# Patient Record
Sex: FEMALE | Race: WHITE | NOT HISPANIC OR LATINO | Employment: OTHER | URBAN - METROPOLITAN AREA
[De-identification: names, ages, dates, MRNs, and addresses within clinical notes are randomized per-mention and may not be internally consistent; named-entity substitution may affect disease eponyms.]

---

## 2017-01-04 ENCOUNTER — DOCUMENTATION ONLY (OUTPATIENT)
Dept: OTHER | Facility: CLINIC | Age: 82
End: 2017-01-04

## 2017-05-17 DIAGNOSIS — Z12.31 VISIT FOR SCREENING MAMMOGRAM: Primary | ICD-10-CM

## 2017-06-30 DIAGNOSIS — M1A.0710 IDIOPATHIC CHRONIC GOUT OF RIGHT ANKLE WITHOUT TOPHUS: ICD-10-CM

## 2017-06-30 RX ORDER — INDOMETHACIN 25 MG/1
25 CAPSULE ORAL
Qty: 60 CAPSULE | Refills: 1 | Status: SHIPPED | OUTPATIENT
Start: 2017-06-30 | End: 2017-11-15

## 2017-06-30 NOTE — TELEPHONE ENCOUNTER
Reason for call:  Medication    1. Medication Name?  Indomethazin  2. Is this request for a refill? Yes  3. What Pharmacy do you use?  [facility]  4. Have you contacted your pharmacy?  Yes  5. If yes, when?  Today  (Please note that the turn-around-time for prescriptions is 72 business hours; I am sending your request at this time. SEND TO  [facility]  )  Description:    The pharmacy told her her prescription was out of date. Please call Norma to advise  Was an appointment offered for this a call?   No  Preferred method for responding to this message  [phone]  If we cannot reach you directly, may we leave a detailed response at the number you provided?  Yes

## 2017-06-30 NOTE — TELEPHONE ENCOUNTER
Indocin  Last visit: 10.31.16  Last refill: 11.16.15 #60 /1 - has not filled since 2015. Please advise. Thank you  PCP Loree Foreman

## 2017-07-18 DIAGNOSIS — Z12.31 VISIT FOR SCREENING MAMMOGRAM: Primary | ICD-10-CM

## 2017-07-18 PROCEDURE — 77063 BREAST TOMOSYNTHESIS BI: CPT | Mod: TC

## 2017-07-18 PROCEDURE — G0202 SCR MAMMO BI INCL CAD: HCPCS | Mod: TC

## 2017-11-15 ENCOUNTER — APPOINTMENT (OUTPATIENT)
Dept: CT IMAGING | Facility: HOSPITAL | Age: 82
End: 2017-11-15
Attending: PHYSICIAN ASSISTANT
Payer: MEDICARE

## 2017-11-15 ENCOUNTER — APPOINTMENT (OUTPATIENT)
Dept: GENERAL RADIOLOGY | Facility: HOSPITAL | Age: 82
End: 2017-11-15
Attending: PHYSICIAN ASSISTANT
Payer: MEDICARE

## 2017-11-15 ENCOUNTER — HOSPITAL ENCOUNTER (EMERGENCY)
Facility: HOSPITAL | Age: 82
Discharge: HOME | End: 2017-11-15
Attending: PHYSICIAN ASSISTANT | Admitting: PHYSICIAN ASSISTANT
Payer: MEDICARE

## 2017-11-15 VITALS
DIASTOLIC BLOOD PRESSURE: 81 MMHG | HEIGHT: 63 IN | OXYGEN SATURATION: 98 % | BODY MASS INDEX: 32.78 KG/M2 | SYSTOLIC BLOOD PRESSURE: 186 MMHG | RESPIRATION RATE: 16 BRPM | TEMPERATURE: 98 F | WEIGHT: 185 LBS

## 2017-11-15 DIAGNOSIS — R07.9 CHEST PAIN OF UNKNOWN ETIOLOGY: ICD-10-CM

## 2017-11-15 LAB
ALBUMIN SERPL-MCNC: 3.7 G/DL (ref 3.4–5)
ALP SERPL-CCNC: 101 U/L (ref 40–150)
ALT SERPL W P-5'-P-CCNC: 17 U/L (ref 0–50)
ANION GAP SERPL CALCULATED.3IONS-SCNC: 8 MMOL/L (ref 3–14)
AST SERPL W P-5'-P-CCNC: 18 U/L (ref 0–45)
BASOPHILS # BLD AUTO: 0 10E9/L (ref 0–0.2)
BASOPHILS NFR BLD AUTO: 0.4 %
BILIRUB SERPL-MCNC: 0.4 MG/DL (ref 0.2–1.3)
BUN SERPL-MCNC: 20 MG/DL (ref 7–30)
CALCIUM SERPL-MCNC: 9.3 MG/DL (ref 8.5–10.1)
CHLORIDE SERPL-SCNC: 104 MMOL/L (ref 94–109)
CO2 SERPL-SCNC: 27 MMOL/L (ref 20–32)
CREAT SERPL-MCNC: 0.86 MG/DL (ref 0.52–1.04)
DIFFERENTIAL METHOD BLD: NORMAL
EOSINOPHIL # BLD AUTO: 0.2 10E9/L (ref 0–0.7)
EOSINOPHIL NFR BLD AUTO: 3 %
ERYTHROCYTE [DISTWIDTH] IN BLOOD BY AUTOMATED COUNT: 12.5 % (ref 10–15)
GFR SERPL CREATININE-BSD FRML MDRD: 62 ML/MIN/1.7M2
GLUCOSE SERPL-MCNC: 132 MG/DL (ref 70–99)
HCT VFR BLD AUTO: 38.6 % (ref 35–47)
HGB BLD-MCNC: 13.2 G/DL (ref 11.7–15.7)
IMM GRANULOCYTES # BLD: 0 10E9/L (ref 0–0.4)
IMM GRANULOCYTES NFR BLD: 0.3 %
LYMPHOCYTES # BLD AUTO: 2.1 10E9/L (ref 0.8–5.3)
LYMPHOCYTES NFR BLD AUTO: 29.6 %
MCH RBC QN AUTO: 31.1 PG (ref 26.5–33)
MCHC RBC AUTO-ENTMCNC: 34.2 G/DL (ref 31.5–36.5)
MCV RBC AUTO: 91 FL (ref 78–100)
MONOCYTES # BLD AUTO: 0.7 10E9/L (ref 0–1.3)
MONOCYTES NFR BLD AUTO: 9.6 %
NEUTROPHILS # BLD AUTO: 4.1 10E9/L (ref 1.6–8.3)
NEUTROPHILS NFR BLD AUTO: 57.1 %
NRBC # BLD AUTO: 0 10*3/UL
NRBC BLD AUTO-RTO: 0 /100
PLATELET # BLD AUTO: 190 10E9/L (ref 150–450)
POTASSIUM SERPL-SCNC: 4 MMOL/L (ref 3.4–5.3)
PROT SERPL-MCNC: 7.9 G/DL (ref 6.8–8.8)
RBC # BLD AUTO: 4.24 10E12/L (ref 3.8–5.2)
SODIUM SERPL-SCNC: 139 MMOL/L (ref 133–144)
TROPONIN I SERPL-MCNC: <0.015 UG/L (ref 0–0.04)
WBC # BLD AUTO: 7.1 10E9/L (ref 4–11)

## 2017-11-15 PROCEDURE — 93005 ELECTROCARDIOGRAM TRACING: CPT

## 2017-11-15 PROCEDURE — 71275 CT ANGIOGRAPHY CHEST: CPT | Mod: TC

## 2017-11-15 PROCEDURE — 25000132 ZZH RX MED GY IP 250 OP 250 PS 637: Mod: GY | Performed by: PHYSICIAN ASSISTANT

## 2017-11-15 PROCEDURE — 71020 XR CHEST 2 VW: CPT | Mod: TC

## 2017-11-15 PROCEDURE — 80053 COMPREHEN METABOLIC PANEL: CPT | Performed by: PHYSICIAN ASSISTANT

## 2017-11-15 PROCEDURE — 84484 ASSAY OF TROPONIN QUANT: CPT | Performed by: PHYSICIAN ASSISTANT

## 2017-11-15 PROCEDURE — 93010 ELECTROCARDIOGRAM REPORT: CPT | Performed by: INTERNAL MEDICINE

## 2017-11-15 PROCEDURE — 85025 COMPLETE CBC W/AUTO DIFF WBC: CPT | Performed by: PHYSICIAN ASSISTANT

## 2017-11-15 PROCEDURE — 99284 EMERGENCY DEPT VISIT MOD MDM: CPT | Performed by: PHYSICIAN ASSISTANT

## 2017-11-15 PROCEDURE — 99285 EMERGENCY DEPT VISIT HI MDM: CPT | Mod: 25

## 2017-11-15 PROCEDURE — A9270 NON-COVERED ITEM OR SERVICE: HCPCS | Mod: GY | Performed by: PHYSICIAN ASSISTANT

## 2017-11-15 PROCEDURE — 25000128 H RX IP 250 OP 636: Performed by: RADIOLOGY

## 2017-11-15 PROCEDURE — 36415 COLL VENOUS BLD VENIPUNCTURE: CPT | Performed by: PHYSICIAN ASSISTANT

## 2017-11-15 RX ORDER — ASPIRIN 81 MG/1
162 TABLET, CHEWABLE ORAL ONCE
Status: COMPLETED | OUTPATIENT
Start: 2017-11-15 | End: 2017-11-15

## 2017-11-15 RX ORDER — IOPAMIDOL 755 MG/ML
75 INJECTION, SOLUTION INTRAVASCULAR ONCE
Status: COMPLETED | OUTPATIENT
Start: 2017-11-15 | End: 2017-11-15

## 2017-11-15 RX ADMIN — ASPIRIN 81 MG 162 MG: 81 TABLET ORAL at 12:29

## 2017-11-15 RX ADMIN — IOPAMIDOL 75 ML: 755 INJECTION, SOLUTION INTRAVENOUS at 11:28

## 2017-11-15 ASSESSMENT — ENCOUNTER SYMPTOMS
NAUSEA: 0
ABDOMINAL PAIN: 0
PALPITATIONS: 0
DIZZINESS: 0
WEAKNESS: 0
FEVER: 0
VOMITING: 0
CHILLS: 0
SHORTNESS OF BREATH: 0
APPETITE CHANGE: 0
BRUISES/BLEEDS EASILY: 0
CHEST TIGHTNESS: 0
ACTIVITY CHANGE: 0
BACK PAIN: 1
HEADACHES: 0
NECK PAIN: 0
FATIGUE: 0
LIGHT-HEADEDNESS: 0

## 2017-11-15 NOTE — ED AVS SNAPSHOT
[facility]    [address] [address]    Phone:  [phone]                                       Norma Banerjee   MRN: [id number]    Department:  [facility]   Date of Visit:  11/15/2017           After Visit Summary Signature Page     I have received my discharge instructions, and my questions have been answered. I have discussed any challenges I see with this plan with the nurse or doctor.    ..........................................................................................................................................  Patient/Patient Representative Signature      ..........................................................................................................................................  Patient Representative Print Name and Relationship to Patient    ..................................................               ................................................  Date                                            Time    ..........................................................................................................................................  Reviewed by Signature/Title    ...................................................              ..............................................  Date                                                            Time

## 2017-11-15 NOTE — ED PROVIDER NOTES
"  History     Chief Complaint   Patient presents with     Chest Pain     last night at 0300 lasting 10-15 minutes     The history is provided by the patient.     Norma Banerjee is a 87 year old female who presented to the ED for evaluation of chest pain.  Her pain began last night at approx 0300.  Described as anterior and generalized.  \"Agravating.\"  Lasted approx 15 minutes.  No associated dyspnea. No nausea.  No diaphoresis.  No abdominal pain.  No cough. Pain free since approx 0315.  During her ROS, she does admit to changes in back pain.  She has chronic mild low back pain that over the last week has worsened and changed location to the mid and upper back with radiation to her arms.  No pain currently.     Problem List:    Patient Active Problem List    Diagnosis Date Noted     ACP (advance care planning) 10/31/2016     Priority: Medium     Advance Care Planning 11/14/2016: Receipt of ACP document:  Received: POLST which was signed and dated by provider on 11/3/16.  Document previously scanned on 11/8/16.  Order reviewed and found to be valid.  Code Status reflects choices in most recent ACP document.  Confirmed/documented designated decision maker(s).  Added by Melba Luu  Advance Care Planning 10/31/2016: ACP Review of Chart / Resources Provided:  Reviewed chart for advance care plan.  Norma Banerjee has no plan or code status on file. Discussed available resources and provided with information. Confirmed code status reflects current choices pending further ACP discussions.  Confirmed/documented legally designated decision makers.  Added by Mariana Richards               Incomplete tear of right rotator cuff 10/31/2016     Priority: Medium     Osteoarthritis 10/01/2012     Priority: Medium     Problem list name updated by automated process. Provider to review       Urinary incontinence 11/16/2006     Priority: Medium     Problem list name updated by automated process. Provider to review      "     Past Medical History:    Past Medical History:   Diagnosis Date     Carpal tunnel syndrome 02/14/2011     Chest pain, atypical 10/26/2007     Dermoid cyst of ovary 03/05/2008     Gout 09/26/2011     Osteoarthritis 10/01/2012     Recurrent UTI 09/26/2011     Urinary incontinence 11/16/2006     Weakness of both legs 09/26/2011       Past Surgical History:    Past Surgical History:   Procedure Laterality Date     ARTHROSCOPY KNEE      Osteoarthritis, left     C THALLIUM STRESS TEST  2007    negative persantine thallium GXT, Chest pain     CATARACT IOL, RT/LT      bilateral cataract surgery     CHOLECYSTECTOMY      Cholecystitis     COLONOSCOPY  4/2008    free of disease     HC ECP WITH CATARACT SURGERY       JOINT REPLACEMENT      Osteoarthritis, bilateral     RELEASE CARPAL TUNNEL  2011    RT     SURGICAL PATHOLOGY EXAM      ovarian mass, laparotomy       Family History:    Family History   Problem Relation Age of Onset     Alzheimer Disease Mother 92     cause of death     C.A.D. Father      CANCER Mother      uterine     CEREBROVASCULAR DISEASE Father 72     cva, cause of death     DIABETES Sister      Hypertension Sister      Hypertension Sister      Hypertension Sister        Social History:  Marital Status:  Single [1]  Social History   Substance Use Topics     Smoking status: Former Smoker     Types: Cigarettes     Smokeless tobacco: Never Used      Comment: tried to quit yes, passive exposure no     Alcohol use No        Medications:      aspirin 81 MG tablet   Acetaminophen (TYLENOL PO)   Elastic Bandages & Supports (ACE ARM SLING) MISC         Review of Systems   Constitutional: Negative for activity change, appetite change, chills, fatigue and fever.   Respiratory: Negative for chest tightness and shortness of breath.    Cardiovascular: Positive for chest pain. Negative for palpitations.        Resolved early this morning    Gastrointestinal: Negative for abdominal pain, nausea and vomiting.  "  Genitourinary: Negative.    Musculoskeletal: Positive for back pain. Negative for neck pain.   Skin: Negative.    Neurological: Negative for dizziness, weakness, light-headedness and headaches.   Hematological: Does not bruise/bleed easily.       Physical Exam   BP: 179/75  Heart Rate: 86  Temp: 97.8  F (36.6  C)  Resp: 16  Height: 160 cm (5' 3\")  Weight: 83.9 kg (185 lb)  SpO2: 97 %      Physical Exam   Constitutional: She is oriented to person, place, and time. She appears well-developed and well-nourished. No distress.   Pleasant and talkative    HENT:   Head: Normocephalic and atraumatic.   Eyes: Conjunctivae and EOM are normal.   Neck: Normal range of motion. Neck supple.   Cardiovascular: Normal rate and regular rhythm.    Pulmonary/Chest: Effort normal and breath sounds normal.   Abdominal: Soft. There is no tenderness. There is no guarding.   Musculoskeletal: She exhibits no edema.   Neurological: She is alert and oriented to person, place, and time.   Skin: Skin is warm and dry.   Psychiatric: She has a normal mood and affect.   Nursing note and vitals reviewed.      ED Course     ED Course     Procedures          EKG shows a sinus rhythm with PAC.  No evidence of ST or T wave concerns.      CXR shows no focal infiltrate, PTX, or widened mediastinum.     Medications   iopamidol (ISOVUE-370) solution 75 mL (75 mLs Intravenous Given 11/15/17 1128)   sodium chloride (PF) 0.9% PF flush 100 mL (100 mLs Intravenous Given 11/15/17 1128)   aspirin chewable tablet 162 mg (162 mg Oral Given 11/15/17 1229)     Results for orders placed or performed during the hospital encounter of 11/15/17   XR Chest 2 Views    Narrative    XR CHEST 2 VW    HISTORY: 87 years Female chest pain;     COMPARISON: None    TECHNIQUE: 2 views of the chest were obtained.    FINDINGS: Two views of the chest were obtained. Heart size and  pulmonary vascularity are within normal limits, lungs are clear both  views. No consolidating air space " opacities are present.    There are 2 or 3 small dense nodular opacities in the mid right lung  seen only on the PA projection.      Impression    IMPRESSION: Small dense nodular opacities mid right lung, suspect  calcified granulomas. Lungs otherwise clear.    JAGRUTI BANKS MD   CT Chest Angio w and w/o contrast    Narrative    CTA ANGIOGRAM CHEST CONTRAST  11/15/2017 11:36 AM    CLINICAL HISTORY: Female, age 87 years,  Dissection; ;    Comparison:  None    TECHNIQUE:  CT was performed of the chest  with IV contrast.   Sagittal, coronal and axial reconstructions were reviewed.     FINDINGS:  Chest CT:   Lungs : Minimal dependent atelectasis.  Thyroid: Asymmetric enlargement of the right lobe.  Heart and Great Vessels:  Good quality examination demonstrates no  evidence of aortic dissection. There is small volume of calcified and  noncalcified plaque seen throughout the thoracic aorta.    Lymph Nodes:  Normal.  Pleura: Calcified and noncalcified pleural plaques posteriorly within  the right hemithorax.  Bony Structures:  Mild degenerative changes of the thoracic spine.  Esophagus/duodenum: Mild circumferential wall thickening in its distal  aspect.    Visualized portions of the abdomen:  Liver:  The visualized portions are normal.  Gallbladder: Surgically absent.  Spleen: The visualized portions are normal.  Pancreas: The visualized portions are normal.  Adrenal Glands: Normal  Kidneys: 3 cm cortical cyst at the upper pole the left kidney.  Cortical scarring in the right kidney and small cortical cysts.  Ureters: Not included.  Abdominal Aorta: The visualized portions are normal.    Other Findings:  No lymphadenopathy or ascites in the upper abdomen.      Impression    IMPRESSION:  1.   Good quality examination demonstrates no evidence of dissection.    2.  Mild circumferential wall thickening of the distal esophagus,  possibly related to an esophagitis.    3.  Cortical scarring the right kidney and bilateral  cortical cysts of  the kidneys.    4.  Calcified pleural plaques of the right hemithorax.    MATT KAUFMAN MD   CBC with platelets differential   Result Value Ref [facility]    WBC 7.1 4.0 - 11.0 10e9/L    RBC Count 4.24 3.8 - 5.2 10e12/L    Hemoglobin 13.2 11.7 - 15.7 g/dL    Hematocrit 38.6 35.0 - 47.0 %    MCV 91 78 - 100 fl    MCH 31.1 26.5 - 33.0 pg    MCHC 34.2 31.5 - 36.5 g/dL    RDW 12.5 10.0 - 15.0 %    Platelet Count 190 150 - 450 10e9/L    Diff Method Automated Method     % Neutrophils 57.1 %    % Lymphocytes 29.6 %    % Monocytes 9.6 %    % Eosinophils 3.0 %    % Basophils 0.4 %    % Immature Granulocytes 0.3 %    Nucleated RBCs 0 0 /100    Absolute Neutrophil 4.1 1.6 - 8.3 10e9/L    Absolute Lymphocytes 2.1 0.8 - 5.3 10e9/L    Absolute Monocytes 0.7 0.0 - 1.3 10e9/L    Absolute Eosinophils 0.2 0.0 - 0.7 10e9/L    Absolute Basophils 0.0 0.0 - 0.2 10e9/L    Abs Immature Granulocytes 0.0 0 - 0.4 10e9/L    Absolute Nucleated RBC 0.0    Comprehensive metabolic panel   Result Value Ref [facility]    Sodium 139 133 - 144 mmol/L    Potassium 4.0 3.4 - 5.3 mmol/L    Chloride 104 94 - 109 mmol/L    Carbon Dioxide 27 20 - 32 mmol/L    Anion Gap 8 3 - 14 mmol/L    Glucose 132 (H) 70 - 99 mg/dL    Urea Nitrogen 20 7 - 30 mg/dL    Creatinine 0.86 0.52 - 1.04 mg/dL    GFR Estimate 62 >60 mL/min/1.7m2    GFR Estimate If Black 75 >60 mL/min/1.7m2    Calcium 9.3 8.5 - 10.1 mg/dL    Bilirubin Total 0.4 0.2 - 1.3 mg/dL    Albumin 3.7 3.4 - 5.0 g/dL    Protein Total 7.9 6.8 - 8.8 g/dL    Alkaline Phosphatase 101 40 - 150 U/L    ALT 17 0 - 50 U/L    AST 18 0 - 45 U/L   Troponin I   Result Value Ref [facility]    Troponin I ES <0.015 0.000 - 0.045 ug/L       Critical Care time:  none               Labs Ordered and Resulted from Time of ED Arrival Up to the Time of Departure from the ED   COMPREHENSIVE METABOLIC PANEL - Abnormal; Notable for the following:        Result Value    Glucose 132 (*)     All other components within normal  "limits   CBC WITH PLATELETS DIFFERENTIAL   TROPONIN I   PERIPHERAL IV CATHETER       Assessments & Plan (with Medical Decision Making)   Pain had entirely resolved 6 hours prior to presenting to the ED.  No associated symptoms of ACS.  I did have a concern about a possible aortic issue with her new onset upper back pain and arm radiculopathy, as well as the new chest pain.  This was negative.  Treated with ASA and advised starting a baby aspirin daily.  Needs close follow-up with PCP.  I stressed the utmost importance of returning HERE for ANY return of pain, pain that changes in quality, location, or character, dyspnea, diaphoresis, or ANY other questions or concerns.  Ms. Banerjee tells me \"I haven't felt this good in weeks.\"  She is quite happy and agreeable.  Discharged in stable and good condition, ambulatory without assistance, completely asymptomatic.       I have reviewed the nursing notes.    I have reviewed the findings, diagnosis, plan and need for follow up with the patient.       Discharge Medication List as of 11/15/2017 12:18 PM      START taking these medications    Details   aspirin 81 MG tablet Take 1 tablet (81 mg) by mouth daily, R-OTC, OTC             Final diagnoses:   Chest pain of unknown etiology       11/15/2017   [facility]     Darvin Cortez PA-C  11/15/17 1326    "

## 2017-11-15 NOTE — DISCHARGE INSTRUCTIONS
*CHEST PAIN, UNCERTAIN CAUSE    Based on your exam today, the exact cause of your chest pain is not certain. Your condition does not seem serious at this time, and your pain does not appear to be coming from your heart. However, sometimes the signs of a serious problem take more time to appear. Therefore, watch for the warning signs listed below.  HOME CARE:  1. Rest today and avoid strenuous activity.  2. Take any prescribed medicine as directed.  FOLLOW UP with your doctor in 1-3 days.   GET PROMPT MEDICAL ATTENTION if any of the following occur:    A change in the type of pain: if it feels different, becomes more severe, lasts longer, or begins to spread into your shoulder, arm, neck, jaw or back    Shortness of breath or increased pain with breathing    Weakness, dizziness, or fainting    Cough with blood or dark colored sputum (phlegm)    Fever over 101  F (38.3  C)    Swelling, pain or redness in one leg    9888-9637 [employer]. [address] [place] [address]. All rights reserved. This information is not intended as a substitute for professional medical care. Always follow your healthcare professional's instructions.  This information has been modified by your health care provider with permission from the publisher.  What to expect when you have contrast    During your exam, we will inject  contrast  into your vein or artery. (Contrast is a clear liquid with iodine in it. It shows up on X-rays.)    You may feel warm or hot. You may have a metal taste in your mouth and a slight upset stomach. You may also feel pressure near the kidneys and bladder. These effects will last about 1 to 3 minutes.    Please tell us if you have:    Sneezing     Itching    Hives     Swelling in the face    A hoarse voice    Breathing problems    Other new symptoms    Serious problems are rare.  They may include:    Irregular heartbeat     Seizures    Kidney failure              Tissue damage    Shock       Death    If you have any problems during the exam, we  will treat them right away.    When you get home    Call your hospital if you have any new symptoms in the next 2 days, like hives or swelling. (Phone numbers are at the bottom of this page.) Or call your family doctor.     If you have wheezing or trouble breathing, call 911.    Self-care  -Drink at least 4 extra glasses of water today.   This reduces the stress on your kidneys.  -Keep taking your regular medicines.    The contrast will pass out of your body in your  Urine(pee). This will happen in the next 24 hours. You  will not feel this. Your urine will not  change color.    If you have kidney problems or take metformin    Drink 4 to 8 large glasses of water for the next  2 days, if you are not on a fluid restriction.    ?If you take metformin (Glucophage or Glucovance) for diabetes, keep taking it.      ?Your kidney function tests are abnormal.  If you take Metformin, do not take it for 48 hours. Please go to your clinic for a blood test within 3 days after your exam before the restarting this medicine.     (Note to provider:please give patient prescription for lab tests.)    ?Special instructions: -    I have read and understand the above information.    Patient Sign Here:______________________________________Date:________Time:______    Staff Sign Here:________________________________________Date:_______Time:______      Radiology Departments:     ?[facility]: [phone] ?[facility]: [phone]     ?[place]: [phone] ?[facility]:[phone]      ?[facility]: [phone]  ?[facility]: [phone]  ?[facility]:[phone]    ?[facility]:[phone]  ?[facility]:[phone]    CT scan was negative for aortic concerns.      I do not know what caused your chest pain.  Please start a daily aspirin and follow-up with Dr. Foreman.      Please return HERE for ANY return of chest pain, any shortness of breath, vomiting, sweating, or ANY other  concerns.

## 2017-11-15 NOTE — ED NOTES
DC instructions reviewed with patient.  Patient verbalized understanding and has no further questions.  Home to rest.  Will follow up with Dr. TESS Foreman or will return to the emergency room if chest pain returns or any other concerns.

## 2017-11-15 NOTE — ED AVS SNAPSHOT
[facility]    [address] [address]    Phone:  [phone]                                       Norma Banerjee   MRN: [id number]    Department:  [facility]   Date of Visit:  11/15/2017           Patient Information     Date Of Birth          1/1/1930        Your diagnoses for this visit were:     Chest pain of unknown etiology        You were seen by Darvin Cortez PA-C.      Follow-up Information     Schedule an appointment as soon as possible for a visit with Carol Foreman MD.    Specialty:  Family Practice    Contact information:    [facility] [place]  [address]  [place] [address] [phone]          Follow up with [facility].    Specialty:  EMERGENCY MEDICINE    Why:  If symptoms worsen    Contact information:    [address] [address] [phone]    Additional information:    From [facility]: Take [address]. Turn left at [address]. Turn left at the first stoplight on [address]. At the first stop sign, take a right onto [address]. Take a left into the parking lot and continue through until you reach the North enterance of the building.       From [place]: Take [address]. Take the [address] ramp towards [place]. Turn left onto [address]. Take a slight right onto [address]. Turn left at the first stoplight on [address]. At the first stop sign, take a right onto [address]. Take a left into the parking lot and continue through until you reach the North enterance of the building.       From [place]: Take [address]. Take a right at [address]. At the first stop sign, take a right onto [address]. Take a left into the parking lot and continue through until you reach the North enterance of the building.         Discharge Instructions            *CHEST PAIN, UNCERTAIN CAUSE    Based on your exam today, the exact  cause of your chest pain is not certain. Your condition does not seem serious at this time, and your pain does not appear to be coming from your heart. However, sometimes the signs of a serious problem take more time to appear. Therefore, watch for the warning signs listed below.  HOME CARE:  1. Rest today and avoid strenuous activity.  2. Take any prescribed medicine as directed.  FOLLOW UP with your doctor in 1-3 days.   GET PROMPT MEDICAL ATTENTION if any of the following occur:    A change in the type of pain: if it feels different, becomes more severe, lasts longer, or begins to spread into your shoulder, arm, neck, jaw or back    Shortness of breath or increased pain with breathing    Weakness, dizziness, or fainting    Cough with blood or dark colored sputum (phlegm)    Fever over 101  F (38.3  C)    Swelling, pain or redness in one leg    3104-0204 [employer]. [address] [place] [address]. All rights reserved. This information is not intended as a substitute for professional medical care. Always follow your healthcare professional's instructions.  This information has been modified by your health care provider with permission from the publisher.  What to expect when you have contrast    During your exam, we will inject  contrast  into your vein or artery. (Contrast is a clear liquid with iodine in it. It shows up on X-rays.)    You may feel warm or hot. You may have a metal taste in your mouth and a slight upset stomach. You may also feel pressure near the kidneys and bladder. These effects will last about 1 to 3 minutes.    Please tell us if you have:    Sneezing     Itching    Hives     Swelling in the face    A hoarse voice    Breathing problems    Other new symptoms    Serious problems are rare.  They may include:    Irregular heartbeat     Seizures    Kidney failure              Tissue damage    Shock      Death    If you have any problems during the exam, we  will treat them  right away.    When you get home    Call your hospital if you have any new symptoms in the next 2 days, like hives or swelling. (Phone numbers are at the bottom of this page.) Or call your family doctor.     If you have wheezing or trouble breathing, call 911.    Self-care  -Drink at least 4 extra glasses of water today.   This reduces the stress on your kidneys.  -Keep taking your regular medicines.    The contrast will pass out of your body in your  Urine(pee). This will happen in the next 24 hours. You  will not feel this. Your urine will not  change color.    If you have kidney problems or take metformin    Drink 4 to 8 large glasses of water for the next  2 days, if you are not on a fluid restriction.    ?If you take metformin (Glucophage or Glucovance) for diabetes, keep taking it.      ?Your kidney function tests are abnormal.  If you take Metformin, do not take it for 48 hours. Please go to your clinic for a blood test within 3 days after your exam before the restarting this medicine.     (Note to provider:please give patient prescription for lab tests.)    ?Special instructions: -    I have read and understand the above information.    Patient Sign Here:______________________________________Date:________Time:______    Staff Sign Here:________________________________________Date:_______Time:______      Radiology Departments:     ?[facility]: [phone] ?[place]: [phone]     ?[place]: [phone] ?[facility]:[phone]      ?[place]: [phone]  ?[facility]: [phone]  ?[facility]:[phone]    ?[facility]:[phone]  ?[facility]:[phone]    CT scan was negative for aortic concerns.      I do not know what caused your chest pain.  Please start a daily aspirin and follow-up with Dr. Foreman.      Please return HERE for ANY return of chest pain, any shortness of breath, vomiting, sweating, or ANY other concerns.     Future Appointments        Provider Department Dept Phone Center     11/30/2017 9:00 AM Carol Foreman MD [facility] [phone] [facility]         Review of your medicines      START taking        Dose / Directions Last dose taken    aspirin 81 MG tablet   Dose:  81 mg        Take 1 tablet (81 mg) by mouth daily   Refills:  OTC          Our records show that you are taking the medicines listed below. If these are incorrect, please call your family doctor or clinic.        Dose / Directions Last dose taken    ACE ARM SLING Misc   Dose:  1 Units   Quantity:  1 each        1 Units continuous   Refills:  0        TYLENOL PO        Take by mouth At Bedtime   Refills:  0                Prescriptions were sent or printed at these locations (1 Prescription)                   [id number] - [place], [place] - [address]   [address] [[email or web address]][place] [address]    Telephone:  [phone]   Fax:  [phone]   Hours:                  Not Printed or Sent (1 of 1)         aspirin 81 MG tablet                Procedures and tests performed during your visit     CBC with platelets differential    CT Chest Angio w and w/o contrast    Comprehensive metabolic panel    EKG 12 lead    EKG 12-lead, tracing only    Peripheral IV: Standard    Troponin I    XR Chest 2 Views      Orders Needing Specimen Collection     None      Pending Results     No orders found from 11/13/2017 to 11/16/2017.            Pending Culture Results     No orders found from 11/13/2017 to 11/16/2017.            Thank you for choosing [facility]       Thank you for choosing [facility] for your care. Our goal is always to provide you with excellent care. Hearing back from our patients is one way we can continue to improve our services. Please take a few minutes to complete the written survey that you may receive in the mail after you visit with us. Thank you!        [employer] Information     [employer] gives you secure access to your electronic health record. If you see a primary care provider, you can also send  messages to your care team and make appointments. If you have questions, please call your primary care clinic.  If you do not have a primary care provider, please call [phone] and they will assist you.        Care EveryWhere ID     This is your Care EveryWhere ID. This could be used by other organizations to access your [facility] medical records  [id number]        Equal Access to Services     YOUNG JAMIL : Jake Saleh, mars thomas, jacque bowman, nii veronica . So [facility] [phone].    ATENCIÓN: Si habla español, tiene a oliva disposición servicios gratuitos de asistencia lingüística. Fransisco al [phone].    We comply with applicable federal civil rights laws and [place] laws. We do not discriminate on the basis of race, color, national origin, age, disability, sex, sexual orientation, or gender identity.            After Visit Summary       This is your record. Keep this with you and show to your community pharmacist(s) and doctor(s) at your next visit.

## 2017-11-15 NOTE — ED NOTES
"Ambulated into ED room 4 independently with c/o chest pain rated 5/10 during the night. Denies any cardiac history. Denies any pain currently. Denies SOB. Did state pain radiated into left arm and back at the time it occurred. Patient cooperative but upset with full work up and states, \"I'm just fine, I've never had a health issue, I need to get home to my soup.\" Cardiac monitor placed which shows a SR in the 70's with frequent PVC's. Call light within reach.   "

## 2017-11-30 ENCOUNTER — RADIANT APPOINTMENT (OUTPATIENT)
Dept: GENERAL RADIOLOGY | Facility: OTHER | Age: 82
End: 2017-11-30
Attending: FAMILY MEDICINE
Payer: MEDICARE

## 2017-11-30 ENCOUNTER — OFFICE VISIT (OUTPATIENT)
Dept: FAMILY MEDICINE | Facility: OTHER | Age: 82
End: 2017-11-30
Attending: FAMILY MEDICINE
Payer: COMMERCIAL

## 2017-11-30 VITALS
OXYGEN SATURATION: 97 % | BODY MASS INDEX: 33.13 KG/M2 | DIASTOLIC BLOOD PRESSURE: 80 MMHG | HEIGHT: 63 IN | RESPIRATION RATE: 20 BRPM | HEART RATE: 57 BPM | SYSTOLIC BLOOD PRESSURE: 124 MMHG | WEIGHT: 187 LBS

## 2017-11-30 DIAGNOSIS — M54.16 LUMBAR RADICULOPATHY: ICD-10-CM

## 2017-11-30 DIAGNOSIS — M25.552 HIP PAIN, LEFT: ICD-10-CM

## 2017-11-30 DIAGNOSIS — R23.0 TOE CYANOSIS: ICD-10-CM

## 2017-11-30 DIAGNOSIS — Z00.00 ROUTINE GENERAL MEDICAL EXAMINATION AT A HEALTH CARE FACILITY: Primary | ICD-10-CM

## 2017-11-30 DIAGNOSIS — Z23 NEED FOR PROPHYLACTIC VACCINATION AND INOCULATION AGAINST INFLUENZA: ICD-10-CM

## 2017-11-30 DIAGNOSIS — R07.89 OTHER CHEST PAIN: ICD-10-CM

## 2017-11-30 PROCEDURE — 99397 PER PM REEVAL EST PAT 65+ YR: CPT | Performed by: FAMILY MEDICINE

## 2017-11-30 PROCEDURE — 90662 IIV NO PRSV INCREASED AG IM: CPT | Performed by: FAMILY MEDICINE

## 2017-11-30 PROCEDURE — 73502 X-RAY EXAM HIP UNI 2-3 VIEWS: CPT | Mod: TC

## 2017-11-30 PROCEDURE — 72100 X-RAY EXAM L-S SPINE 2/3 VWS: CPT | Mod: TC

## 2017-11-30 PROCEDURE — G0008 ADMIN INFLUENZA VIRUS VAC: HCPCS | Performed by: FAMILY MEDICINE

## 2017-11-30 PROCEDURE — 99212 OFFICE O/P EST SF 10 MIN: CPT | Mod: 25 | Performed by: FAMILY MEDICINE

## 2017-11-30 PROCEDURE — 99212 OFFICE O/P EST SF 10 MIN: CPT | Mod: 25

## 2017-11-30 ASSESSMENT — PAIN SCALES - GENERAL: PAINLEVEL: NO PAIN (0)

## 2017-11-30 NOTE — MR AVS SNAPSHOT
After Visit Summary   11/30/2017    Norma Banerjee    MRN: [id number]           Patient Information     Date Of Birth          1/1/1930        Visit Information        Provider Department      11/30/2017 9:00 AM Carol Foreman MD [facility] [place]        Today's Diagnoses     Routine general medical examination at a health care facility    -  1    Need for prophylactic vaccination and inoculation against influenza        Hip pain, left        Lumbar radiculopathy          Care Instructions      Preventive Health Recommendations  Female Ages 65 +    Yearly exam:     See your health care provider every year in order to  o Review health changes.   o Discuss preventive care.    o Review your medicines if your doctor has prescribed any.      You no longer need a yearly Pap test unless you've had an abnormal Pap test in the past 10 years. If you have vaginal symptoms, such as bleeding or discharge, be sure to talk with your provider about a Pap test.      Every 1 to 2 years, have a mammogram.  If you are over 69, talk with your health care provider about whether or not you want to continue having screening mammograms.      Every 10 years, have a colonoscopy. Or, have a yearly FIT test (stool test). These exams will check for colon cancer.       Have a cholesterol test every 5 years, or more often if your doctor advises it.       Have a diabetes test (fasting glucose) every three years. If you are at risk for diabetes, you should have this test more often.       At age 65, have a bone density scan (DEXA) to check for osteoporosis (brittle bone disease).    Shots:    Get a flu shot each year.    Get a tetanus shot every 10 years.    Talk to your doctor about your pneumonia vaccines. There are now two you should receive - Pneumovax (PPSV 23) and Prevnar (PCV 13).    Talk to your doctor about the shingles vaccine.    Talk to your doctor about the hepatitis B vaccine.    Nutrition:     Eat at least 5  servings of fruits and vegetables each day.      Eat whole-grain bread, whole-wheat pasta and brown rice instead of white grains and rice.      Talk to your provider about Calcium and Vitamin D.     Lifestyle    Exercise at least 150 minutes a week (30 minutes a day, 5 days a week). This will help you control your weight and prevent disease.      Limit alcohol to one drink per day.      No smoking.       Wear sunscreen to prevent skin cancer.       See your dentist twice a year for an exam and cleaning.      See your eye doctor every 1 to 2 years to screen for conditions such as glaucoma, macular degeneration, cataracts, etc                   Follow-ups after your visit        Follow-up notes from your care team     Return in about 1 year (around 11/30/2018) for Physical Exam.      Future tests that were ordered for you today     Open Future Orders        Priority Expected Expires Ordered    XR LUMBAR SPINE 2/3 VIEWS (Clinic Performed) Routine 11/30/2017 11/30/2018 11/30/2017    XR HIP LT G/E 2 VW (Clinic Performed) Routine 11/30/2017 11/30/2018 11/30/2017            Who to contact     If you have questions or need follow up information about today's clinic visit or your schedule please contact [facility] directly at [phone].  Normal or non-critical lab and imaging results will be communicated to you by [employer]art, letter or phone within 4 business days after the clinic has received the results. If you do not hear from us within 7 days, please contact the clinic through [employer]art or phone. If you have a critical or abnormal lab result, we will notify you by phone as soon as possible.  Submit refill requests through VTM or call your pharmacy and they will forward the refill request to us. Please allow 3 business days for your refill to be completed.          Additional Information About Your Visit        VTM Information     VTM gives you secure access to your electronic health record. If you  "see a primary care provider, you can also send messages to your care team and make appointments. If you have questions, please call your primary care clinic.  If you do not have a primary care provider, please call [phone] and they will assist you.        Care EveryWhere ID     This is your Care EveryWhere ID. This could be used by other organizations to access your [place] medical records  [id number]        Your Vitals Were     Pulse Respirations Height Pulse Oximetry Breastfeeding? BMI (Body Mass Index)    57 20 5' 3\" (1.6 m) 97% No 33.13 kg/m2       Blood Pressure from Last 3 Encounters:   11/30/17 124/80   11/15/17 (!) 186/81   10/31/16 118/82    Weight from Last 3 Encounters:   11/30/17 187 lb (84.8 kg)   11/15/17 185 lb (83.9 kg)   10/31/16 180 lb (81.6 kg)              We Performed the Following     HC FLU VACCINE, INCREASED ANTIGEN, PRESV FREE     Vaccine Administration, Initial [[id number]]        Primary Care Provider Office Phone # Fax #    Carol Foreman [phone]       [facility] [place] [address]  [place] [address]        Equal Access to Services     YOUNG JAMIL AH: Hadii aad ku hadasho Soomaali, waaxda luqadaha, qaybta kaalmada adeegyada, waxay idiin hayaan ramon block la'yudith . So [facility] [phone].    ATENCIÓN: Si habla español, tiene a oliva disposición servicios gratuitos de asistencia lingüística. Llame al [phone].    We comply with applicable federal civil rights laws and [place] laws. We do not discriminate on the basis of race, color, national origin, age, disability, sex, sexual orientation, or gender identity.            Thank you!     Thank you for choosing [facility] [place]  for your care. Our goal is always to provide you with excellent care. Hearing back from our patients is one way we can continue to improve our services. Please take a few minutes to complete the written survey that you may receive in the mail after your visit with us. Thank " you!             Your Updated Medication List - Protect others around you: Learn how to safely use, store and throw away your medicines at www.disposemymeds.org.          This list is accurate as of: 11/30/17  9:37 AM.  Always use your most recent med list.                   Brand Name Dispense Instructions for use Diagnosis    aspirin 81 MG tablet      Take 1 tablet (81 mg) by mouth daily        TYLENOL PO      Take by mouth At Bedtime

## 2017-11-30 NOTE — NURSING NOTE
"Chief Complaint   Patient presents with     Physical     Musculoskeletal Problem     right hip pain radiates down her leg       Initial /80  Pulse 57  Resp 20  Ht 5' 3\" (1.6 m)  Wt 187 lb (84.8 kg)  SpO2 97%  Breastfeeding? No  BMI 33.13 kg/m2 Estimated body mass index is 33.13 kg/(m^2) as calculated from the following:    Height as of this encounter: 5' 3\" (1.6 m).    Weight as of this encounter: 187 lb (84.8 kg).  Medication Reconciliation: complete   Erendira Sol      "

## 2017-11-30 NOTE — PROGRESS NOTES
SUBJECTIVE:   Norma Banerjee is a 87 year old female who presents for Preventive Visit.      Are you in the first 12 months of your Medicare Part B coverage?  No    Healthy Habits:    Do you get at least three servings of calcium containing foods daily (dairy, green leafy vegetables, etc.)? No    Amount of exercise or daily activities, outside of work: not really    Problems taking medications regularly No    Medication side effects: No    Have you had an eye exam in the past two years? yes    Do you see a dentist twice per year? no    Do you have sleep apnea, excessive snoring or daytime drowsiness?no              Chief Complaint   Patient presents with     Physical     mammogram done in July, 2017, negative. She would prefer not doing any further mammograms now as she is almost 88     Musculoskeletal Problem     left hip pain radiates down her leg, sometimes coming from the lumbar spine. This is a sharp pain, starting with walking but gets better as she walks     Flu Shot     Chest Pain     she was seen in the ER 11-17-17 for chest pain that awakened her in the middle of the night radiating to her back and arms that lasted about an hour and resolved on its own. She went into the ER about 6 hours later and had a negative workup including a chest CT negative for AAA. She hasn't had any symptoms since and is able to walk the halls without pain.      Toe discolored     left great toe is discolored at times, no pain         Reviewed and updated as needed this visit by clinical staffTobacco  Allergies  Meds  Med Hx  Surg Hx  Fam Hx  Soc Hx        Reviewed and updated as needed this visit by Provider        Social History   Substance Use Topics     Smoking status: Former Smoker     Types: Cigarettes     Smokeless tobacco: Never Used      Comment: tried to quit yes, passive exposure no     Alcohol use No       The patient does not drink >3 drinks per day nor >7 drinks per week.     Today's PHQ-2 Score:   PHQ-2  ( 1999 Pf[employer]) 11/13/2014 11/11/2013   Q1: Little interest or pleasure in doing things 0 0   Q2: Feeling down, depressed or hopeless 0 0   PHQ-2 Score 0 0         Do you feel safe in your environment - Yes    Do you have a Health Care Directive?: Yes: Advance Directive has been received and scanned.    Current providers sharing in care for this patient include: Patient Care Team:  Carol Foreman MD as PCP - General (Family Practice)      Hearing impairment: No    Ability to successfully perform activities of daily living: Yes, no assistance needed     Fall risk:  Fallen 2 or more times in the past year?: No  Any fall with injury in the past year?: No      Home safety:  none identified      The following health maintenance items are reviewed in Epic and correct as of today:Health Maintenance   Topic Date Due     GALILEO QUESTIONNAIRE 1 YEAR  01/01/1948     INFLUENZA VACCINE (SYSTEM ASSIGNED)  09/01/2017     PHQ-9 Q1YR  10/31/2017     FALL RISK ASSESSMENT  07/18/2018     MAMMO Q1 YR  07/18/2018     ADVANCE DIRECTIVE PLANNING Q5 YRS  10/31/2021     TETANUS IMMUNIZATION (SYSTEM ASSIGNED)  11/11/2023     PNEUMOCOCCAL  Addressed     BP Readings from Last 3 Encounters:   11/30/17 124/80   11/15/17 (!) 186/81   10/31/16 118/82    Wt Readings from Last 3 Encounters:   11/30/17 187 lb (84.8 kg)   11/15/17 185 lb (83.9 kg)   10/31/16 180 lb (81.6 kg)                  Patient Active Problem List   Diagnosis     Urinary incontinence     Osteoarthritis     ACP (advance care planning)     Incomplete tear of right rotator cuff     Past Surgical History:   Procedure Laterality Date     ARTHROSCOPY KNEE      Osteoarthritis, left     C THALLIUM STRESS TEST  2007    negative persantine thallium GXT, Chest pain     CATARACT IOL, RT/LT      bilateral cataract surgery     CHOLECYSTECTOMY      Cholecystitis     COLONOSCOPY  4/2008    free of disease     HC ECP WITH CATARACT SURGERY       JOINT REPLACEMENT      Osteoarthritis, bilateral      RELEASE CARPAL TUNNEL  2011    RT     SURGICAL PATHOLOGY EXAM      ovarian mass, laparotomy       Social History   Substance Use Topics     Smoking status: Former Smoker     Types: Cigarettes     Smokeless tobacco: Never Used      Comment: tried to quit yes, passive exposure no     Alcohol use No     Family History   Problem Relation Age of Onset     Alzheimer Disease Mother 92     cause of death     C.A.D. Father      CANCER Mother      uterine     CEREBROVASCULAR DISEASE Father 72     cva, cause of death     DIABETES Sister      Hypertension Sister      Hypertension Sister      Hypertension Sister          Current Outpatient Prescriptions   Medication Sig Dispense Refill     aspirin 81 MG tablet Take 1 tablet (81 mg) by mouth daily  OTC     Acetaminophen (TYLENOL PO) Take by mouth At Bedtime       Allergies   Allergen Reactions     Alendronate Sodium      Hot tolerance  Fosamax       Nitrofurantoin      Chill  Fever  Chest pain  Macrobid       Nitrofurantoin Macrocrystal      Chills   Fever  Chest pain  Macrobid       Propoxyphene Napsylate      Darvocet-N 100       Sulfamethoxazole Rash     Bactrim DS     Trimethoprim Rash     Bactrim DS             Mammogram Screening: discussed, further mammograms declined    ROS:  C: NEGATIVE for fever, chills, change in weight  I: NEGATIVE for worrisome rashes, moles or lesions  E: NEGATIVE for vision changes or irritation  E/M: NEGATIVE for ear, mouth and throat problems  R: NEGATIVE for significant cough or SOB  B: NEGATIVE for masses, tenderness or discharge  CV: NEGATIVE for chest pain, palpitations or peripheral edema  GI: NEGATIVE for nausea, abdominal pain, heartburn, or change in bowel habits  : NEGATIVE for frequency, dysuria, or hematuria  M: NEGATIVE for significant arthralgias or myalgia  N: NEGATIVE for weakness, dizziness or paresthesias  E: NEGATIVE for temperature intolerance, skin/hair changes  H: NEGATIVE for bleeding problems  P: NEGATIVE for changes in  "mood or affect    OBJECTIVE:   /80  Pulse 57  Resp 20  Ht 5' 3\" (1.6 m)  Wt 187 lb (84.8 kg)  SpO2 97%  Breastfeeding? No  BMI 33.13 kg/m2 Estimated body mass index is 33.13 kg/(m^2) as calculated from the following:    Height as of this encounter: 5' 3\" (1.6 m).    Weight as of this encounter: 187 lb (84.8 kg).  EXAM:   GENERAL APPEARANCE: healthy, alert and no distress  EYES: Eyes grossly normal to inspection, PERRL and conjunctivae and sclerae normal  HENT: ear canals and TM's normal, nose and mouth without ulcers or lesions, oropharynx clear and oral mucous membranes moist  NECK: no adenopathy, no asymmetry, masses, or scars and thyroid normal to palpation  RESP: lungs clear to auscultation - no rales, rhonchi or wheezes  BREAST: normal without masses, tenderness or nipple discharge and no palpable axillary masses or adenopathy  CV: regular rate and rhythm, normal S1 S2, no S3 or S4, no murmur, click or rub, no peripheral edema and peripheral pulses strong  ABDOMEN: soft, nontender, no hepatosplenomegaly, no masses and bowel sounds normal  MS: no musculoskeletal defects are noted and gait is age appropriate without ataxia  SKIN: no suspicious lesions or rashes  NEURO: Normal strength and tone, sensory exam grossly normal, mentation intact and speech normal  PSYCH: mentation appears normal and affect normal/bright    ASSESSMENT / PLAN:   1. Routine general medical examination at a health care facility  Mammogram done last July. Labs done 2 weeks ago in the ER. Lipid profile done 1 year ago is excellent, not repeated today, she isn't fasting    2. Need for prophylactic vaccination and inoculation against influenza    - HC FLU VACCINE, INCREASED ANTIGEN, PRESV FREE  - Vaccine Administration, Initial [23950]    3. Hip pain, left  Will check x rays, no pain with ROM today  - XR HIP LT G/E 2 VW (Clinic Performed); Future    4. Lumbar radiculopathy  left  - XR LUMBAR SPINE 2/3 VIEWS (Clinic Performed); " "Future    5. Other chest pain  Will evaluate further  - NM Lexiscan stress test; Future    6. Toe cyanosis  Normal DP pulses of the left foot, foot is cool,discussed layering to keep extremities warm      End of Life Planning:  Patient currently has an advanced directive: Yes.  Practitioner is supportive of decision.    COUNSELING:  Reviewed preventive health counseling, as reflected in patient instructions       Regular exercise       Healthy diet/nutrition        Estimated body mass index is 33.13 kg/(m^2) as calculated from the following:    Height as of this encounter: 5' 3\" (1.6 m).    Weight as of this encounter: 187 lb (84.8 kg).  Weight management plan: Discussed healthy diet and exercise guidelines and patient will follow up in 12 months in clinic to re-evaluate.   reports that she has quit smoking. Her smoking use included Cigarettes. She has never used smokeless tobacco.        Appropriate preventive services were discussed with this patient, including applicable screening as appropriate for cardiovascular disease, diabetes, osteopenia/osteoporosis, and glaucoma.  As appropriate for age/gender, discussed screening for colorectal cancer, prostate cancer, breast cancer, and cervical cancer. Checklist reviewing preventive services available has been given to the patient.    Reviewed patients plan of care and provided an AVS. The Intermediate Care Plan ( asthma action plan, low back pain action plan, and migraine action plan) for Norma meets the Care Plan requirement. This Care Plan has been established and reviewed with the Patient.    Counseling Resources:  ATP IV Guidelines  Pooled Cohorts Equation Calculator  Breast Cancer Risk Calculator  FRAX Risk Assessment  ICSI Preventive Guidelines  Dietary Guidelines for Americans, 2010  USDA's MyPlate  ASA Prophylaxis  Lung CA Screening    Carol Foreman MD  [facility] HIBBINGInjectable Influenza Immunization Documentation    1.  Is the person to be " vaccinated sick today?   No    2. Does the person to be vaccinated have an allergy to a component   of the vaccine?   No  Egg Allergy Algorithm Link    3. Has the person to be vaccinated ever had a serious reaction   to influenza vaccine in the past?   No    4. Has the person to be vaccinated ever had Guillain-Barré syndrome?   No    Form completed by   Erendira Sol

## 2017-11-30 NOTE — PATIENT INSTRUCTIONS

## 2017-12-05 ENCOUNTER — HOSPITAL ENCOUNTER (OUTPATIENT)
Dept: NUCLEAR MEDICINE | Facility: HOSPITAL | Age: 82
Setting detail: NUCLEAR MEDICINE
End: 2017-12-05
Attending: FAMILY MEDICINE
Payer: MEDICARE

## 2017-12-05 ENCOUNTER — HOSPITAL ENCOUNTER (OUTPATIENT)
Dept: NUCLEAR MEDICINE | Facility: HOSPITAL | Age: 82
Setting detail: NUCLEAR MEDICINE
Discharge: HOME | End: 2017-12-05
Attending: FAMILY MEDICINE | Admitting: FAMILY MEDICINE
Payer: MEDICARE

## 2017-12-05 ENCOUNTER — HOSPITAL ENCOUNTER (OUTPATIENT)
Dept: GENERAL RADIOLOGY | Facility: HOSPITAL | Age: 82
Setting detail: NUCLEAR MEDICINE
End: 2017-12-05
Attending: FAMILY MEDICINE
Payer: MEDICARE

## 2017-12-05 DIAGNOSIS — R07.89 OTHER CHEST PAIN: ICD-10-CM

## 2017-12-05 PROCEDURE — 25000128 H RX IP 250 OP 636: Performed by: INTERNAL MEDICINE

## 2017-12-05 PROCEDURE — 34300033 ZZH RX 343: Performed by: RADIOLOGY

## 2017-12-05 PROCEDURE — 78452 HT MUSCLE IMAGE SPECT MULT: CPT | Mod: TC

## 2017-12-05 PROCEDURE — A9500 TC99M SESTAMIBI: HCPCS | Performed by: RADIOLOGY

## 2017-12-05 PROCEDURE — 93017 CV STRESS TEST TRACING ONLY: CPT

## 2017-12-05 PROCEDURE — 93018 CV STRESS TEST I&R ONLY: CPT | Performed by: INTERNAL MEDICINE

## 2017-12-05 PROCEDURE — 93016 CV STRESS TEST SUPVJ ONLY: CPT | Performed by: INTERNAL MEDICINE

## 2017-12-05 RX ORDER — REGADENOSON 0.08 MG/ML
0.4 INJECTION, SOLUTION INTRAVENOUS ONCE
Status: COMPLETED | OUTPATIENT
Start: 2017-12-05 | End: 2017-12-05

## 2017-12-05 RX ADMIN — REGADENOSON 0.4 MG: 0.08 INJECTION, SOLUTION INTRAVENOUS at 09:17

## 2017-12-05 RX ADMIN — Medication 30 MILLICURIE: at 09:18

## 2017-12-05 RX ADMIN — Medication 10 MILLICURIE: at 07:00

## 2017-12-07 ENCOUNTER — TELEPHONE (OUTPATIENT)
Dept: FAMILY MEDICINE | Facility: OTHER | Age: 82
End: 2017-12-07

## 2017-12-07 DIAGNOSIS — R94.39 POSITIVE CARDIAC STRESS TEST: Primary | ICD-10-CM

## 2017-12-07 NOTE — TELEPHONE ENCOUNTER
8:34 AM    Reason for Call: Phone Call    Description: Pt called and stated she had a call from clinic to call back regarding her stress test. Please call her back at [phone]    Was an appointment offered for this call? No  If yes : Appointment type              Date    Preferred method for responding to this message: Telephone Call  What is your phone number ?    If we cannot reach you directly, may we leave a detailed response at the number you provided? Yes    Can this message wait until your PCP/provider returns, if available today? Not applicable    Nora Coulter

## 2017-12-08 ENCOUNTER — PRE VISIT (OUTPATIENT)
Dept: CARDIOLOGY | Facility: OTHER | Age: 82
End: 2017-12-08

## 2017-12-08 NOTE — TELEPHONE ENCOUNTER
"Office Visit     11/30/2017  [facility] Carol Salguero MD   Family Practice    Routine general medical examination at a health care facility +5 more   Dx    Physical , Musculoskeletal Problem , Flu Shot, Chest Pain , Toe discolored    Reason for visit    Nursing Note   Erendira Sol LPN (Licensed Nurse)           Chief Complaint   Patient presents with     Physical     Musculoskeletal Problem       right hip pain radiates down her leg         Initial /80  Pulse 57  Resp 20  Ht 5' 3\" (1.6 m)  Wt 187 lb (84.8 kg)  SpO2 97%  Breastfeeding? No  BMI 33.13 kg/m2 Estimated body mass index is 33.13 kg/(m^2) as calculated from the following:    Height as of this encounter: 5' 3\" (1.6 m).    Weight as of this encounter: 187 lb (84.8 kg).  Medication Reconciliation: complete   Erendira Sol            Progress Notes   Carol Foreman MD (Physician)     Family Practice   Expand All Collapse All       SUBJECTIVE:   Norma Banerjee is a 87 year old female who presents for Preventive Visit.        Are you in the first 12 months of your Medicare Part B coverage?  No     Healthy Habits:    Do you get at least three servings of calcium containing foods daily (dairy, green leafy vegetables, etc.)? No    Amount of exercise or daily activities, outside of work: not really    Problems taking medications regularly No    Medication side effects: No    Have you had an eye exam in the past two years? yes    Do you see a dentist twice per year? no    Do you have sleep apnea, excessive snoring or daytime drowsiness?no                         Chief Complaint   Patient presents with     Physical       mammogram done in July, 2017, negative. She would prefer not doing any further mammograms now as she is almost 88     Musculoskeletal Problem       left hip pain radiates down her leg, sometimes coming from the lumbar spine. This is a sharp pain, starting with walking but gets better as she walks     Flu Shot     " Chest Pain       she was seen in the ER 11-17-17 for chest pain that awakened her in the middle of the night radiating to her back and arms that lasted about an hour and resolved on its own. She went into the ER about 6 hours later and had a negative workup including a chest CT negative for AAA. She hasn't had any symptoms since and is able to walk the halls without pain.      Toe discolored       left great toe is discolored at times, no pain            Reviewed and updated as needed this visit by clinical staffTobacco  Allergies  Meds  Med Hx  Surg Hx  Fam Hx  Soc Hx         Reviewed and updated as needed this visit by Provider                Social History   Substance Use Topics     Smoking status: Former Smoker       Types: Cigarettes     Smokeless tobacco: Never Used         Comment: tried to quit yes, passive exposure no     Alcohol use No         The patient does not drink >3 drinks per day nor >7 drinks per week.      Today's PHQ-2 Score:   PHQ-2 ( 1999 Pf[employer]) 11/13/2014 11/11/2013   Q1: Little interest or pleasure in doing things 0 0   Q2: Feeling down, depressed or hopeless 0 0   PHQ-2 Score 0 0            Do you feel safe in your environment - Yes     Do you have a Health Care Directive?: Yes: Advance Directive has been received and scanned.     Current providers sharing in care for this patient include: Patient Care Team:  Carol oFreman MD as PCP - General (Family Practice)       Hearing impairment: No    Ability to successfully perform activities of daily living: Yes, no assistance needed     Fall risk:  Fallen 2 or more times in the past year?: No  Any fall with injury in the past year?: No       Home safety:  none identified             The following health maintenance items are reviewed in Epic and correct as of today:Health Maintenance   Topic Date Due     GALILEO QUESTIONNAIRE 1 YEAR  01/01/1948     INFLUENZA VACCINE (SYSTEM ASSIGNED)  09/01/2017     PHQ-9 Q1YR  10/31/2017     FALL RISK  ASSESSMENT  07/18/2018     MAMMO Q1 YR  07/18/2018     ADVANCE DIRECTIVE PLANNING Q5 YRS  10/31/2021     TETANUS IMMUNIZATION (SYSTEM ASSIGNED)  11/11/2023     PNEUMOCOCCAL  Addressed          BP Readings from Last 3 Encounters:   11/30/17 124/80   11/15/17 (!) 186/81   10/31/16 118/82         Wt Readings from Last 3 Encounters:   11/30/17 187 lb (84.8 kg)   11/15/17 185 lb (83.9 kg)   10/31/16 180 lb (81.6 kg)                           Patient Active Problem List   Diagnosis     Urinary incontinence     Osteoarthritis     ACP (advance care planning)     Incomplete tear of right rotator cuff      Past Surgical History            Past Surgical History:   Procedure Laterality Date     ARTHROSCOPY KNEE         Osteoarthritis, left     C THALLIUM STRESS TEST   2007     negative persantine thallium GXT, Chest pain     CATARACT IOL, RT/LT         bilateral cataract surgery     CHOLECYSTECTOMY         Cholecystitis     COLONOSCOPY   4/2008     free of disease     HC ECP WITH CATARACT SURGERY         JOINT REPLACEMENT         Osteoarthritis, bilateral     RELEASE CARPAL TUNNEL   2011     RT     SURGICAL PATHOLOGY EXAM         ovarian mass, laparotomy                  Social History   Substance Use Topics     Smoking status: Former Smoker       Types: Cigarettes     Smokeless tobacco: Never Used         Comment: tried to quit yes, passive exposure no     Alcohol use No      Family History             Family History   Problem Relation Age of Onset     Alzheimer Disease Mother 92       cause of death     C.A.D. Father       CANCER Mother         uterine     CEREBROVASCULAR DISEASE Father 72       cva, cause of death     DIABETES Sister       Hypertension Sister       Hypertension Sister       Hypertension Sister                Current Outpatient Prescriptions           Current Outpatient Prescriptions   Medication Sig Dispense Refill     aspirin 81 MG tablet Take 1 tablet (81 mg) by mouth daily   OTC     Acetaminophen (TYLENOL  "PO) Take by mouth At Bedtime                   Allergies   Allergen Reactions     Alendronate Sodium         Hot tolerance  Fosamax     Nitrofurantoin         Chill  Fever  Chest pain  Macrobid     Nitrofurantoin Macrocrystal         Chills   Fever  Chest pain  Macrobid     Propoxyphene Napsylate         Darvocet-N 100     Sulfamethoxazole Rash       Bactrim DS     Trimethoprim Rash       Bactrim DS               Mammogram Screening: discussed, further mammograms declined     ROS:  C: NEGATIVE for fever, chills, change in weight  I: NEGATIVE for worrisome rashes, moles or lesions  E: NEGATIVE for vision changes or irritation  E/M: NEGATIVE for ear, mouth and throat problems  R: NEGATIVE for significant cough or SOB  B: NEGATIVE for masses, tenderness or discharge  CV: NEGATIVE for chest pain, palpitations or peripheral edema  GI: NEGATIVE for nausea, abdominal pain, heartburn, or change in bowel habits  : NEGATIVE for frequency, dysuria, or hematuria  M: NEGATIVE for significant arthralgias or myalgia  N: NEGATIVE for weakness, dizziness or paresthesias  E: NEGATIVE for temperature intolerance, skin/hair changes  H: NEGATIVE for bleeding problems  P: NEGATIVE for changes in mood or affect     OBJECTIVE:   /80  Pulse 57  Resp 20  Ht 5' 3\" (1.6 m)  Wt 187 lb (84.8 kg)  SpO2 97%  Breastfeeding? No  BMI 33.13 kg/m2 Estimated body mass index is 33.13 kg/(m^2) as calculated from the following:    Height as of this encounter: 5' 3\" (1.6 m).    Weight as of this encounter: 187 lb (84.8 kg).  EXAM:   GENERAL APPEARANCE: healthy, alert and no distress  EYES: Eyes grossly normal to inspection, PERRL and conjunctivae and sclerae normal  HENT: ear canals and TM's normal, nose and mouth without ulcers or lesions, oropharynx clear and oral mucous membranes moist  NECK: no adenopathy, no asymmetry, masses, or scars and thyroid normal to palpation  RESP: lungs clear to auscultation - no rales, rhonchi or " "wheezes  BREAST: normal without masses, tenderness or nipple discharge and no palpable axillary masses or adenopathy  CV: regular rate and rhythm, normal S1 S2, no S3 or S4, no murmur, click or rub, no peripheral edema and peripheral pulses strong  ABDOMEN: soft, nontender, no hepatosplenomegaly, no masses and bowel sounds normal  MS: no musculoskeletal defects are noted and gait is age appropriate without ataxia  SKIN: no suspicious lesions or rashes  NEURO: Normal strength and tone, sensory exam grossly normal, mentation intact and speech normal  PSYCH: mentation appears normal and affect normal/bright     ASSESSMENT / PLAN:   1. Routine general medical examination at a [facility]  Mammogram done last July. Labs done 2 weeks ago in the ER. Lipid profile done 1 year ago is excellent, not repeated today, she isn't fasting     2. Need for prophylactic vaccination and inoculation against influenza     - HC FLU VACCINE, INCREASED ANTIGEN, PRESV FREE  - Vaccine Administration, Initial [12261]     3. Hip pain, left  Will check x rays, no pain with ROM today  - XR HIP LT G/E 2 VW (Clinic Performed); Future     4. Lumbar radiculopathy  left  - XR LUMBAR SPINE 2/3 VIEWS (Clinic Performed); Future     5. Other chest pain  Will evaluate further  - NM Lexiscan stress test; Future     6. Toe cyanosis  Normal DP pulses of the left foot, foot is cool,discussed layering to keep extremities warm     End of Life Planning:  Patient currently has an advanced directive: Yes.  Practitioner is supportive of decision.     COUNSELING:  Reviewed preventive health counseling, as reflected in patient instructions       Regular exercise       Healthy diet/nutrition           Estimated body mass index is 33.13 kg/(m^2) as calculated from the following:    Height as of this encounter: 5' 3\" (1.6 m).    Weight as of this encounter: 187 lb (84.8 kg).  Weight management plan: Discussed healthy diet and exercise guidelines and patient will " follow up in 12 months in clinic to re-evaluate.   reports that she has quit smoking. Her smoking use included Cigarettes. She has never used smokeless tobacco.           Troponin I   Collected:  11/15/2017  9:50 AM Order: [id number]          Ref Range & Units 3wk ago       Troponin I ES 0.000 - 0.045 ug/L <0.015     Comments: The 99th percentile for upper reference range is 0.045 ug/L.  Troponin values   in the range of 0.045 - 0.120 ug/L may be associated with risks of adverse   clinical events.        View Full Report                       Comprehensive metabolic panel   Collected:  11/15/2017  9:50 AM Order: [id number]          Ref Range & Units 3wk ago       Sodium 133 - 144 mmol/L 139     Potassium 3.4 - 5.3 mmol/L 4.0     Chloride 94 - 109 mmol/L 104     Carbon Dioxide 20 - 32 mmol/L 27     Anion Gap 3 - 14 mmol/L 8     Glucose 70 - 99 mg/dL 132 (H)     Urea Nitrogen 7 - 30 mg/dL 20     Creatinine 0.52 - 1.04 mg/dL 0.86     GFR Estimate >60 mL/min/1.7m2 62     Comments: Non  GFR Calc     GFR Estimate If Black >60 mL/min/1.7m2 75     Comments:  GFR Calc     Calcium 8.5 - 10.1 mg/dL 9.3     Bilirubin Total 0.2 - 1.3 mg/dL 0.4     Albumin 3.4 - 5.0 g/dL 3.7     Protein Total 6.8 - 8.8 g/dL 7.9     Alkaline Phosphatase 40 - 150 U/L 101     ALT 0 - 50 U/L 17     AST 0 - 45 U/L 18     View Full Report                        CBC with platelets differential   Collected:  11/15/2017  9:50 AM Order: [id number]          Ref Range & Units 3wk ago       WBC 4.0 - 11.0 10e9/L 7.1     RBC Count 3.8 - 5.2 10e12/L 4.24     Hemoglobin 11.7 - 15.7 g/dL 13.2     Hematocrit 35.0 - 47.0 % 38.6     MCV 78 - 100 fl 91     MCH 26.5 - 33.0 pg 31.1     MCHC 31.5 - 36.5 g/dL 34.2     RDW 10.0 - 15.0 % 12.5     Platelet Count 150 - 450 10e9/L 190     Diff Method  Automated Method     % Neutrophils % 57.1     % Lymphocytes % 29.6     % Monocytes % 9.6     % Eosinophils % 3.0     % Basophils % 0.4     %  Immature Granulocytes % 0.3     Nucleated RBCs 0 /100 0     Absolute Neutrophil 1.6 - 8.3 10e9/L 4.1     Absolute Lymphocytes 0.8 - 5.3 10e9/L 2.1     Absolute Monocytes 0.0 - 1.3 10e9/L 0.7     Absolute Eosinophils 0.0 - 0.7 10e9/L 0.2     Absolute Basophils 0.0 - 0.2 10e9/L 0.0     Abs Immature Granulocytes 0 - 0.4 10e9/L 0.0     Absolute Nucleated RBC  0.0     View Full Report                       Lipid Profile   Collected:  10/31/2016 10:17 AM Order: [id number]              Ref Range & Units 1yr ago       Cholesterol <200 mg/dL 148     Triglycerides <150 mg/dL 147     Comments: Fasting specimen     HDL Cholesterol >49 mg/dL 46 (L)     LDL Cholesterol Calculated <100 mg/dL 73     Comments: Desirable:       <100 mg/dl     Non HDL Cholesterol <130 mg/dL 102     View Full Report                        Folate   Collected:  10/31/2016 10:17 AM Order: [id number]       Notes Recorded by Carol Foreman MD on 11/1/2016 at 4:16 PM  B12 and folate are normal        Ref Range & Units 1yr ago       Folate >5.4 ng/mL 39.9     View Full Report                        Vitamin B12   Collected:  10/31/2016 10:17 AM Order: [id number]       Notes Recorded by Carol Foreman MD on 11/1/2016 at 4:16 PM  B12 and folate are normal        Ref Range & Units 1yr ago       Vitamin B12 193 - 986 pg/mL 730     Comments: Interp: 247-911 = Normal     View Full Report                        TSH with free T4 reflex   Collected:  10/31/2016 10:17 AM Order: [id number]              Ref Range & Units 1yr ago       TSH 0.40 - 4.00 mU/L 2.54     View Full Report                   Current Outpatient Prescriptions   Medication     aspirin 81 MG tablet     Acetaminophen (TYLENOL PO)     No current facility-administered medications for this visit.         Allergies   Allergen Reactions     Alendronate Sodium      Hot tolerance  Fosamax       Nitrofurantoin      Chill  Fever  Chest pain  Macrobid       Nitrofurantoin Macrocrystal      Chills   Fever  Chest  pain  Macrobid       Propoxyphene Napsylate      Darvocet-N 100       Sulfamethoxazole Rash     Bactrim DS     Trimethoprim Rash     Bactrim DS       Final  Norma Banerjee         MRN:  [id number]       :    1930     [facility] Acct:  [id number] Pt Class:  Nuclear Medicine   [facility] Adm:   Dsch:     [address] [place] [phone] Phone: [phone] Sex:  Female          IMAGING REPORT      Exam:  Stress EKG Interpretation       ACN:[id number]      Date/Time Completed:   2017 1034                                                                    Authorizing Provider:  Carol Foreman  Ordering Provider:  Carol Foreman  Attending Provider:    TRISTON Provider:    ______________________________________________________________________________________        This is a Lexiscan Cardiolite study on Norma Banerjee ordered by Dr Loree Foreman for chest pain.     Pt was placed upon the table using our protocol and given 0.4 mg of IV  Lexiscan bolus.  She was monitored continuously throughout the study.   Resting HR was 55 and went to 91.  Resting BP was 148/88 and went to  122/62.  She had transient leg tingling.     Review of the ECG shows no acute changes or arrhythmias.     Assessment:  Lexiscan Cardiolite study  in which patient had  appropriate HR and BP responses.  Transient symptoms and no acute ECG  changes.  The cardiolite scans are pending.     JOSE HARDEN MD  ______________________________________________________________________________________  End of diagnostic Imaging report for accession:  [id number]      ______________________________________________________________________________________      Read By: Jose Harden MD  Signed by: Jose Harden MD on 2017  4:36 PM     ED  Discharged      11/15/2017 (2 hours)  [facility]    Darvin Cortez PA-C   Attending   Treatment team    Chest pain of unknown etiology   Clinical  "impression    Chest Pain    Chief complaint    ED Provider Notes   Expand All Collapse All       History           Chief Complaint   Patient presents with     Chest Pain       last night at 0300 lasting 10-15 minutes      The history is provided by the patient.      Norma Banerjee is a 87 year old female who presented to the ED for evaluation of chest pain.  Her pain began last night at approx 0300.  Described as anterior and generalized.  \"Agravating.\"  Lasted approx 15 minutes.  No associated dyspnea. No nausea.  No diaphoresis.  No abdominal pain.  No cough. Pain free since approx 0315.  During her ROS, she does admit to changes in back pain.  She has chronic mild low back pain that over the last week has worsened and changed location to the mid and upper back with radiation to her arms.  No pain currently.      Problem List:          Patient Active Problem List     Diagnosis Date Noted     ACP (advance care planning) 10/31/2016       Priority: Medium       Advance Care Planning 11/14/2016: Receipt of ACP document:  Received: POLST which was signed and dated by provider on 11/3/16.  Document previously scanned on 11/8/16.  Order reviewed and found to be valid.  Code Status reflects choices in most recent ACP document.  Confirmed/documented designated decision maker(s).  Added by Melba Luu  Advance Care Planning 10/31/2016: ACP Review of Chart / Resources Provided:  Reviewed chart for advance care plan.  Norma Banerjee has no plan or code status on file. Discussed available resources and provided with information. Confirmed code status reflects current choices pending further ACP discussions.  Confirmed/documented legally designated decision makers.  Added by Mariana Richards                 Incomplete tear of right rotator cuff 10/31/2016       Priority: Medium     Osteoarthritis 10/01/2012       Priority: Medium       Problem list name updated by automated process. Provider to review     Urinary " incontinence 11/16/2006       Priority: Medium       Problem list name updated by automated process. Provider to review         Past Medical History:         Past Medical History:   Diagnosis Date     Carpal tunnel syndrome 02/14/2011     Chest pain, atypical 10/26/2007     Dermoid cyst of ovary 03/05/2008     Gout 09/26/2011     Osteoarthritis 10/01/2012     Recurrent UTI 09/26/2011     Urinary incontinence 11/16/2006     Weakness of both legs 09/26/2011         Past Surgical History:     Past Surgical History          Past Surgical History:   Procedure Laterality Date     ARTHROSCOPY KNEE         Osteoarthritis, left     C THALLIUM STRESS TEST   2007     negative persantine thallium GXT, Chest pain     CATARACT IOL, RT/LT         bilateral cataract surgery     CHOLECYSTECTOMY         Cholecystitis     COLONOSCOPY   4/2008     free of disease     HC ECP WITH CATARACT SURGERY         JOINT REPLACEMENT         Osteoarthritis, bilateral     RELEASE CARPAL TUNNEL   2011     RT     SURGICAL PATHOLOGY EXAM         ovarian mass, laparotomy            Family History:     Family History           Family History   Problem Relation Age of Onset     Alzheimer Disease Mother 92       cause of death     C.A.D. Father       CANCER Mother         uterine     CEREBROVASCULAR DISEASE Father 72       cva, cause of death     DIABETES Sister       Hypertension Sister       Hypertension Sister       Hypertension Sister              Social History:  Marital Status:  Single [1]         Social History   Substance Use Topics     Smoking status: Former Smoker       Types: Cigarettes     Smokeless tobacco: Never Used         Comment: tried to quit yes, passive exposure no     Alcohol use No         Medications:       aspirin 81 MG tablet   Acetaminophen (TYLENOL PO)   Elastic Bandages & Supports (ACE ARM SLING) MISC            Review of Systems   Constitutional: Negative for activity change, appetite change, chills, fatigue and fever.  "  Respiratory: Negative for chest tightness and shortness of breath.    Cardiovascular: Positive for chest pain. Negative for palpitations.        Resolved early this morning    Gastrointestinal: Negative for abdominal pain, nausea and vomiting.   Genitourinary: Negative.    Musculoskeletal: Positive for back pain. Negative for neck pain.   Skin: Negative.    Neurological: Negative for dizziness, weakness, light-headedness and headaches.   Hematological: Does not bruise/bleed easily.         Physical Exam   BP: 179/75  Heart Rate: 86  Temp: 97.8  F (36.6  C)  Resp: 16  Height: 160 cm (5' 3\")  Weight: 83.9 kg (185 lb)  SpO2: 97 %        Physical Exam   Constitutional: She is oriented to person, place, and time. She appears well-developed and well-nourished. No distress.   Pleasant and talkative    HENT:   Head: Normocephalic and atraumatic.   Eyes: Conjunctivae and EOM are normal.   Neck: Normal range of motion. Neck supple.   Cardiovascular: Normal rate and regular rhythm.    Pulmonary/Chest: Effort normal and breath sounds normal.   Abdominal: Soft. There is no tenderness. There is no guarding.   Musculoskeletal: She exhibits no edema.   Neurological: She is alert and oriented to person, place, and time.   Skin: Skin is warm and dry.   Psychiatric: She has a normal mood and affect.   Nursing note and vitals reviewed.        ED Course      ED Course      Procedures           EKG shows a sinus rhythm with PAC.  No evidence of ST or T wave concerns.       CXR shows no focal infiltrate, PTX, or widened mediastinum.      Medications   iopamidol (ISOVUE-370) solution 75 mL (75 mLs Intravenous Given 11/15/17 1128)   sodium chloride (PF) 0.9% PF flush 100 mL (100 mLs Intravenous Given 11/15/17 1128)   aspirin chewable tablet 162 mg (162 mg Oral Given 11/15/17 1229)            Results for orders placed or performed during the hospital encounter of 11/15/17   XR Chest 2 Views     Narrative     XR CHEST 2 VW     HISTORY: 87 " years Female chest pain;      COMPARISON: None     TECHNIQUE: 2 views of the chest were obtained.     FINDINGS: Two views of the chest were obtained. Heart size and  pulmonary vascularity are within normal limits, lungs are clear both  views. No consolidating air space opacities are present.     There are 2 or 3 small dense nodular opacities in the mid right lung  seen only on the PA projection.     Impression     IMPRESSION: Small dense nodular opacities mid right lung, suspect  calcified granulomas. Lungs otherwise clear.     JAGRUTI BANKS MD   CT Chest Angio w and w/o contrast     Narrative     CTA ANGIOGRAM CHEST CONTRAST  11/15/2017 11:36 AM     CLINICAL HISTORY: Female, age 87 years,  Dissection; ;     Comparison:  None     TECHNIQUE:  CT was performed of the chest  with IV contrast.   Sagittal, coronal and axial reconstructions were reviewed.      FINDINGS:  Chest CT:   Lungs : Minimal dependent atelectasis.  Thyroid: Asymmetric enlargement of the right lobe.  Heart and Great Vessels:  Good quality examination demonstrates no  evidence of aortic dissection. There is small volume of calcified and  noncalcified plaque seen throughout the thoracic aorta.     Lymph Nodes:  Normal.  Pleura: Calcified and noncalcified pleural plaques posteriorly within  the right hemithorax.  Bony Structures:  Mild degenerative changes of the thoracic spine.  Esophagus/duodenum: Mild circumferential wall thickening in its distal  aspect.     Visualized portions of the abdomen:  Liver:  The visualized portions are normal.  Gallbladder: Surgically absent.  Spleen: The visualized portions are normal.  Pancreas: The visualized portions are normal.  Adrenal Glands: Normal  Kidneys: 3 cm cortical cyst at the upper pole the left kidney.  Cortical scarring in the right kidney and small cortical cysts.  Ureters: Not included.  Abdominal Aorta: The visualized portions are normal.     Other Findings:  No lymphadenopathy or ascites in the  upper abdomen.     Impression     IMPRESSION:  1.   Good quality examination demonstrates no evidence of dissection.     2.  Mild circumferential wall thickening of the distal esophagus,  possibly related to an esophagitis.     3.  Cortical scarring the right kidney and bilateral cortical cysts of  the kidneys.     4.  Calcified pleural plaques of the right hemithorax.     MATT KAUFMAN MD   CBC with platelets differential   Result Value Ref Range     WBC 7.1 4.0 - 11.0 10e9/L     RBC Count 4.24 3.8 - 5.2 10e12/L     Hemoglobin 13.2 11.7 - 15.7 g/dL     Hematocrit 38.6 35.0 - 47.0 %     MCV 91 78 - 100 fl     MCH 31.1 26.5 - 33.0 pg     MCHC 34.2 31.5 - 36.5 g/dL     RDW 12.5 10.0 - 15.0 %     Platelet Count 190 150 - 450 10e9/L     Diff Method Automated Method       % Neutrophils 57.1 %     % Lymphocytes 29.6 %     % Monocytes 9.6 %     % Eosinophils 3.0 %     % Basophils 0.4 %     % Immature Granulocytes 0.3 %     Nucleated RBCs 0 0 /100     Absolute Neutrophil 4.1 1.6 - 8.3 10e9/L     Absolute Lymphocytes 2.1 0.8 - 5.3 10e9/L     Absolute Monocytes 0.7 0.0 - 1.3 10e9/L     Absolute Eosinophils 0.2 0.0 - 0.7 10e9/L     Absolute Basophils 0.0 0.0 - 0.2 10e9/L     Abs Immature Granulocytes 0.0 0 - 0.4 10e9/L     Absolute Nucleated RBC 0.0     Comprehensive metabolic panel   Result Value Ref Range     Sodium 139 133 - 144 mmol/L     Potassium 4.0 3.4 - 5.3 mmol/L     Chloride 104 94 - 109 mmol/L     Carbon Dioxide 27 20 - 32 mmol/L     Anion Gap 8 3 - 14 mmol/L     Glucose 132 (H) 70 - 99 mg/dL     Urea Nitrogen 20 7 - 30 mg/dL     Creatinine 0.86 0.52 - 1.04 mg/dL     GFR Estimate 62 >60 mL/min/1.7m2     GFR Estimate If Black 75 >60 mL/min/1.7m2     Calcium 9.3 8.5 - 10.1 mg/dL     Bilirubin Total 0.4 0.2 - 1.3 mg/dL     Albumin 3.7 3.4 - 5.0 g/dL     Protein Total 7.9 6.8 - 8.8 g/dL     Alkaline Phosphatase 101 40 - 150 U/L     ALT 17 0 - 50 U/L     AST 18 0 - 45 U/L   Troponin I   Result Value Ref Range      "Troponin I ES <0.015 0.000 - 0.045 ug/L         Critical Care time:  none                        Labs Ordered and Resulted from Time of ED Arrival Up to the Time of Departure from the ED   COMPREHENSIVE METABOLIC PANEL - Abnormal; Notable for the following:        Result Value      Glucose 132 (*)       All other components within normal limits   CBC WITH PLATELETS DIFFERENTIAL   TROPONIN I   PERIPHERAL IV CATHETER         Assessments & Plan (with Medical Decision Making)   Pain had entirely resolved 6 hours prior to presenting to the ED.  No associated symptoms of ACS.  I did have a concern about a possible aortic issue with her new onset upper back pain and arm radiculopathy, as well as the new chest pain.  This was negative.  Treated with ASA and advised starting a baby aspirin daily.  Needs close follow-up with PCP.  I stressed the utmost importance of returning HERE for ANY return of pain, pain that changes in quality, location, or character, dyspnea, diaphoresis, or ANY other questions or concerns.  Ms. Banerjee tells me \"I haven't felt this good in weeks.\"  She is quite happy and agreeable.  Discharged in stable and good condition, ambulatory without assistance, completely asymptomatic.       I have reviewed the nursing notes.     I have reviewed the findings, diagnosis, plan and need for follow up with the patient.            Discharge Medication List as of 11/15/2017 12:18 PM           START taking these medications     Details   aspirin 81 MG tablet Take 1 tablet (81 mg) by mouth daily, R-OTC, OTC                 Final diagnoses:   Chest pain of unknown etiology         11/15/2017   [facility]     Darvin Cortez PA-C  11/15/17 1326             "

## 2017-12-13 ENCOUNTER — OFFICE VISIT (OUTPATIENT)
Dept: CARDIOLOGY | Facility: OTHER | Age: 82
End: 2017-12-13
Attending: FAMILY MEDICINE
Payer: COMMERCIAL

## 2017-12-13 VITALS
DIASTOLIC BLOOD PRESSURE: 60 MMHG | BODY MASS INDEX: 34.96 KG/M2 | WEIGHT: 190 LBS | SYSTOLIC BLOOD PRESSURE: 163 MMHG | HEIGHT: 62 IN | RESPIRATION RATE: 18 BRPM | TEMPERATURE: 97.3 F | HEART RATE: 63 BPM | OXYGEN SATURATION: 97 %

## 2017-12-13 DIAGNOSIS — R94.39 ABNORMAL STRESS TEST: Primary | ICD-10-CM

## 2017-12-13 DIAGNOSIS — R07.89 ATYPICAL CHEST PAIN: ICD-10-CM

## 2017-12-13 PROCEDURE — 99204 OFFICE O/P NEW MOD 45 MIN: CPT | Performed by: INTERNAL MEDICINE

## 2017-12-13 PROCEDURE — 93010 ELECTROCARDIOGRAM REPORT: CPT | Performed by: INTERNAL MEDICINE

## 2017-12-13 PROCEDURE — 99203 OFFICE O/P NEW LOW 30 MIN: CPT

## 2017-12-13 PROCEDURE — 99213 OFFICE O/P EST LOW 20 MIN: CPT | Mod: 25

## 2017-12-13 PROCEDURE — 93005 ELECTROCARDIOGRAM TRACING: CPT

## 2017-12-13 ASSESSMENT — PATIENT HEALTH QUESTIONNAIRE - PHQ9
5. POOR APPETITE OR OVEREATING: NOT AT ALL
SUM OF ALL RESPONSES TO PHQ QUESTIONS 1-9: 3

## 2017-12-13 ASSESSMENT — PAIN SCALES - GENERAL: PAINLEVEL: NO PAIN (0)

## 2017-12-13 ASSESSMENT — ANXIETY QUESTIONNAIRES
3. WORRYING TOO MUCH ABOUT DIFFERENT THINGS: NOT AT ALL
6. BECOMING EASILY ANNOYED OR IRRITABLE: NOT AT ALL
IF YOU CHECKED OFF ANY PROBLEMS ON THIS QUESTIONNAIRE, HOW DIFFICULT HAVE THESE PROBLEMS MADE IT FOR YOU TO DO YOUR WORK, TAKE CARE OF THINGS AT HOME, OR GET ALONG WITH OTHER PEOPLE: NOT DIFFICULT AT ALL
5. BEING SO RESTLESS THAT IT IS HARD TO SIT STILL: SEVERAL DAYS
1. FEELING NERVOUS, ANXIOUS, OR ON EDGE: NOT AT ALL
2. NOT BEING ABLE TO STOP OR CONTROL WORRYING: NOT AT ALL
7. FEELING AFRAID AS IF SOMETHING AWFUL MIGHT HAPPEN: NOT AT ALL
GAD7 TOTAL SCORE: 1

## 2017-12-13 NOTE — NURSING NOTE
"Chief Complaint   Patient presents with     Consult     Referral Dr. Carol Foreman, positive cardiac stress test       Initial /60 (BP Location: Left arm, Patient Position: Chair, Cuff Size: Adult Large)  Pulse 63  Temp 97.3  F (36.3  C) (Tympanic)  Resp 18  Ht 1.575 m (5' 2\")  Wt 86.2 kg (190 lb)  SpO2 97%  BMI 34.75 kg/m2 Estimated body mass index is 34.75 kg/(m^2) as calculated from the following:    Height as of this encounter: 1.575 m (5' 2\").    Weight as of this encounter: 86.2 kg (190 lb).  Medication Reconciliation: complete   Carol Zarate      "

## 2017-12-13 NOTE — PATIENT INSTRUCTIONS
You were seen by Dr. Rodriguez, 12/13/2017.     1.  Please report any increase in symptoms, or reoccurrence of symptoms to the cardiology department as you may need to be seen sooner.     2. Continue the use of daily aspirin.  Aspirin is known to reduce the risk of heart attack and stroke.      3. Continue exercise as tolerated.       You will follow up with Dr. Rodriguez as needed.       Please call the cardiology office with problems, questions, or concerns at [phone].    If you experience chest pain, chest pressure, chest tightness, shortness of breath, fainting, lightheadedness, nausea, vomiting, or other concerning symptoms, please report to the Emergency Department or call 911. These symptoms may be emergent, and best treated in the Emergency Department.       Maryanne HEADLEY RN-BSN  Cardiology   [facility]  [phone]

## 2017-12-13 NOTE — MR AVS SNAPSHOT
After Visit Summary   12/13/2017    Norma Banerjee    MRN: [id number]           Patient Information     Date Of Birth          1/1/1930        Visit Information        Provider Department      12/13/2017 2:00 PM Ryne Rodriguez, DO [facility]        Today's Diagnoses     Positive cardiac stress test          Care Instructions    You were seen by Dr. Rodriguez, 12/13/2017.     1.  Please report any increase in symptoms, or reoccurrence of symptoms to the cardiology department as you may need to be seen sooner.     2. Continue the use of daily aspirin.  Aspirin is known to reduce the risk of heart attack and stroke.      3. Continue exercise as tolerated.       You will follow up with Dr. Rodriguez as needed.       Please call the cardiology office with problems, questions, or concerns at [phone].    If you experience chest pain, chest pressure, chest tightness, shortness of breath, fainting, lightheadedness, nausea, vomiting, or other concerning symptoms, please report to the Emergency Department or call 911. These symptoms may be emergent, and best treated in the Emergency Department.       Maryanne HEADLEY RN-BSN  Cardiology   [facility]  [phone]              Follow-ups after your visit        Who to contact     If you have questions or need follow up information about today's clinic visit or your schedule please contact [facility] directly at [phone].  Normal or non-critical lab and imaging results will be communicated to you by MyChart, letter or phone within 4 business days after the clinic has received the results. If you do not hear from us within 7 days, please contact the clinic through MyChart or phone. If you have a critical or abnormal lab result, we will notify you by phone as soon as possible.  Submit refill requests through [employer] or call your pharmacy and they will forward the refill request to us. Please allow 3 business days for your  "refill to be completed.          Additional Information About Your Visit        MyChart Information     [employer] gives you secure access to your electronic health record. If you see a primary care provider, you can also send messages to your care team and make appointments. If you have questions, please call your primary care clinic.  If you do not have a primary care provider, please call [phone] and they will assist you.        Care EveryWhere ID     This is your Care EveryWhere ID. This could be used by other organizations to access your [facility] medical records  [id number]        Your Vitals Were     Pulse Temperature Respirations Height Pulse Oximetry BMI (Body Mass Index)    63 97.3  F (36.3  C) (Tympanic) 18 1.575 m (5' 2\") 97% 34.75 kg/m2       Blood Pressure from Last 3 Encounters:   12/13/17 163/60   11/30/17 124/80   11/15/17 (!) 186/81    Weight from Last 3 Encounters:   12/13/17 86.2 kg (190 lb)   11/30/17 84.8 kg (187 lb)   11/15/17 83.9 kg (185 lb)              Today, you had the following     No orders found for display       Primary Care Provider Office Phone # Fax #    Carol Foreman [phone]       [facility] [place] [address] [facility] [address]        Equal Access to Services     ROSEANN JAMIL : Hadii aad ku hadasho Soomaali, waaxda luqadaha, qaybta kaalmada adeegyada, nii veronica . So [facility] [phone].    ATENCIÓN: Si habla español, tiene a oliva disposición servicios gratuitos de asistencia lingüística. Llame al [phone].    We comply with applicable federal civil rights laws and [place] laws. We do not discriminate on the basis of race, color, national origin, age, disability, sex, sexual orientation, or gender identity.            Thank you!     [facility] you [facility]  [facility]. Our goal is always to provide you with excellent care. Hearing back from our patients is one way we can continue to " improve our services. Please take a few minutes to complete the written survey that you may receive in the mail after your visit with us. Thank you!             Your Updated Medication List - Protect others around you: Learn how to safely use, store and throw away your medicines at www.disposemymeds.org.          This list is accurate as of: 12/13/17  2:40 PM.  Always use your most recent med list.                   Brand Name Dispense Instructions for use Diagnosis    aspirin 81 MG tablet      Take 1 tablet (81 mg) by mouth daily        TYLENOL PO      Take by mouth At Bedtime

## 2017-12-13 NOTE — PROGRESS NOTES
[facility] HEART CARE   CARDIOLOGY CONSULT     Norma Banerjee     Carol Foreman     Chief Complaint   Patient presents with     Consult     Referral Dr. Carol Foreman, positive cardiac stress test        HPI:   Mrs Banerjee is being seen with concerns for an abnormal stress test. Was seen in the ER on 11/15/17.  She has limited past medical history.    She had a stress test on 12/5/17. 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    She was in the ER on 11/15/17. She describes generalized pain to her chest with pain in her lower back radiating up to her upper back which woke her up at approximately 3 AM.  It lasted for 10 or 15 minutes and then was gone on it's own.  he waited to get up as she did not have her cane/walker next to her bed. She wasn't sure if she would be unstable on her feet. She went and sat in a chair.  She had coffee with her daughter the next morning and did not report these findings.  She walked around her building 4 times which sounds like it's approximately 2000 steps or a mile. She regular walks up to 6 miles a day without problems. She did not have discomfort with activity. She discussed her symptoms with a friend who has had a heart attack in the past and was insistent that she be seen in the ER.  50 years ago, she was given nitroglycerin for chest pain.  However, since that time and after this most recent ER visit, she has had no more chest discomfort.    She was evaluated in the ER and was without significant findings. Her labs were normal, including a troponin. Her EKG was without acute change, but had a PAC. She was told to follow up with her primary care physician. She went on to have a stress test as described above.    She denies diabetes. She has remote history of smoking, quitting 61 years ago.  She quit at age 27.  Her cholesterol which was checked in 2016, was normal with a total cholesterol of  148.    Today, the stress test results were provided to her. Because it was somewhat of an equivocal stress test with reported positive findings, she was offered a CT scan of her coronaries versus an angiogram. She clearly was not interested in additional testing. She has not had recurrence of her symptoms. If she does have reoccurring symptoms, she needs to be evaluated and this was explained to her. The other option would be medical treatment if symptoms recur/progress. She has no additional concerns or complaints.    IMAGING RESULTS:   Jose Harden MD 12/5/2017    Narrative         This is a Lexiscan Cardiolite study on Norma Banerjee ordered by Dr Loree Foreman for chest pain.    Pt was placed upon the table using our protocol and given 0.4 mg of IV  Lexiscan bolus.  She was monitored continuously throughout the study.   Resting HR was 55 and went to 91.  Resting BP was 148/88 and went to  122/62.  She had transient leg tingling.    Review of the ECG shows no acute changes or arrhythmias.    Assessment:  Lexiscan Cardiolite study  in which patient had  appropriate HR and BP responses.  Transient symptoms and no acute ECG  changes.  The Cardiolite scans are pending.     CTA ANGIOGRAM CHEST CONTRAST  11/15/2017 11:36 AM     CLINICAL HISTORY: Female, age 87 years,  Dissection; ;     Comparison:  None     TECHNIQUE:  CT was performed of the chest  with IV contrast.   Sagittal, coronal and axial reconstructions were reviewed.      FINDINGS:  Chest CT:   Lungs : Minimal dependent atelectasis.  Thyroid: Asymmetric enlargement of the right lobe.  Heart and Great Vessels:  Good quality examination demonstrates no  evidence of aortic dissection. There is small volume of calcified and  noncalcified plaque seen throughout the thoracic aorta.     Lymph Nodes:  Normal.  Pleura: Calcified and noncalcified pleural plaques posteriorly within  the right hemithorax.  Bony Structures:  Mild degenerative changes of the  thoracic spine.  Esophagus/duodenum: Mild circumferential wall thickening in its distal  aspect.     Visualized portions of the abdomen:  Liver:  The visualized portions are normal.  Gallbladder: Surgically absent.  Spleen: The visualized portions are normal.  Pancreas: The visualized portions are normal.  Adrenal Glands: Normal  Kidneys: 3 cm cortical cyst at the upper pole the left kidney.  Cortical scarring in the right kidney and small cortical cysts.  Ureters: Not included.  Abdominal Aorta: The visualized portions are normal.     Other Findings:  No lymphadenopathy or ascites in the upper abdomen.         IMPRESSION:  1.   Good quality examination demonstrates no evidence of dissection.     2.  Mild circumferential wall thickening of the distal esophagus,  possibly related to an esophagitis.     3.  Cortical scarring the right kidney and bilateral cortical cysts of  the kidneys.     4.  Calcified pleural plaques of the right hemithorax.      PAST MEDICAL HISTORY:   Past Medical History:   Diagnosis Date     Carpal tunnel syndrome 02/14/2011     Chest pain, atypical 10/26/2007    negative persantine thallium stress test     Dermoid cyst of ovary 03/05/2008    removed     Gout 09/26/2011     Osteoarthritis 10/01/2012     Recurrent UTI 09/26/2011     Urinary incontinence 11/16/2006     Weakness of both legs 09/26/2011          FAMILY HISTORY:   Family History   Problem Relation Age of Onset     Alzheimer Disease Mother 92     cause of death     CANCER Mother      uterine     C.A.D. Father      CEREBROVASCULAR DISEASE Father 72     cva, cause of death     DIABETES Sister      Hypertension Sister      Hypertension Sister      Hypertension Sister           PAST SURGICAL HISTORY:   Past Surgical History:   Procedure Laterality Date     ARTHROSCOPY KNEE      Osteoarthritis, left     C THALLIUM STRESS TEST  2007    negative persantine thallium GXT, Chest pain     CATARACT IOL, RT/LT      bilateral cataract surgery      CHOLECYSTECTOMY      Cholecystitis     COLONOSCOPY  4/2008    free of disease     HC ECP WITH CATARACT SURGERY       JOINT REPLACEMENT      Osteoarthritis, bilateral     RELEASE CARPAL TUNNEL  2011    RT     SURGICAL PATHOLOGY EXAM      ovarian mass, laparotomy          SOCIAL HISTORY:   Social History     Social History     Marital status: Single     Spouse name: N/A     Number of children: N/A     Years of education: N/A     Social History Main Topics     Smoking status: Former Smoker     Types: Cigarettes     Smokeless tobacco: Never Used      Comment: tried to quit yes, passive exposure no     Alcohol use No     Drug use: No     Sexual activity: Not Asked     Other Topics Concern     Caffeine Concern Yes     coffee 2 cups      Parent/Sibling W/ Cabg, Mi Or Angioplasty Before 65f 55m? No     Social History Narrative          CURRENT MEDICATIONS:   Current Outpatient Prescriptions   Medication     aspirin 81 MG tablet     Acetaminophen (TYLENOL PO)     No current facility-administered medications for this visit.           ALLERGIES:   Allergies   Allergen Reactions     Alendronate Sodium      Hot tolerance  Fosamax       Nitrofurantoin      Chill  Fever  Chest pain  Macrobid       Nitrofurantoin Macrocrystal      Chills   Fever  Chest pain  Macrobid       Propoxyphene Napsylate      Darvocet-N 100       Sulfamethoxazole Rash     Bactrim DS     Trimethoprim Rash     Bactrim DS            ROS:   CONSTITUTIONAL: No weight loss, fever, chills, weakness or fatigue.   HEENT: Eyes: No visual changes. Ears, Nose, Throat: No hearing loss, congestion or difficulty swallowing.   CARDIOVASCULAR: (+) chest pain, but denies chest pressure or chest discomfort. No palpitations or lower extremity edema.   RESPIRATORY: No shortness of breath, dyspnea upon exertion, cough or sputum production.   GASTROINTESTINAL: No abdominal pain. No anorexia, nausea, vomiting or diarrhea.   NEUROLOGICAL: No headache, lightheadedness, dizziness,  syncope, ataxia or weakness.   HEMATOLOGIC: No anemia, bleeding or bruising.   PSYCHIATRIC: No history of depression or anxiety.   ENDOCRINOLOGIC: No reports of sweating, cold or heat intolerance. No polyuria or polydipsia.   SKIN: No abnormal rashes or itching.       PHYSICAL EXAM:   GENERAL: The patient is a well-developed, well-nourished, in no apparent distress. Alert and oriented x3.   HEENT: Head is normocephalic and atraumatic.   HEART: Regular rate and rhythm, S1S2 present without murmur, rub or gallop.   LUNGS: Respirations regular and unlabored. Clear to auscultation.   GI: Abdomen is soft and non distended.  EXTREMITIES: No peripheral edema present.   MUSCULOSKELETAL: No joint swelling.   NEUROLOGIC: Alert and oriented X3.   SKIN: No jaundice. No rashes or visible skin lesions present.       LAB RESULTS:   No visits with results within 6 Month(s) from this visit.  Latest known visit with results is:    Office Visit on 10/31/2016   Component Date Value Ref Range Status     WBC 10/31/2016 11.2* 4.0 - 11.0 10e9/L Final     RBC Count 10/31/2016 4.24  3.8 - 5.2 10e12/L Final     Hemoglobin 10/31/2016 12.6  11.7 - 15.7 g/dL Final     Hematocrit 10/31/2016 38.4  35.0 - 47.0 % Final     MCV 10/31/2016 91  78 - 100 fl Final     MCH 10/31/2016 29.7  26.5 - 33.0 pg Final     MCHC 10/31/2016 32.8  31.5 - 36.5 g/dL Final     RDW 10/31/2016 13.2  10.0 - 15.0 % Final     Platelet Count 10/31/2016 261  150 - 450 10e9/L Final     Diff Method 10/31/2016 Automated Method   Final     % Neutrophils 10/31/2016 71.0  % Final     % Lymphocytes 10/31/2016 17.2  % Final     % Monocytes 10/31/2016 8.5  % Final     % Eosinophils 10/31/2016 2.6  % Final     % Basophils 10/31/2016 0.3  % Final     % Immature Granulocytes 10/31/2016 0.4  % Final     Nucleated RBCs 10/31/2016 0  0 /100 Final     Absolute Neutrophil 10/31/2016 8.0  1.6 - 8.3 10e9/L Final     Absolute Lymphocytes 10/31/2016 1.9  0.8 - 5.3 10e9/L Final     Absolute  Monocytes 10/31/2016 1.0  0.0 - 1.3 10e9/L Final     Absolute Eosinophils 10/31/2016 0.3  0.0 - 0.7 10e9/L Final     Absolute Basophils 10/31/2016 0.0  0.0 - 0.2 10e9/L Final     Abs Immature Granulocytes 10/31/2016 0.1  0 - 0.4 10e9/L Final     Absolute Nucleated RBC 10/31/2016 0.0   Final     Sodium 10/31/2016 137  133 - 144 mmol/L Final     Potassium 10/31/2016 3.9  3.4 - 5.3 mmol/L Final     Chloride 10/31/2016 103  94 - 109 mmol/L Final     Carbon Dioxide 10/31/2016 28  20 - 32 mmol/L Final     Anion Gap 10/31/2016 6  3 - 14 mmol/L Final     Glucose 10/31/2016 87  70 - 99 mg/dL Final     Urea Nitrogen 10/31/2016 10  7 - 30 mg/dL Final     Creatinine 10/31/2016 0.79  0.52 - 1.04 mg/dL Final     GFR Estimate 10/31/2016 69  >60 mL/min/1.7m2 Final     GFR Estimate If Black 10/31/2016 83  >60 mL/min/1.7m2 Final     Calcium 10/31/2016 9.0  8.5 - 10.1 mg/dL Final     Bilirubin Total 10/31/2016 0.4  0.2 - 1.3 mg/dL Final     Albumin 10/31/2016 3.5  3.4 - 5.0 g/dL Final     Protein Total 10/31/2016 8.1  6.8 - 8.8 g/dL Final     Alkaline Phosphatase 10/31/2016 96  40 - 150 U/L Final     ALT 10/31/2016 17  0 - 50 U/L Final     AST 10/31/2016 13  0 - 45 U/L Final     TSH 10/31/2016 2.54  0.40 - 4.00 mU/L Final     Vitamin B12 10/31/2016 730  193 - 986 pg/mL Final     Folate 10/31/2016 39.9  >5.4 ng/mL Final     Cholesterol 10/31/2016 148  <200 mg/dL Final     Triglycerides 10/31/2016 147  <150 mg/dL Final     HDL Cholesterol 10/31/2016 46* >49 mg/dL Final     LDL Cholesterol Calculated 10/31/2016 73  <100 mg/dL Final     Non HDL Cholesterol 10/31/2016 102  <130 mg/dL Final          ASSESSMENT:   Mrs Banerjee is being seen with concerns for an abnormal stress test. Was seen in the ER on 11/15/17.  She has limited past medical history.      PLAN:   1. Suggested a CTA versus cath to fully identify any possible blockages to her heart. Pt was not interested in additional testing at this time.   2.  Also, discussed medical  management. If symptoms reoccur, should consider stating, BB, SL nitro, and +/- Imdur.   3.  Pt to be evaluated if with recurrence of symptoms.   4. No additional changes or testing today.       Thank you for allowing me to participate in the care of your patient. Please do not hesitate to contact me if you have any questions.     Ryne Rodriguez

## 2017-12-14 ASSESSMENT — ANXIETY QUESTIONNAIRES: GAD7 TOTAL SCORE: 1

## 2018-01-30 ENCOUNTER — OFFICE VISIT (OUTPATIENT)
Dept: FAMILY MEDICINE | Facility: OTHER | Age: 83
End: 2018-01-30
Attending: FAMILY MEDICINE
Payer: COMMERCIAL

## 2018-01-30 VITALS
DIASTOLIC BLOOD PRESSURE: 68 MMHG | OXYGEN SATURATION: 98 % | HEIGHT: 62 IN | SYSTOLIC BLOOD PRESSURE: 142 MMHG | HEART RATE: 74 BPM | RESPIRATION RATE: 20 BRPM

## 2018-01-30 DIAGNOSIS — R33.9 INCOMPLETE BLADDER EMPTYING: ICD-10-CM

## 2018-01-30 DIAGNOSIS — S46.211A BICEPS MUSCLE TEAR, RIGHT, INITIAL ENCOUNTER: Primary | ICD-10-CM

## 2018-01-30 DIAGNOSIS — B35.1 TINEA UNGUIUM: ICD-10-CM

## 2018-01-30 DIAGNOSIS — L98.9 SKIN LESION: ICD-10-CM

## 2018-01-30 PROCEDURE — G0463 HOSPITAL OUTPT CLINIC VISIT: HCPCS

## 2018-01-30 PROCEDURE — 99214 OFFICE O/P EST MOD 30 MIN: CPT | Performed by: FAMILY MEDICINE

## 2018-01-30 ASSESSMENT — PAIN SCALES - GENERAL: PAINLEVEL: MILD PAIN (2)

## 2018-01-30 NOTE — NURSING NOTE
"Chief Complaint   Patient presents with     Musculoskeletal Problem     right shoulder pain radiates down her arm, noticed bruising on upper arm since last week, has improved slightly but still there with no injury.       Initial /68  Pulse 74  Resp 20  Ht 5' 2\" (1.575 m)  SpO2 98%  Breastfeeding? No Estimated body mass index is 34.75 kg/(m^2) as calculated from the following:    Height as of 12/13/17: 5' 2\" (1.575 m).    Weight as of 12/13/17: 190 lb (86.2 kg).  Medication Reconciliation: complete   Erendira Sol      "

## 2018-01-31 NOTE — PROGRESS NOTES
[facility]    Norma Banerjee, 88 year old, female presents with   Chief Complaint   Patient presents with     Musculoskeletal Problem     right shoulder pain radiates down her arm, noticed bruising on upper arm since last week, has improved slightly but still there with no injury.She is accompanied by her mother today     great toe     nail is thickened, previous black area over the volar aspect of hte base of the toe. No pain. No injury     Bladder Problems     she has issues completely emptying her bladder usually when she gets up at night.      Derm Problem     lesion of chin continues to scab over and isn't healling over the last year       PAST MEDICAL HISTORY:  Past Medical History:   Diagnosis Date     Carpal tunnel syndrome 02/14/2011     Dermoid cyst of ovary 03/05/2008    removed     Gout 09/26/2011     Osteoarthritis 10/01/2012     Recurrent UTI 09/26/2011     Urinary incontinence 11/16/2006     Weakness of both legs 09/26/2011       PAST SURGICAL HISTORY:  Past Surgical History:   Procedure Laterality Date     ARTHROSCOPY KNEE      Osteoarthritis, left     C THALLIUM STRESS TEST  2007    negative persantine thallium GXT, Chest pain     CATARACT IOL, RT/LT      bilateral cataract surgery     CHOLECYSTECTOMY      Cholecystitis     COLONOSCOPY  4/2008    free of disease     HC ECP WITH CATARACT SURGERY       JOINT REPLACEMENT      Osteoarthritis, bilateral     RELEASE CARPAL TUNNEL  2011    RT     SURGICAL PATHOLOGY EXAM      ovarian mass, laparotomy       SOCIAL HISTORY  Social History     Social History     Marital status: Single     Spouse name: N/A     Number of children: N/A     Years of education: N/A     Occupational History     Not on file.     Social History Main Topics     Smoking status: Former Smoker     Types: Cigarettes     Smokeless tobacco: Never Used      Comment: tried to quit yes, passive exposure no     Alcohol use No     Drug use: No     Sexual activity: Not on  "file     Other Topics Concern     Caffeine Concern Yes     coffee 2 cups      Parent/Sibling W/ Cabg, Mi Or Angioplasty Before 65f 55m? No     Social History Narrative       MEDICATIONS:  Prior to Admission medications    Medication Sig Start Date End Date Taking? Authorizing Provider   aspirin 81 MG tablet Take 1 tablet (81 mg) by mouth daily 11/15/17  Yes Darvin Cortez PA-C   Acetaminophen (TYLENOL PO) Take by mouth At Bedtime   Yes Reported, Patient       ALLERGIES:     Allergies   Allergen Reactions     Alendronate Sodium      Hot tolerance  Fosamax       Nitrofurantoin      Chill  Fever  Chest pain  Macrobid       Nitrofurantoin Macrocrystal      Chills   Fever  Chest pain  Macrobid       Propoxyphene Napsylate      Darvocet-N 100       Sulfamethoxazole Rash     Bactrim DS     Trimethoprim Rash     Bactrim DS         ROS:  CONSTITUTIONAL:NEGATIVE for fever, chills, change in weight  RESP:NEGATIVE for significant cough or SOB  CV: NEGATIVE for chest pain, palpitations or peripheral edema  NEURO: NEGATIVE for weakness, dizziness or paresthesias  PSYCHIATRIC: NEGATIVE for changes in mood or affect    EXAM:  /68  Pulse 74  Resp 20  Ht 5' 2\" (1.575 m)  SpO2 98%  Breastfeeding? No There is no height or weight on file to calculate BMI.   GENERAL APPEARANCE: healthy, alert and no distress  RESP: lungs clear to auscultation - no rales, rhonchi or wheezes  CV: regular rates and rhythm, normal S1 S2, no S3 or S4 and no murmur, click or rub  MS: extremities normal- no gross deformities noted and tender to palpation at the distal biceps insertion  SKIN: purple ecchymosis over the right upper arm in a band like pattern 5 cm wide. Scabbed 4 mm lesion of the chin, thickening of the left great toenail, DP pulse, left is 4/4, capillary refill 1 second  NEURO: Normal strength and tone, mentation intact and speech normal  PSYCH: mentation appears normal and affect normal/bright    LABS AND IMAGING:         "   ASSESSMENT/PLAN:  (S46.211A) Biceps muscle tear, right, initial encounter  (primary encounter diagnosis)  Comment:   Plan: discussed, strength is intact, would not recommend surgery for this. She has had a previous tear as well in the past    (L98.9) Skin lesion  Comment:   Plan: OTOLARYNGOLOGY REFERRAL        Will refer for removal    (R33.9) Incomplete bladder emptying  Comment:   Plan: discussed physiology, medication most likely will not be helpful. She has decided to follow    (B35.1) Tinea unguium  Comment:   Plan: left great toe.discussed trying Tea Tree Oil for this, 60%successful      Carol Foreman MD  January 30, 2018

## 2018-03-09 ENCOUNTER — OFFICE VISIT (OUTPATIENT)
Dept: OTOLARYNGOLOGY | Facility: OTHER | Age: 83
End: 2018-03-09
Attending: FAMILY MEDICINE
Payer: MEDICARE

## 2018-03-09 VITALS
TEMPERATURE: 97.6 F | HEART RATE: 64 BPM | RESPIRATION RATE: 20 BRPM | OXYGEN SATURATION: 99 % | HEIGHT: 62 IN | SYSTOLIC BLOOD PRESSURE: 130 MMHG | DIASTOLIC BLOOD PRESSURE: 84 MMHG | BODY MASS INDEX: 34.96 KG/M2 | WEIGHT: 190 LBS

## 2018-03-09 DIAGNOSIS — L98.9 SKIN LESION: ICD-10-CM

## 2018-03-09 PROCEDURE — G0463 HOSPITAL OUTPT CLINIC VISIT: HCPCS | Mod: 25

## 2018-03-09 PROCEDURE — 11442 EXC FACE-MM B9+MARG 1.1-2 CM: CPT | Performed by: OTOLARYNGOLOGY

## 2018-03-09 PROCEDURE — 99203 OFFICE O/P NEW LOW 30 MIN: CPT | Mod: 25 | Performed by: OTOLARYNGOLOGY

## 2018-03-09 PROCEDURE — 88305 TISSUE EXAM BY PATHOLOGIST: CPT | Mod: TC | Performed by: OTOLARYNGOLOGY

## 2018-03-09 ASSESSMENT — PAIN SCALES - GENERAL: PAINLEVEL: NO PAIN (0)

## 2018-03-09 NOTE — PATIENT INSTRUCTIONS
Thank you for allowing Dr. Billings and our ENT team to participate in your care.  If your medications are too expensive, please give the nurse a call.  We can possibly change this medication.  If you have a scheduling or an appointment question please contact [facility] our [facility] Coordinator at their direct line [phone].   ALL nursing questions or concerns can be directed to your ENT nurse at: [phone] - [facility]      POST PROCEDURE INSTRUCTIONS      Remove your dressing in 24 hours (If you have one)    Wash incision with a mixture of half water and half hydrogen peroxide 3 times daily.    Apply Aquaphor Healing Ointment to the wound 3 times daily. (Unless specified Bacitracin)    Cover with a clean dressing if in a dirty constance environment or when wet/soiled    Keep incision clean and dry   Do NOT soak in water such as a tub bath or swimming   Do NOT put make-up, powders, hairspray, lotions, etc on the incision       You can apply ice to the surgical area to help reduce swelling. (no longer than 20 minutes at a time)      You can use acetaminophen(Tylenol) or the prescription you received for pain.       If you have any bleeding, cover the wound with clean gauze and hold pressure for 10 Minutes. If the bleeding does not stop or is heavy and profuse, call the clinic or go to the Urgent Care/Emergency Department.    SIGNS OF INFECTION ARE:    Redness, swelling, red streaks, pus, drainage, warmth, fever, increased pain, foul smell.     Contact your primary health care provider if you notice any of the warning signs.     FOLLOW - UP    Follow-up in clinic for a nurse only visit in 7 days for suture removal.     Pathology results will be called to you when they are back. Usually 7-10 days.      6 WEEKS POST PROCEDURE      Apply ANY type of lotion to the suture site(Example - Vaseline Intensive Care or Vitamin E)    Massage the surgical area 1-2 times daily in a circular motion for 5 minutes, for a period of 2  months. This will help the scar heal better.

## 2018-03-09 NOTE — PROGRESS NOTES
Otolaryngology Consultation    Patient: Norma Banerjee  : 1930    Patient presents with:  Consult: Skin lesion, referred by TESS Foreman     This patient presents today for a left lower lip facial skin lesion.  Present nearly 6 months, changing and growing.  She notes she scratches the lesion off and then it recurs it occasionally will spontaneously bleed.  No known history of carcinoma skin or melanoma.  She denies excess sun exposure with sun burns throughout life. No immunodeficiency.  No tobacco use  No prior history of skin cancers        Current Outpatient Rx   Medication Sig Dispense Refill     aspirin 81 MG tablet Take 1 tablet (81 mg) by mouth daily  OTC     Acetaminophen (TYLENOL PO) Take by mouth At Bedtime         Allergies: Alendronate sodium; Nitrofurantoin; Nitrofurantoin macrocrystal; Propoxyphene napsylate; Sulfamethoxazole; and Trimethoprim     Past Medical History:   Diagnosis Date     Carpal tunnel syndrome 2011     Dermoid cyst of ovary 2008    removed     Gout 2011     Osteoarthritis 10/01/2012     Recurrent UTI 2011     Urinary incontinence 2006     Weakness of both legs 2011       Past Surgical History:   Procedure Laterality Date     ARTHROSCOPY KNEE      Osteoarthritis, left     C THALLIUM STRESS TEST  2007    negative persantine thallium GXT, Chest pain     CATARACT IOL, RT/LT      bilateral cataract surgery     CHOLECYSTECTOMY      Cholecystitis     COLONOSCOPY  2008    free of disease     HC ECP WITH CATARACT SURGERY       JOINT REPLACEMENT      Osteoarthritis, bilateral     RELEASE CARPAL TUNNEL      RT     SURGICAL PATHOLOGY EXAM      ovarian mass, laparotomy       ENT family history reviewed    Social History   Substance Use Topics     Smoking status: Former Smoker     Types: Cigarettes     Smokeless tobacco: Never Used      Comment: tried to quit yes, passive exposure no     Alcohol use No       Review of Systems  ROS: 10 point ROS  "neg other than the symptoms noted above in the HPI and hair loss eye irritation feet swelling    Physical Exam  /84 (BP Location: Left arm, Patient Position: Chair, Cuff Size: Adult Large)  Pulse 64  Temp 97.6  F (36.4  C) (Tympanic)  Resp 20  Ht 5' 2\" (1.575 m)  Wt 190 lb (86.2 kg)  SpO2 99%  BMI 34.75 kg/m2  General - The patient is well nourished and well developed, and appears to have good nutritional status.  Alert and oriented to person and place, answers questions and cooperates with examination appropriately.   Head and Face - Normocephalic and atraumatic, with no gross asymmetry noted.  The facial nerve is intact, with strong symmetric movements.  Skin-there is a left lower lip skin lesion which is raised and irregular superficially ulcerated 0.6 cm this is  just below the vermilion line  This does not involve the mucosal lip bimanual palpation is negative for any deep mass within the lower lip  Voice and Breathing - The patient was breathing comfortably without the use of accessory muscles. There was no wheezing, stridor, or stertor.  The patients voice was clear and strong, and had appropriate pitch and quality..  Eyes - Extraocular movements intact, and the pupils were reactive to light.  Sclera were not icteric or injected, conjunctiva were pink and moist.    Neck - Normal midline excursion of the laryngotracheal complex during swallowing.  Full range of motion on passive movement.  Palpation of the occipital, submental, submandibular, internal jugular chain, and supraclavicular nodes did not demonstrate any abnormal lymph nodes or masses.  Palpation of the thyroid was soft and smooth, with no nodules or goiter appreciated.  The trachea was mobile and midline.      Office Procedure:   I discussed the risks and complications of skin lesion excision, including local anesthesia, bleeding, infection, injury to major/minor arteries, nerves and veins, scar formation, hypertrophic healing or " keloid, numbness to area, recurrence of lesion, benign versus malignant pathology and possible need for further surgery. All questions were answered.    After informed consent was discussed and a time- out taken, I proceeded to cleanse the skin around the lesion with alcohol.  I then demarcated the lesion parallel to relaxed skin tension lines in a natural crease.  The area was prepped and draped in the normal sterile fashion.  I then demarcated the lower lip vermilion and infiltrated the skin with 1% lidocaine with 1:200,000 epinephrine.  I  used a 15-blade to create an elliptical incision around the lesion, with margins of 2 mm of grossly normal skin.  I then elevated the skin ellipse and a thin cuff of underlying subdermal fat with curved iris scissors.  I dissected down through normal subcutaneous tissue and down to orbicularis for complete removal of the lesion.  After the lesion was completely removed, I then placed it in formalin for permanent section.  Hemostasis was achieved with direct pressure and hand held cautery. I then irrigated the wound and gently suctioned the area.  I advanced the adjacent skin for tension free closure using tenotomy scissors.  The total length of the incision was  1.2 cm.  I then proceeded to close the incision by using simple interrupted buried knot sutures, using 5-0 Vicryl.  The skin edges were then reapproximated using 5-0 nylon, simple interrupted sutures.  Antibiotic ointment was then applied and the wound was dressed.  The vermilion was preserved throughout, and the patient tolerated the procedure without any difficulty.    A/P  This patient had a skin lesion excised today.  I have instructed the patient on wound care and signs of infection.  Written instructions provided.  We will contact the patient with pathology results.    If this is a squamous cell carcinoma she will need a CAT scan of the neck and depending on the pathology results she may need to proceed with  excision of the right lower lip lesion with frozens and flap repair in the operating room under MAC with local this is discussed today including the risk of anesthesia bleeding infection flap failure    Would like photos taken when she follows up for suture removal in a week if we do need to go to surgery    If any additional surgery needs to be scheduled we will discuss this after pathology results are finalized.     No soaking of the wound for 3 weeks, may shower.     Follow up with LPN in 1 week for suture removal, or as otherwise indicated.      If you prefer, you may remove your own nylon sutures in 1 week per our instructions, followed by careful application of benzoin and steri strips.        Sunscreen use and skin cancer preventive measures discussed     Impression and Plan- Norma Banerjee is a 88 year old female with:    ICD-10-CM    1. Skin lesion L98.9 EXC BENIGN SKIN LESION FACE/EARS 1.1-2.0 CM             Maegan Billings D.O.  Otolaryngology/Head and Neck Surgery  Allergy

## 2018-03-09 NOTE — LETTER
3/9/2018         RE: Norma Banerjee  [address]  [place] [address]        Dear Colleague,    Thank you for referring your patient, Norma Banerjee, to the [facility] [place]. Please see a copy of my visit note below.    Otolaryngology Consultation    Patient: Norma Banerjee  : 1930    Patient presents with:  Consult: Skin lesion, referred by TESS Foreman     This patient presents today for a left lower lip facial skin lesion.  Present nearly 6 months, changing and growing.  She notes she scratches the lesion off and then it recurs it occasionally will spontaneously bleed.  No known history of carcinoma skin or melanoma.  She denies excess sun exposure with sun burns throughout life. No immunodeficiency.  No tobacco use  No prior history of skin cancers        Current Outpatient Rx   Medication Sig Dispense Refill     aspirin 81 MG tablet Take 1 tablet (81 mg) by mouth daily  OTC     Acetaminophen (TYLENOL PO) Take by mouth At Bedtime         Allergies: Alendronate sodium; Nitrofurantoin; Nitrofurantoin macrocrystal; Propoxyphene napsylate; Sulfamethoxazole; and Trimethoprim     Past Medical History:   Diagnosis Date     Carpal tunnel syndrome 2011     Dermoid cyst of ovary 2008    removed     Gout 2011     Osteoarthritis 10/01/2012     Recurrent UTI 2011     Urinary incontinence 2006     Weakness of both legs 2011       Past Surgical History:   Procedure Laterality Date     ARTHROSCOPY KNEE      Osteoarthritis, left     C THALLIUM STRESS TEST      negative persantine thallium GXT, Chest pain     CATARACT IOL, RT/LT      bilateral cataract surgery     CHOLECYSTECTOMY      Cholecystitis     COLONOSCOPY  2008    free of disease     HC ECP WITH CATARACT SURGERY       JOINT REPLACEMENT      Osteoarthritis, bilateral     RELEASE CARPAL TUNNEL      RT     SURGICAL PATHOLOGY EXAM      ovarian mass, laparotomy       ENT family history  "reviewed    Social History   Substance Use Topics     Smoking status: Former Smoker     Types: Cigarettes     Smokeless tobacco: Never Used      Comment: tried to quit yes, passive exposure no     Alcohol use No       Review of Systems  ROS: 10 point ROS neg other than the symptoms noted above in the HPI and hair loss eye irritation feet swelling    Physical Exam  /84 (BP Location: Left arm, Patient Position: Chair, Cuff Size: Adult Large)  Pulse 64  Temp 97.6  F (36.4  C) (Tympanic)  Resp 20  Ht 5' 2\" (1.575 m)  Wt 190 lb (86.2 kg)  SpO2 99%  BMI 34.75 kg/m2  General - The patient is well nourished and well developed, and appears to have good nutritional status.  Alert and oriented to person and place, answers questions and cooperates with examination appropriately.   Head and Face - Normocephalic and atraumatic, with no gross asymmetry noted.  The facial nerve is intact, with strong symmetric movements.  Skin-there is a left lower lip skin lesion which is raised and irregular superficially ulcerated 0.6 cm this is  just below the vermilion line  This does not involve the mucosal lip bimanual palpation is negative for any deep mass within the lower lip  Voice and Breathing - The patient was breathing comfortably without the use of accessory muscles. There was no wheezing, stridor, or stertor.  The patients voice was clear and strong, and had appropriate pitch and quality..  Eyes - Extraocular movements intact, and the pupils were reactive to light.  Sclera were not icteric or injected, conjunctiva were pink and moist.    Neck - Normal midline excursion of the laryngotracheal complex during swallowing.  Full range of motion on passive movement.  Palpation of the occipital, submental, submandibular, internal jugular chain, and supraclavicular nodes did not demonstrate any abnormal lymph nodes or masses.  Palpation of the thyroid was soft and smooth, with no nodules or goiter appreciated.  The trachea was " mobile and midline.      Office Procedure:   I discussed the risks and complications of skin lesion excision, including local anesthesia, bleeding, infection, injury to major/minor arteries, nerves and veins, scar formation, hypertrophic healing or keloid, numbness to area, recurrence of lesion, benign versus malignant pathology and possible need for further surgery. All questions were answered.    After informed consent was discussed and a time- out taken, I proceeded to cleanse the skin around the lesion with alcohol.  I then demarcated the lesion parallel to relaxed skin tension lines in a natural crease.  The area was prepped and draped in the normal sterile fashion.  I then demarcated the lower lip vermilion and infiltrated the skin with 1% lidocaine with 1:200,000 epinephrine.  I  used a 15-blade to create an elliptical incision around the lesion, with margins of 2 mm of grossly normal skin.  I then elevated the skin ellipse and a thin cuff of underlying subdermal fat with curved iris scissors.  I dissected down through normal subcutaneous tissue and down to orbicularis for complete removal of the lesion.  After the lesion was completely removed, I then placed it in formalin for permanent section.  Hemostasis was achieved with direct pressure and hand held cautery. I then irrigated the wound and gently suctioned the area.  I advanced the adjacent skin for tension free closure using tenotomy scissors.  The total length of the incision was  1.2 cm.  I then proceeded to close the incision by using simple interrupted buried knot sutures, using 5-0 Vicryl.  The skin edges were then reapproximated using 5-0 nylon, simple interrupted sutures.  Antibiotic ointment was then applied and the wound was dressed.  The vermilion was preserved throughout, and the patient tolerated the procedure without any difficulty.    A/P  This patient had a skin lesion excised today.  I have instructed the patient on wound care and signs  of infection.  Written instructions provided.  We will contact the patient with pathology results.    If this is a squamous cell carcinoma she will need a CAT scan of the neck and depending on the pathology results she may need to proceed with excision of the right lower lip lesion with frozens and flap repair in the operating room under MAC with local this is discussed today including the risk of anesthesia bleeding infection flap failure    Would like photos taken when she follows up for suture removal in a week if we do need to go to surgery    If any additional surgery needs to be scheduled we will discuss this after pathology results are finalized.     No soaking of the wound for 3 weeks, may shower.     Follow up with LPN in 1 week for suture removal, or as otherwise indicated.      If you prefer, you may remove your own nylon sutures in 1 week per our instructions, followed by careful application of benzoin and steri strips.        Sunscreen use and skin cancer preventive measures discussed     Impression and Plan- Norma Banerjee is a 88 year old female with:    ICD-10-CM    1. Skin lesion L98.9 EXC BENIGN SKIN LESION FACE/EARS 1.1-2.0 CM             Maegan Billings D.O.  Otolaryngology/Head and Neck Surgery  Allergy      Again, thank you for allowing me to participate in the care of your patient.        Sincerely,        Maegan Billings MD

## 2018-03-09 NOTE — MR AVS SNAPSHOT
After Visit Summary   3/9/2018    Norma Banerjee    MRN: [id number]           Patient Information     Date Of Birth          1/1/1930        Visit Information        Provider Department      3/9/2018 10:30 AM Maegan Billings MD [facility]        Today's Diagnoses     Skin lesion          Care Instructions    Thank you for allowing Dr. Billings and our ENT team to participate in your care.  If your medications are too expensive, please give the nurse a call.  We can possibly change this medication.  If you have a scheduling or an appointment question please contact [facility] Unit Coordinator at their direct line [phone].   ALL nursing questions or concerns can be directed to your ENT nurse at: [phone] - Lela      POST PROCEDURE INSTRUCTIONS      Remove your dressing in 24 hours (If you have one)    Wash incision with a mixture of half water and half hydrogen peroxide 3 times daily.    Apply Aquaphor Healing Ointment to the wound 3 times daily. (Unless specified Bacitracin)    Cover with a clean dressing if in a dirty constance environment or when wet/soiled    Keep incision clean and dry   Do NOT soak in water such as a tub bath or swimming   Do NOT put make-up, powders, hairspray, lotions, etc on the incision       You can apply ice to the surgical area to help reduce swelling. (no longer than 20 minutes at a time)      You can use acetaminophen(Tylenol) or the prescription you received for pain.       If you have any bleeding, cover the wound with clean gauze and hold pressure for 10 Minutes. If the bleeding does not stop or is heavy and profuse, call the clinic or go to the Urgent Care/Emergency Department.    SIGNS OF INFECTION ARE:    Redness, swelling, red streaks, pus, drainage, warmth, fever, increased pain, foul smell.     Contact your primary health care provider if you notice any of the warning signs.     FOLLOW - UP    Follow-up in clinic for a nurse  "only visit in 7 days for suture removal.     Pathology results will be called to you when they are back. Usually 7-10 days.      6 WEEKS POST PROCEDURE      Apply ANY type of lotion to the suture site(Example - Vaseline Intensive Care or Vitamin E)    Massage the surgical area 1-2 times daily in a circular motion for 5 minutes, for a period of 2 months. This will help the scar heal better.             Follow-ups after your visit        Who to contact     If you have questions or need follow up information about today's clinic visit or your schedule please contact [facility] directly at [phone].  Normal or non-critical lab and imaging results will be communicated to you by MyChart, letter or phone within 4 business days after the clinic has received the results. If you do not hear from us within 7 days, please contact the clinic through [employer] or phone. If you have a critical or abnormal lab result, we will notify you by phone as soon as possible.  Submit refill requests through Company or call your pharmacy and they will forward the refill request to us. Please allow 3 business days for your refill to be completed.          Additional Information About Your Visit        MyChart Information     Company gives you secure access to your electronic health record. If you see a primary care provider, you can also send messages to your care team and make appointments. If you have questions, please call your primary care clinic.  If you do not have a primary care provider, please call [phone] and they will assist you.        Care EveryWhere ID     This is your Care EveryWhere ID. This could be used by other organizations to access your [facility] medical records  [id number]        Your Vitals Were     Pulse Temperature Respirations Height Pulse Oximetry BMI (Body Mass Index)    64 97.6  F (36.4  C) (Tympanic) 20 5' 2\" (1.575 m) 99% 34.75 kg/m2       Blood Pressure from Last 3 Encounters:   03/09/18 " 130/84   01/30/18 142/68   12/13/17 163/60    Weight from Last 3 Encounters:   03/09/18 190 lb (86.2 kg)   12/13/17 190 lb (86.2 kg)   11/30/17 187 lb (84.8 kg)              Today, you had the following     No orders found for display       Primary Care Provider Office Phone # Fax #    Carol Foreman [phone]       [facility] [place] [id number]        Equal Access to Services     ROSEANN JAMIL : Hadii aad ku hadasho Soomaali, waaxda luqadaha, qaybta kaalmada adeegyada, nii rivera hayyudith veronica . So [facility] [phone].    ATENCIÓN: Si habla español, tiene a oliva disposición servicios gratuitos de asistencia lingüística. Llame al [phone].    We comply with applicable federal civil rights laws and [place] laws. We do not discriminate on the basis of race, color, national origin, age, disability, sex, sexual orientation, or gender identity.            Thank you!     Thank you for choosing [facility]  for your care. Our goal is always to provide you with excellent care. Hearing back from our patients is one way we can continue to improve our services. Please take a few minutes to complete the written survey that you may receive in the mail after your visit with us. Thank you!             Your Updated Medication List - Protect others around you: Learn how to safely use, store and throw away your medicines at www.disposemymeds.org.          This list is accurate as of 3/9/18 11:08 AM.  Always use your most recent med list.                   Brand Name Dispense Instructions for use Diagnosis    aspirin 81 MG tablet      Take 1 tablet (81 mg) by mouth daily        TYLENOL PO      Take by mouth At Bedtime

## 2018-03-09 NOTE — NURSING NOTE
"Chief Complaint   Patient presents with     Consult     Skin lesion, referred by TESS Foreman        Initial /84 (BP Location: Left arm, Patient Position: Chair, Cuff Size: Adult Large)  Pulse 64  Temp 97.6  F (36.4  C) (Tympanic)  Resp 20  Ht 5' 2\" (1.575 m)  Wt 190 lb (86.2 kg)  SpO2 99%  BMI 34.75 kg/m2 Estimated body mass index is 34.75 kg/(m^2) as calculated from the following:    Height as of this encounter: 5' 2\" (1.575 m).    Weight as of this encounter: 190 lb (86.2 kg).  Medication Reconciliation: lisset Cortés      "

## 2018-03-11 ENCOUNTER — HOSPITAL ENCOUNTER (EMERGENCY)
Facility: HOSPITAL | Age: 83
Discharge: HOME | End: 2018-03-11
Attending: NURSE PRACTITIONER | Admitting: NURSE PRACTITIONER
Payer: MEDICARE

## 2018-03-11 VITALS
DIASTOLIC BLOOD PRESSURE: 60 MMHG | TEMPERATURE: 97 F | SYSTOLIC BLOOD PRESSURE: 140 MMHG | RESPIRATION RATE: 14 BRPM | HEART RATE: 67 BPM | OXYGEN SATURATION: 98 %

## 2018-03-11 DIAGNOSIS — Z51.89 VISIT FOR WOUND CHECK: ICD-10-CM

## 2018-03-11 PROCEDURE — 99024 POSTOP FOLLOW-UP VISIT: CPT | Performed by: NURSE PRACTITIONER

## 2018-03-11 PROCEDURE — 40000268 ZZH STATISTIC NO CHARGES

## 2018-03-11 NOTE — ED AVS SNAPSHOT
[facility]    [address]    [place] [facility] [place] [address]    Phone:  [phone]                                       Norma Banerjee   MRN: [id number]    Department:  [facility]   Date of Visit:  3/11/2018           Patient Information     Date Of Birth          1/1/1930        Your diagnoses for this visit were:     Visit for wound check        You were seen by Eileen Schilling NP.      Follow-up Information     Follow up with Carol Foreman MD.    Specialty:  Family Practice    Why:  As needed, If symptoms worsen    Contact information:    [address]  [place] [address] [phone]          Follow up with [facility].    Specialty:  EMERGENCY MEDICINE    Why:  As needed, If symptoms worsen    Contact information:    [address]  [place] [address] [phone]    Additional information:    From [place]: Take [address]. Turn left at [address]. Turn left at the first stoplight on [address]. At the first stop sign, take a right onto [address]. Take a left into the parking lot and continue through until you reach the North enterance of the building.       From [place]: Take [address]. Take the [address] ramp towards [place]. Turn left onto [address]. Take a slight right onto [address]. Turn left at the first stoplight on [address]. At the first stop sign, take a right onto [address]. Take a left into the parking lot and continue through until you reach the North enterance of the building.       From [place]: Take [address]. Take a right at [address]. At the first stop sign, take a right onto [address]. Take a left into the parking lot and continue through until you reach the North enterance of the building.         Discharge Instructions       You can apply ice to stitch area if swelling occurs. Protect skin.   Follow up with   [facility] as directed for stitch removal.   Return to urgent care or emergency department with any increase in symptoms or concerns.     Discharge References/Attachments     WOUND CHECK (NO INFECTION) (ENGLISH)         Review of your medicines      Our records show that you are taking the medicines listed below. If these are incorrect, please call your family doctor or clinic.        Dose / Directions Last dose taken    aspirin 81 MG tablet   Dose:  81 mg        Take 1 tablet (81 mg) by mouth daily   Refills:  OTC        TYLENOL PO        Take by mouth At Bedtime   Refills:  0                Orders Needing Specimen Collection     None      Pending Results     No orders found from 3/9/2018 to 3/12/2018.            Pending Culture Results     No orders found from 3/9/2018 to 3/12/2018.            Thank you for choosing [facility]       Thank you for choosing [facility] for your care. Our goal is always to provide you with excellent care. Hearing back from our patients is one way we can continue to improve our services. Please take a few minutes to complete the written survey that you may receive in the mail after you visit with us. Thank you!        [employer] Information     [employer] gives you secure access to your electronic health record. If you see a primary care provider, you can also send messages to your care team and make appointments. If you have questions, please call your primary care clinic.  If you do not have a primary care provider, please call [phone] and they will assist you.        Care EveryWhere ID     This is your Care EveryWhere ID. This could be used by other organizations to access your [facility] medical records  [id number]        Equal Access to Services     YOUNG JAMIL : Jake Saleh, mars thomas, qajayashree kaalnii coe. So [facility] [phone].    ATENCIÓN: Si habla español, tiene a oliva disposición servicios gratuitos de  asistencia lingüística. Fransisco al [phone].    We comply with applicable federal civil rights laws and [place] laws. We do not discriminate on the basis of race, color, national origin, age, disability, sex, sexual orientation, or gender identity.            After Visit Summary       This is your record. Keep this with you and show to your community pharmacist(s) and doctor(s) at your next visit.

## 2018-03-11 NOTE — ED NOTES
Pt presents today with complaints or redness and bottom lip swelling. Pt has stitches in left bottom lip, some redness and swelling noted.

## 2018-03-11 NOTE — ED PROVIDER NOTES
"  History     Chief Complaint   Patient presents with     Wound Check     The history is provided by the patient and a relative (Daughter). No  was used.     Norma Banerjee is a 88 year old female who presents for a wound check. She had a lesion excised from below her left lower lip on 3-9-18 by Dr. Billings. She bit her bottom lip 1 day ago and noted \"redness and puffiness\" to incision area. No interventions for symptoms. Denies fever, chills, or night sweats. Eating and drinking well. Bowel and bladder are working well.       Problem List:    Patient Active Problem List    Diagnosis Date Noted     Abnormal stress test 12/13/2017     Priority: Medium     Atypical chest pain 12/13/2017     Priority: Medium     ACP (advance care planning) 10/31/2016     Priority: Medium     Advance Care Planning 11/14/2016: Receipt of ACP document:  Received: POLST which was signed and dated by provider on 11/3/16.  Document previously scanned on 11/8/16.  Order reviewed and found to be valid.  Code Status reflects choices in most recent ACP document.  Confirmed/documented designated decision maker(s).  Added by Melba Luu  Advance Care Planning 10/31/2016: ACP Review of Chart / Resources Provided:  Reviewed chart for advance care plan.  Norma Banerjee has no plan or code status on file. Discussed available resources and provided with information. Confirmed code status reflects current choices pending further ACP discussions.  Confirmed/documented legally designated decision makers.  Added by Mariana Richards               Incomplete tear of right rotator cuff 10/31/2016     Priority: Medium     Osteoarthritis 10/01/2012     Priority: Medium     Problem list name updated by automated process. Provider to review       Urinary incontinence 11/16/2006     Priority: Medium     Problem list name updated by automated process. Provider to review          Past Medical History:    Past Medical History: "   Diagnosis Date     Carpal tunnel syndrome 02/14/2011     Dermoid cyst of ovary 03/05/2008     Gout 09/26/2011     Osteoarthritis 10/01/2012     Recurrent UTI 09/26/2011     Urinary incontinence 11/16/2006     Weakness of both legs 09/26/2011       Past Surgical History:    Past Surgical History:   Procedure Laterality Date     ARTHROSCOPY KNEE      Osteoarthritis, left     C THALLIUM STRESS TEST  2007    negative persantine thallium GXT, Chest pain     CATARACT IOL, RT/LT      bilateral cataract surgery     CHOLECYSTECTOMY      Cholecystitis     COLONOSCOPY  4/2008    free of disease     HC ECP WITH CATARACT SURGERY       JOINT REPLACEMENT      Osteoarthritis, bilateral     RELEASE CARPAL TUNNEL  2011    RT     SURGICAL PATHOLOGY EXAM      ovarian mass, laparotomy       Family History:    Family History   Problem Relation Age of Onset     Alzheimer Disease Mother 92     cause of death     CANCER Mother      uterine     C.A.D. Father      CEREBROVASCULAR DISEASE Father 72     cva, cause of death     DIABETES Sister      Hypertension Sister      Hypertension Sister      Hypertension Sister        Social History:  Marital Status:  Single [1]  Social History   Substance Use Topics     Smoking status: Former Smoker     Types: Cigarettes     Smokeless tobacco: Never Used      Comment: tried to quit yes, passive exposure no     Alcohol use No        Medications:      aspirin 81 MG tablet   Acetaminophen (TYLENOL PO)         Review of Systems   Constitutional: Negative for activity change, appetite change, chills and fever.   HENT: Negative for congestion and trouble swallowing.    Respiratory: Negative for cough.    Gastrointestinal: Negative for diarrhea and vomiting.   Genitourinary: Negative for dysuria.   Skin: Positive for wound.        Incision from skin lesion removal.    Neurological: Negative for weakness.   Psychiatric/Behavioral: Negative.        Physical Exam   BP: 140/60  Pulse: 67  Temp: 97  F (36.1   C)  Resp: 14  SpO2: 98 %      Physical Exam   Constitutional: She is oriented to person, place, and time. She appears well-developed and well-nourished. No distress.   HENT:   Head: Normocephalic.   Mouth/Throat: Oropharynx is clear and moist.   Neck: Normal range of motion. Neck supple.   Cardiovascular: Normal rate, regular rhythm and normal heart sounds.    No murmur heard.  Pulmonary/Chest: Effort normal. No respiratory distress.   Abdominal: Soft. She exhibits no distension.   Musculoskeletal: Normal range of motion.   Neurological: She is alert and oriented to person, place, and time.   Skin: Skin is warm and dry. No rash noted. She is not diaphoretic. No erythema.   Incision below left lower lip with sutures intact and well approximated. No drainage, erythema, or signs of wound dehiscence.    Psychiatric: She has a normal mood and affect. Her behavior is normal.   Nursing note and vitals reviewed.      ED Course     ED Course     Procedures      Assessments & Plan (with Medical Decision Making)     Reassured that incision area looks great without signs of infection. Encouraged to follow up as directed per Dr. Billings for suture removal. She verbalized understanding.     Discussed plan of care. She verbalized understanding. All questions answered.     I have reviewed the nursing notes.    I have reviewed the findings, diagnosis, plan and need for follow up with the patient.  Discharged in stable condition.     New Prescriptions    No medications on file       Final diagnoses:   Visit for wound check     You can apply ice to stitch area if swelling occurs. Protect skin.   Follow up with Dr. Billings as directed for stitch removal.   Return to urgent care or emergency department with any increase in symptoms or concerns.     DAPHNE Cabrera  3/11/2018  11:05 AM  URGENT CARE CLINIC       Eileen Schilling NP  03/15/18 [phone]

## 2018-03-11 NOTE — DISCHARGE INSTRUCTIONS
You can apply ice to stitch area if swelling occurs. Protect skin.   Follow up with Dr. Billings as directed for stitch removal.   Return to urgent care or emergency department with any increase in symptoms or concerns.

## 2018-03-11 NOTE — ED AVS SNAPSHOT
[facility]    [address] [address]    Phone:  [phone]                                       Norma Banerjee   MRN: [id number]    Department:  [facility]   Date of Visit:  3/11/2018           After Visit Summary Signature Page     I have received my discharge instructions, and my questions have been answered. I have discussed any challenges I see with this plan with the nurse or doctor.    ..........................................................................................................................................  Patient/Patient Representative Signature      ..........................................................................................................................................  Patient Representative Print Name and Relationship to Patient    ..................................................               ................................................  Date                                            Time    ..........................................................................................................................................  Reviewed by Signature/Title    ...................................................              ..............................................  Date                                                            Time

## 2018-03-13 LAB — COPATH REPORT: NORMAL

## 2018-03-15 ASSESSMENT — ENCOUNTER SYMPTOMS
DYSURIA: 0
APPETITE CHANGE: 0
DIARRHEA: 0
TROUBLE SWALLOWING: 0
CHILLS: 0
PSYCHIATRIC NEGATIVE: 1
ACTIVITY CHANGE: 0
FEVER: 0
COUGH: 0
WOUND: 1
VOMITING: 0
WEAKNESS: 0

## 2018-03-16 ENCOUNTER — ALLIED HEALTH/NURSE VISIT (OUTPATIENT)
Dept: OTOLARYNGOLOGY | Facility: OTHER | Age: 83
End: 2018-03-16
Attending: OTOLARYNGOLOGY
Payer: MEDICARE

## 2018-03-16 DIAGNOSIS — Z48.02 ENCOUNTER FOR REMOVAL OF SUTURES: Primary | ICD-10-CM

## 2018-03-16 NOTE — MR AVS SNAPSHOT
After Visit Summary   3/16/2018    Norma Banerjee    MRN: [id number]           Patient Information     Date Of Birth          1/1/1930        Visit Information        Provider Department      3/16/2018 10:45 AM [facility] ENT NURSE Chante Perez        Today's Diagnoses     Encounter for removal of sutures    -  1       Follow-ups after your visit        Your next 10 appointments already scheduled     Mar 20, 2018  9:30 AM CDT   (Arrive by 9:15 AM)   Pre-Op physical with Carol Foreman MD   [facility] [place] ([facility] [place] )    [address]  [place] [address]   [phone]            Apr 18, 2018  9:45 AM CDT   (Arrive by 9:30 AM)   Post Op with Renetta Hayes PA-C   [facility] [place] ([facility] [place] )    [address]  [place] [address]   [phone]              Who to contact     If you have questions or need follow up information about today's clinic visit or your schedule please contact [facility] directly at [phone].  Normal or non-critical lab and imaging results will be communicated to you by [employer], letter or phone within 4 business days after the clinic has received the results. If you do not hear from us within 7 days, please contact the clinic through [employer] or phone. If you have a critical or abnormal lab result, we will notify you by phone as soon as possible.  Submit refill requests through [employer] or call your pharmacy and they will forward the refill request to us. Please allow 3 business days for your refill to be completed.          Additional Information About Your Visit        [employer] Information     [employer] gives you secure access to your electronic health record. If you see a primary care provider, you can also send messages to your care team and make appointments. If you have questions, please call your primary care clinic.  If you do not have a primary care provider, please call [phone] and  they will assist you.        Care EveryWhere ID     This is your Care EveryWhere ID. This could be used by other organizations to access your [facility] medical records  [id number]         Blood Pressure from Last 3 Encounters:   03/11/18 140/60   03/09/18 130/84   01/30/18 142/68    Weight from Last 3 Encounters:   03/09/18 190 lb (86.2 kg)   12/13/17 190 lb (86.2 kg)   11/30/17 187 lb (84.8 kg)              Today, you had the following     No orders found for display       Primary Care Provider Office Phone # Fax #    Carol Foreman [phone]       [address] [place] [address]        Equal Access to Services     YOUNG JAMIL : Haddarion adairo Sojuan josé, waaxda luqadaha, qaybta kaalmada adeegyada, nii veronica . So [facility] [phone].    ATENCIÓN: Si habla español, tiene a oliva disposición servicios gratuitos de asistencia lingüística. [facility] [phone].    We comply with applicable federal civil rights laws and [place] laws. We do not discriminate on the basis of race, color, national origin, age, disability, sex, sexual orientation, or gender identity.            Thank you!     Thank you for choosing [facility]  for your care. Our goal is always to provide you with excellent care. Hearing back from our patients is one way we can continue to improve our services. Please take a few minutes to complete the written survey that you may receive in the mail after your visit with us. Thank you!             Your Updated Medication List - Protect others around you: Learn how to safely use, store and throw away your medicines at www.disposemymeds.org.          This list is accurate as of 3/16/18 11:10 AM.  Always use your most recent med list.                   Brand Name Dispense Instructions for use Diagnosis    aspirin 81 MG tablet      Take 1 tablet (81 mg) by mouth daily        TYLENOL PO      Take by mouth At Bedtime

## 2018-03-16 NOTE — PROGRESS NOTES
The patient presents to the clinic today for a suture removal. She had a lower lip lesion removed by Dr. Billings 1 week ago. All sutures were removed. Area appears clean and intact. She will continue following the wound care instructions at home. She will follow up in surgery at [facility] next Friday.

## 2018-03-20 ENCOUNTER — OFFICE VISIT (OUTPATIENT)
Dept: FAMILY MEDICINE | Facility: OTHER | Age: 83
End: 2018-03-20
Attending: FAMILY MEDICINE
Payer: MEDICARE

## 2018-03-20 VITALS
DIASTOLIC BLOOD PRESSURE: 74 MMHG | WEIGHT: 185 LBS | OXYGEN SATURATION: 98 % | TEMPERATURE: 96.6 F | SYSTOLIC BLOOD PRESSURE: 132 MMHG | HEIGHT: 62 IN | HEART RATE: 67 BPM | BODY MASS INDEX: 34.04 KG/M2

## 2018-03-20 DIAGNOSIS — Z01.818 PREOP GENERAL PHYSICAL EXAM: Primary | ICD-10-CM

## 2018-03-20 DIAGNOSIS — K13.0 LIP LESION: ICD-10-CM

## 2018-03-20 LAB
ALBUMIN SERPL-MCNC: 3.8 G/DL (ref 3.4–5)
ALP SERPL-CCNC: 90 U/L (ref 40–150)
ALT SERPL W P-5'-P-CCNC: 18 U/L (ref 0–50)
ANION GAP SERPL CALCULATED.3IONS-SCNC: 7 MMOL/L (ref 3–14)
AST SERPL W P-5'-P-CCNC: 20 U/L (ref 0–45)
BILIRUB SERPL-MCNC: 0.4 MG/DL (ref 0.2–1.3)
BUN SERPL-MCNC: 18 MG/DL (ref 7–30)
CALCIUM SERPL-MCNC: 9.4 MG/DL (ref 8.5–10.1)
CHLORIDE SERPL-SCNC: 102 MMOL/L (ref 94–109)
CO2 SERPL-SCNC: 28 MMOL/L (ref 20–32)
CREAT SERPL-MCNC: 0.84 MG/DL (ref 0.52–1.04)
ERYTHROCYTE [DISTWIDTH] IN BLOOD BY AUTOMATED COUNT: 12.8 % (ref 10–15)
GFR SERPL CREATININE-BSD FRML MDRD: 64 ML/MIN/1.7M2
GLUCOSE SERPL-MCNC: 75 MG/DL (ref 70–99)
HCT VFR BLD AUTO: 40.3 % (ref 35–47)
HGB BLD-MCNC: 13.7 G/DL (ref 11.7–15.7)
MCH RBC QN AUTO: 31.4 PG (ref 26.5–33)
MCHC RBC AUTO-ENTMCNC: 34 G/DL (ref 31.5–36.5)
MCV RBC AUTO: 92 FL (ref 78–100)
PLATELET # BLD AUTO: 195 10E9/L (ref 150–450)
POTASSIUM SERPL-SCNC: 4.2 MMOL/L (ref 3.4–5.3)
PROT SERPL-MCNC: 8.1 G/DL (ref 6.8–8.8)
RBC # BLD AUTO: 4.37 10E12/L (ref 3.8–5.2)
SODIUM SERPL-SCNC: 137 MMOL/L (ref 133–144)
WBC # BLD AUTO: 9.5 10E9/L (ref 4–11)

## 2018-03-20 PROCEDURE — 93005 ELECTROCARDIOGRAM TRACING: CPT

## 2018-03-20 PROCEDURE — G0463 HOSPITAL OUTPT CLINIC VISIT: HCPCS

## 2018-03-20 PROCEDURE — 36415 COLL VENOUS BLD VENIPUNCTURE: CPT | Mod: ZL | Performed by: FAMILY MEDICINE

## 2018-03-20 PROCEDURE — 85027 COMPLETE CBC AUTOMATED: CPT | Mod: ZL | Performed by: FAMILY MEDICINE

## 2018-03-20 PROCEDURE — 80053 COMPREHEN METABOLIC PANEL: CPT | Mod: ZL | Performed by: FAMILY MEDICINE

## 2018-03-20 PROCEDURE — 99213 OFFICE O/P EST LOW 20 MIN: CPT | Performed by: FAMILY MEDICINE

## 2018-03-20 PROCEDURE — 93010 ELECTROCARDIOGRAM REPORT: CPT | Performed by: INTERNAL MEDICINE

## 2018-03-20 ASSESSMENT — ANXIETY QUESTIONNAIRES
1. FEELING NERVOUS, ANXIOUS, OR ON EDGE: NOT AT ALL
6. BECOMING EASILY ANNOYED OR IRRITABLE: NOT AT ALL
GAD7 TOTAL SCORE: 0
IF YOU CHECKED OFF ANY PROBLEMS ON THIS QUESTIONNAIRE, HOW DIFFICULT HAVE THESE PROBLEMS MADE IT FOR YOU TO DO YOUR WORK, TAKE CARE OF THINGS AT HOME, OR GET ALONG WITH OTHER PEOPLE: NOT DIFFICULT AT ALL
5. BEING SO RESTLESS THAT IT IS HARD TO SIT STILL: NOT AT ALL
3. WORRYING TOO MUCH ABOUT DIFFERENT THINGS: NOT AT ALL
7. FEELING AFRAID AS IF SOMETHING AWFUL MIGHT HAPPEN: NOT AT ALL
2. NOT BEING ABLE TO STOP OR CONTROL WORRYING: NOT AT ALL

## 2018-03-20 ASSESSMENT — PAIN SCALES - GENERAL: PAINLEVEL: NO PAIN (0)

## 2018-03-20 ASSESSMENT — PATIENT HEALTH QUESTIONNAIRE - PHQ9: 5. POOR APPETITE OR OVEREATING: NOT AT ALL

## 2018-03-20 NOTE — PATIENT INSTRUCTIONS
Before Your Surgery      Call your surgeon if there is any change in your health. This includes signs of a cold or flu (such as a sore throat, runny nose, cough, rash or fever).    Do not smoke, drink alcohol or take over the counter medicine (unless your surgeon or primary care doctor tells you to) for the 24 hours before and after surgery.    If you take prescribed drugs: Follow your doctor s orders about which medicines to take and which to stop until after surgery.    Eating and drinking prior to surgery: follow the instructions from your surgeon    Take a shower or bath the night before surgery. Use the soap your surgeon gave you to gently clean your skin. If you do not have soap from your surgeon, use your regular soap. Do not shave or scrub the surgery site.  Wear clean pajamas and have clean sheets on your bed.     Nothing to eat or drink or medications after midnight the night prior to surgery

## 2018-03-20 NOTE — PROGRESS NOTES
[facility] [place]  [address]  [place] [address]  [phone]  Dept: [phone]    PRE-OP EVALUATION:  Today's date: 3/20/2018    Norma Banerjee (: 1930) presents for pre-operative evaluation assessment as requested by Dr. Billings.  She requires evaluation and anesthesia risk assessment prior to undergoing surgery/procedure for treatment of skin lesion .    Proposed Surgery/ Procedure: Fixing a skin lesion   Date of Surgery/ Procedure: 2018  Time of Surgery/ Procedure: [facility]  Hospital/Surgical Facility: [place]  Primary Physician: Carol Foreman  Type of Anesthesia Anticipated: to be determined    Patient has a Health Care Directive or Living Will:  YES, on file    1. YES - DO YOU HAVE A HISTORY OF HEART ATTACK, STROKE, STENT, BYPASS OR SURGERY ON AN ARTERY IN THE HEAD, NECK, HEART OR LEG? Been told she has had a heart attack in the past  2. YES - DO YOU EVER HAVE ANY PAIN OR DISCOMFORT IN YOUR CHEST? Wasn't feeling quite right  yesterday morning, no chest pain or pressure. Walking her normal 1 1/2 mile walk outside helped her to feel better  3. NO - Do you have a history of  Heart Failure?  4. NO - Are you troubled by shortness of breath when: walking on the level, up a slight hill or at night?  5. NO - Do you currently have a cold, bronchitis or other respiratory infection?  6. NO - Do you have a cough, shortness of breath or wheezing?  7. NO - Do you sometimes get pains in the calves of your legs when you walk?  8. NO - Do you or anyone in your family have previous history of blood clots?  9. NO - Do you or does anyone in your family have a serious bleeding problem such as prolonged bleeding following surgeries or cuts?  10. NO - Have you ever had problems with anemia or been told to take iron pills?  11. NO - Have you had any abnormal blood loss such as black, tarry or bloody stools, or abnormal vaginal bleeding?  12. NO - Have you ever had a blood transfusion?  13. NO -  Have you or any of your relatives ever had problems with anesthesia?  14. NO - Do you have sleep apnea, excessive snoring or daytime drowsiness?  15. NO - Do you have any prosthetic heart valves?  16. YES - DO YOU HAVE PROSTHETIC JOINTS? Knees   17. NO - Is there any chance that you may be pregnant?      HPI:     HPI related to upcoming procedure: further removal of basal cell carcinoma of the left lower lip          MEDICAL HISTORY:     Patient Active Problem List    Diagnosis Date Noted     Abnormal stress test 12/13/2017     Priority: Medium     Atypical chest pain 12/13/2017     Priority: Medium     ACP (advance care planning) 10/31/2016     Priority: Medium     Advance Care Planning 11/14/2016: Receipt of ACP document:  Received: POLST which was signed and dated by provider on 11/3/16.  Document previously scanned on 11/8/16.  Order reviewed and found to be valid.  Code Status reflects choices in most recent ACP document.  Confirmed/documented designated decision maker(s).  Added by Melba Luu  Advance Care Planning 10/31/2016: ACP Review of Chart / Resources Provided:  Reviewed chart for advance care plan.  Norma JIN Chriss has no plan or code status on file. Discussed available resources and provided with information. Confirmed code status reflects current choices pending further ACP discussions.  Confirmed/documented legally designated decision makers.  Added by Mariana Richards               Incomplete tear of right rotator cuff 10/31/2016     Priority: Medium     Osteoarthritis 10/01/2012     Priority: Medium     Problem list name updated by automated process. Provider to review       Urinary incontinence 11/16/2006     Priority: Medium     Problem list name updated by automated process. Provider to review        Past Medical History:   Diagnosis Date     Carpal tunnel syndrome 02/14/2011     Dermoid cyst of ovary 03/05/2008    removed     Gout 09/26/2011     Osteoarthritis 10/01/2012     Recurrent  UTI 09/26/2011     Urinary incontinence 11/16/2006     Weakness of both legs 09/26/2011     Past Surgical History:   Procedure Laterality Date     ARTHROSCOPY KNEE      Osteoarthritis, left     C THALLIUM STRESS TEST  2007    negative persantine thallium GXT, Chest pain     CATARACT IOL, RT/LT      bilateral cataract surgery     CHOLECYSTECTOMY      Cholecystitis     COLONOSCOPY  4/2008    free of disease     HC ECP WITH CATARACT SURGERY       JOINT REPLACEMENT      Osteoarthritis, bilateral     RELEASE CARPAL TUNNEL  2011    RT     SURGICAL PATHOLOGY EXAM      ovarian mass, laparotomy     Current Outpatient Prescriptions   Medication Sig Dispense Refill     aspirin 81 MG tablet Take 1 tablet (81 mg) by mouth daily  OTC     Acetaminophen (TYLENOL PO) Take by mouth At Bedtime       OTC products: None, except as noted above    Allergies   Allergen Reactions     Alendronate Sodium      Hot tolerance  Fosamax       Nitrofurantoin      Chill  Fever  Chest pain  Macrobid       Nitrofurantoin Macrocrystal      Chills   Fever  Chest pain  Macrobid       Propoxyphene Napsylate      Darvocet-N 100       Sulfamethoxazole Rash     Bactrim DS     Trimethoprim Rash     Bactrim DS        Latex Allergy: NO    Social History   Substance Use Topics     Smoking status: Former Smoker     Types: Cigarettes     Smokeless tobacco: Never Used      Comment: tried to quit yes, passive exposure no     Alcohol use No     History   Drug Use No       REVIEW OF SYSTEMS:   CONSTITUTIONAL: NEGATIVE for fever, chills, change in weight  INTEGUMENTARY/SKIN: NEGATIVE for worrisome rashes, moles or lesions  EYES: NEGATIVE for vision changes or irritation  ENT/MOUTH: NEGATIVE for ear, mouth and throat problems  RESP: NEGATIVE for significant cough or SOB  BREAST: NEGATIVE for masses, tenderness or discharge  CV: NEGATIVE for chest pain, palpitations or peripheral edema, she is walking her 1 1/2 miles daily without chest pain or dyspnea  GI: NEGATIVE for  "nausea, abdominal pain, heartburn, or change in bowel habits  : NEGATIVE for frequency, dysuria, or hematuria  MUSCULOSKELETAL: NEGATIVE for significant arthralgias or myalgia  NEURO: NEGATIVE for weakness, dizziness or paresthesias  ENDOCRINE: NEGATIVE for temperature intolerance, skin/hair changes  HEME: NEGATIVE for bleeding problems  PSYCHIATRIC: NEGATIVE for changes in mood or affect    EXAM:   /74  Pulse 67  Temp 96.6  F (35.9  C) (Tympanic)  Ht 5' 2\" (1.575 m)  Wt 185 lb (83.9 kg)  SpO2 98%  BMI 33.84 kg/m2    GENERAL APPEARANCE: healthy, alert and no distress     EYES: EOMI, PERRL     HENT: ear canals and TM's normal and nose and mouth without ulcers or lesions     NECK: no adenopathy, no asymmetry, masses, or scars and thyroid normal to palpation     RESP: lungs clear to auscultation - no rales, rhonchi or wheezes     CV: regular rates and rhythm, normal S1 S2, no S3 or S4 and no murmur, click or rub     ABDOMEN:  soft, nontender, no HSM or masses and bowel sounds normal     MS: extremities normal- no gross deformities noted, no evidence of inflammation in joints, FROM in all extremities.     SKIN: no suspicious lesions or rashes     NEURO: Normal strength and tone, sensory exam grossly normal, mentation intact and speech normal     PSYCH: mentation appears normal. and affect normal/bright     LYMPHATICS: No cervical adenopathy    DIAGNOSTICS:     EKG: sinus bradycardia, rate 52, normal axis, normal intervals, no acute ST/T changes c/w ischemia, no LVH by voltage criteria  Labs Resulted Today:   Results for orders placed or performed in visit on 03/20/18   CBC with platelets   Result Value Ref Range    WBC 9.5 4.0 - 11.0 10e9/L    RBC Count 4.37 3.8 - 5.2 10e12/L    Hemoglobin 13.7 11.7 - 15.7 g/dL    Hematocrit 40.3 35.0 - 47.0 %    MCV 92 78 - 100 fl    MCH 31.4 26.5 - 33.0 pg    MCHC 34.0 31.5 - 36.5 g/dL    RDW 12.8 10.0 - 15.0 %    Platelet Count 195 150 - 450 10e9/L   Comprehensive " metabolic panel   Result Value Ref Range    Sodium 137 133 - 144 mmol/L    Potassium 4.2 3.4 - 5.3 mmol/L    Chloride 102 94 - 109 mmol/L    Carbon Dioxide 28 20 - 32 mmol/L    Anion Gap 7 3 - 14 mmol/L    Glucose 75 70 - 99 mg/dL    Urea Nitrogen 18 7 - 30 mg/dL    Creatinine 0.84 0.52 - 1.04 mg/dL    GFR Estimate 64 >60 mL/min/1.7m2    GFR Estimate If Black 77 >60 mL/min/1.7m2    Calcium 9.4 8.5 - 10.1 mg/dL    Bilirubin Total 0.4 0.2 - 1.3 mg/dL    Albumin 3.8 3.4 - 5.0 g/dL    Protein Total 8.1 6.8 - 8.8 g/dL    Alkaline Phosphatase 90 40 - 150 U/L    ALT 18 0 - 50 U/L    AST 20 0 - 45 U/L       Recent Labs   Lab Test  11/15/17   0950  10/31/16   1017   HGB  13.2  12.6   PLT  190  261   NA  139  137   POTASSIUM  4.0  3.9   CR  0.86  0.79        IMPRESSION:   Reason for surgery/procedure: further removal of basal cell carcinoma of the left lower lip    The proposed surgical procedure is considered LOW risk.    REVISED CARDIAC RISK INDEX  The patient has the following serious cardiovascular risks for perioperative complications such as (MI, PE, VFib and 3  AV Block):  No serious cardiac risks  INTERPRETATION: 0 risks: Class I (very low risk - 0.4% complication rate)    The patient has the following additional risks for perioperative complications:  No identified additional risks      ICD-10-CM    1. Preop general physical exam Z01.818 EKG 12-lead complete w/read - (Clinic Performed)     CBC with platelets     Comprehensive metabolic panel   2. Lip lesion K13.0 Basal cell carcinoma       RECOMMENDATIONS:         Patient is on no medications, hold ibuprofen prior to surgery, NPO after midnight the night prior    APPROVAL GIVEN to proceed with proposed procedure, without further diagnostic evaluation       Signed Electronically by: Carol Foreman MD    Copy of this evaluation report is provided to requesting physician.    [facility] Preop Guidelines

## 2018-03-20 NOTE — NURSING NOTE
"Chief Complaint   Patient presents with     Pre-Op Exam     03/23/2018-[facility]-S[facility]ion        Initial /74  Pulse 67  Temp 96.6  F (35.9  C) (Tympanic)  Ht 5' 2\" (1.575 m)  Wt 185 lb (83.9 kg)  SpO2 98%  BMI 33.84 kg/m2 Estimated body mass index is 33.84 kg/(m^2) as calculated from the following:    Height as of this encounter: 5' 2\" (1.575 m).    Weight as of this encounter: 185 lb (83.9 kg).  Medication Reconciliation: complete  "

## 2018-03-20 NOTE — MR AVS SNAPSHOT
After Visit Summary   3/20/2018    Norma Banerjee    MRN: [id number]           Patient Information     Date Of Birth          1/1/1930        Visit Information        Provider Department      3/20/2018 9:30 AM Carol Foreman MD [facility] [place]        Today's Diagnoses     Preop general physical exam    -  1    Lip lesion          Care Instructions      Before Your Surgery      Call your surgeon if there is any change in your health. This includes signs of a cold or flu (such as a sore throat, runny nose, cough, rash or fever).    Do not smoke, drink alcohol or take over the counter medicine (unless your surgeon or primary care doctor tells you to) for the 24 hours before and after surgery.    If you take prescribed drugs: Follow your doctor s orders about which medicines to take and which to stop until after surgery.    Eating and drinking prior to surgery: follow the instructions from your surgeon    Take a shower or bath the night before surgery. Use the soap your surgeon gave you to gently clean your skin. If you do not have soap from your surgeon, use your regular soap. Do not shave or scrub the surgery site.  Wear clean pajamas and have clean sheets on your bed.     Nothing to eat or drink or medications after midnight the night prior to surgery          Follow-ups after your visit        Your next 10 appointments already scheduled     Apr 18, 2018  9:45 AM CDT   (Arrive by 9:30 AM)   Post Op with Renetta Hayes PA-C   [facility] ([facility] )    [address]  [place] [address]   [phone]              Who to contact     If you have questions or need follow up information about today's clinic visit or your schedule please contact [facility] directly at [phone].  Normal or non-critical lab and imaging results will be communicated to you by MyChart, letter or phone within 4 business days after the clinic has received the  "results. If you do not hear from us within 7 days, please contact the clinic through [employer]Pario or phone. If you have a critical or abnormal lab result, we will notify you by phone as soon as possible.  Submit refill requests through nPario or call your pharmacy and they will forward the refill request to us. Please allow 3 business days for your refill to be completed.          Additional Information About Your Visit        [facility] Information     nPario gives you secure access to your electronic health record. If you see a primary care provider, you can also send messages to your care team and make appointments. If you have questions, please call your primary care clinic.  If you do not have a primary care provider, please call [phone] and they will assist you.        Care EveryWhere ID     This is your Care EveryWhere ID. This could be used by other organizations to access your [facility] medical records  [id number]        Your Vitals Were     Pulse Temperature Height Pulse Oximetry BMI (Body Mass Index)       67 96.6  F (35.9  C) (Tympanic) 5' 2\" (1.575 m) 98% 33.84 kg/m2        Blood Pressure from Last 3 Encounters:   03/20/18 132/74   03/11/18 140/60   03/09/18 130/84    Weight from Last 3 Encounters:   03/20/18 185 lb (83.9 kg)   03/09/18 190 lb (86.2 kg)   12/13/17 190 lb (86.2 kg)              We Performed the Following     CBC with platelets     Comprehensive metabolic panel     EKG 12-lead complete w/read - (Clinic Performed)        Primary Care Provider Office Phone # Fax #    Carol Foreman [phone] [address] [phone]        Equal Access to Services     ROSEANN JAMIL : Hadii arnoldo Saleh, mars thomas, nii bro. So [facility] [phone].    ATENCIÓN: Si habla español, tiene a oliva disposición servicios gratuitos de asistencia lingüística. Llame al [phone].    We comply with " applicable federal civil rights laws and [place] laws. We do not discriminate on the basis of race, color, national origin, age, disability, sex, sexual orientation, or gender identity.            Thank you!     Thank you for choosing [facility]  for your care. Our goal is always to provide you with excellent care. Hearing back from our patients is one way we can continue to improve our services. Please take a few minutes to complete the written survey that you may receive in the mail after your visit with us. Thank you!             Your Updated Medication List - Protect others around you: Learn how to safely use, store and throw away your medicines at www.disposemymeds.org.          This list is accurate as of 3/20/18 10:03 AM.  Always use your most recent med list.                   Brand Name Dispense Instructions for use Diagnosis    aspirin 81 MG tablet      Take 1 tablet (81 mg) by mouth daily        TYLENOL PO      Take by mouth At Bedtime

## 2018-03-21 ASSESSMENT — PATIENT HEALTH QUESTIONNAIRE - PHQ9: SUM OF ALL RESPONSES TO PHQ QUESTIONS 1-9: 0

## 2018-03-21 ASSESSMENT — ANXIETY QUESTIONNAIRES: GAD7 TOTAL SCORE: 0

## 2018-03-22 ENCOUNTER — TELEPHONE (OUTPATIENT)
Dept: FAMILY MEDICINE | Facility: OTHER | Age: 83
End: 2018-03-22

## 2018-03-23 ENCOUNTER — APPOINTMENT (OUTPATIENT)
Dept: LAB | Facility: HOSPITAL | Age: 83
End: 2018-03-23
Attending: OTOLARYNGOLOGY
Payer: MEDICARE

## 2018-03-23 ENCOUNTER — RESULTS ONLY (OUTPATIENT)
Dept: LAB | Age: 83
End: 2018-03-23

## 2018-03-23 ENCOUNTER — TRANSFERRED RECORDS (OUTPATIENT)
Dept: HEALTH INFORMATION MANAGEMENT | Facility: CLINIC | Age: 83
End: 2018-03-23

## 2018-03-23 ENCOUNTER — OFFICE VISIT (OUTPATIENT)
Dept: ANESTHESIOLOGY | Facility: HOSPITAL | Age: 83
End: 2018-03-23
Attending: OTOLARYNGOLOGY
Payer: COMMERCIAL

## 2018-03-23 PROCEDURE — 99100 ANES PT EXTEME AGE<1 YR&>70: CPT | Performed by: NURSE ANESTHETIST, CERTIFIED REGISTERED

## 2018-03-23 PROCEDURE — 00300 ANES ALL PX INTEG H/N/PTRUNK: CPT | Mod: QZ | Performed by: NURSE ANESTHETIST, CERTIFIED REGISTERED

## 2018-03-23 PROCEDURE — 14040 TIS TRNFR F/C/C/M/N/A/G/H/F: CPT | Performed by: OTOLARYNGOLOGY

## 2018-03-24 NOTE — OP NOTE
Procedure Date: 2018      PREOPERATIVE DIAGNOSES:  Left lower lip basal cell carcinoma.      POSTOPERATIVE DIAGNOSIS:  Left lower lip basal cell carcinoma.      PROCEDURES:  Excision left lower lip basal cell carcinoma with defect 4 cm2 and closure using advancement flap with intermediate repair.      ANESTHESIA:  MAC with local.      ESTIMATED BLOOD LOSS:  1 mL.      COMPLICATIONS:  None.      SURGICAL FINDINGS:  3 mm circumferential clear margins.      DESCRIPTION OF PROCEDURE:  After surgical consent was obtained, the patient was brought back to the operating room, laid in comfortable and supine position.  The vermilion line was demarcated with a skin marker.  She was then anesthetized to the left lower lip with 1% lidocaine with 1:200,000 of epinephrine.  She was draped and prepped in normal sterile fashion.  A timeout was taken.  Surgical loupes were worn throughout the procedure.  A #15 blade was used to incise the skin in a V-wedge excision through lip mucosa and muscle in a through and through fashion.  The wound was completely removed with 3 mm circumferential clear margins.  The lesion was marked at 12 o'clock with a suture.  Hemostasis was achieved with scant use of bipolar cautery at 10 and was adequate.  The wound was irrigated.  The defect measured 2 x 2 cm (4 cm2).  Photos were taken.  The orbicularis was carefully reapproximated at the white lip and mucosa with 4-0 and 5-0 Vicryl.  The vermilion line was carefully reapproximated with 5-0 nylon, and the skin was closed with 5-0 nylon.  The oral mucosa was closed with 4-0 chromic in 2 layers.  Bacitracin was applied.  Steri-Strips were applied to the skin.  Photos were taken.  The patient was handed back over to Anesthesia, having tolerated the procedure well.         SANDRA ROBERTO DO             D: 2018   T: 2018   MT: STACY      Name:     DEB BARTHOLOMEW   MRN:      [id number]        Account:        [id number]   :       01/01/1930           Procedure Date: 03/23/2018      Document: [id number]

## 2018-03-26 LAB — COPATH REPORT: NORMAL

## 2018-03-30 ENCOUNTER — OFFICE VISIT (OUTPATIENT)
Dept: OTOLARYNGOLOGY | Facility: OTHER | Age: 83
End: 2018-03-30
Attending: PHYSICIAN ASSISTANT
Payer: MEDICARE

## 2018-03-30 VITALS
BODY MASS INDEX: 34.04 KG/M2 | HEIGHT: 62 IN | OXYGEN SATURATION: 98 % | HEART RATE: 64 BPM | TEMPERATURE: 98.4 F | SYSTOLIC BLOOD PRESSURE: 134 MMHG | WEIGHT: 185 LBS | DIASTOLIC BLOOD PRESSURE: 76 MMHG

## 2018-03-30 DIAGNOSIS — Z48.02 ENCOUNTER FOR REMOVAL OF SUTURES: ICD-10-CM

## 2018-03-30 DIAGNOSIS — Z09 POSTOPERATIVE EXAMINATION: Primary | ICD-10-CM

## 2018-03-30 DIAGNOSIS — H61.23 BILATERAL IMPACTED CERUMEN: ICD-10-CM

## 2018-03-30 PROCEDURE — G0463 HOSPITAL OUTPT CLINIC VISIT: HCPCS

## 2018-03-30 PROCEDURE — 92504 EAR MICROSCOPY EXAMINATION: CPT | Performed by: PHYSICIAN ASSISTANT

## 2018-03-30 PROCEDURE — 69210 REMOVE IMPACTED EAR WAX UNI: CPT | Performed by: PHYSICIAN ASSISTANT

## 2018-03-30 PROCEDURE — 99024 POSTOP FOLLOW-UP VISIT: CPT | Performed by: PHYSICIAN ASSISTANT

## 2018-03-30 ASSESSMENT — PAIN SCALES - GENERAL: PAINLEVEL: NO PAIN (0)

## 2018-03-30 NOTE — NURSING NOTE
"Chief Complaint   Patient presents with     Surgical Followup     S/P Excison Left Lower Lip Lesion with Flap Repair on 3/23/18 at [facility]       Initial /76 (Cuff Size: Adult Regular)  Pulse 64  Temp 98.4  F (36.9  C) (Tympanic)  Ht 5' 2\" (1.575 m)  Wt 185 lb (83.9 kg)  SpO2 98%  BMI 33.84 kg/m2 Estimated body mass index is 33.84 kg/(m^2) as calculated from the following:    Height as of this encounter: 5' 2\" (1.575 m).    Weight as of this encounter: 185 lb (83.9 kg).  Medication Reconciliation: lisset Cortés      "

## 2018-03-30 NOTE — LETTER
"    3/30/2018         RE: Norma Banerjee  [address]  [place] [address]        Dear Colleague,    Thank you for referring your patient, Norma Banerjee, to the [facility] [place]. Please see a copy of my visit note below.    Chief Complaint   Patient presents with     Surgical Followup     S/P Excison Left Lower Lip Lesion with Flap Repair on 3/23/18 at [facility]     Doing well. No concerns.   No bleeding.  Mild numbness.   SPECIMEN(S):   Skin, left lower lip     FINAL DIAGNOSIS:   Left lower lip, excision   - Foreign body reaction     I have personally reviewed all specimens and/or slides, including the   listed special stains, and used them   with my medical judgement to determine or confirm the final diagnosis.     Electronically signed out by:     Duke Hubbard M.D.     Past Medical History:   Diagnosis Date     Carpal tunnel syndrome 02/14/2011     Dermoid cyst of ovary 03/05/2008    removed     Gout 09/26/2011     Osteoarthritis 10/01/2012     Recurrent UTI 09/26/2011     Urinary incontinence 11/16/2006     Weakness of both legs 09/26/2011        Allergies   Allergen Reactions     Alendronate Sodium      Hot tolerance  Fosamax       Nitrofurantoin      Chill  Fever  Chest pain  Macrobid       Nitrofurantoin Macrocrystal      Chills   Fever  Chest pain  Macrobid       Propoxyphene Napsylate      Darvocet-N 100       Sulfamethoxazole Rash     Bactrim DS     Trimethoprim Rash     Bactrim DS       Current Outpatient Prescriptions   Medication     aspirin 81 MG tablet     Acetaminophen (TYLENOL PO)     No current facility-administered medications for this visit.       ROS: 10 point ROS neg other than the symptoms noted above in the HPI.  /76 (Cuff Size: Adult Regular)  Pulse 64  Temp 98.4  F (36.9  C) (Tympanic)  Ht 5' 2\" (1.575 m)  Wt 185 lb (83.9 kg)  SpO2 98%  BMI 33.84 kg/m2  General - The patient is well nourished and well developed, and appears to have good " nutritional status.  Alert and oriented to person and place, answers questions and cooperates with examination appropriately.   Head and Face - Normocephalic and atraumatic, with no gross asymmetry noted.  The facial nerve is intact, with strong symmetric movements.  Skin-there is a left lower lip site of healing. Sutures were removed by LPN. Excision is healing well, no dehiscence. 2 chromic sutures remain orally. These were not removed. No seroma. Mild edema remaining.   This does not involve the mucosal lip bimanual palpation is negative for any deep mass within the lower lip  Voice and Breathing - The patient was breathing comfortably without the use of accessory muscles. There was no wheezing, stridor, or stertor.  The patients voice was clear and strong, and had appropriate pitch and quality..  Eyes - Extraocular movements intact, and the pupils were reactive to light.  Sclera were not icteric or injected, conjunctiva were pink and moist.   Neck - Normal midline excursion of the laryngotracheal complex during swallowing.  Full range of motion on passive movement.  Palpation of the occipital, submental, submandibular, internal jugular chain, and supraclavicular nodes did not demonstrate any abnormal lymph nodes or masses.  Palpation of the thyroid was soft and smooth, with no nodules or goiter appreciated.  The trachea was mobile and midline.  Ears- Ears examined under microscopy. Canals cleaned with cupped forceps. Canals clear. Bilateral TM's are intact without effusion or retraction.     ASSESSMENT:    ICD-10-CM    1. Postoperative examination Z09    2. Encounter for removal of sutures Z48.02    3. Bilateral impacted cerumen H61.23        Sutures removed.   2 dissolvable suture remain. These should fall out on their own  If they bother you- call nurse and stop in for removal.   Continue with wound cares.   Peroxide/ water before/ after meals-     She is happy w/ results.   Ears were cleaned. Tolerated well.        Renetta Hayes PA-C  [facility], [place]  [phone]      Again, thank you for allowing me to participate in the care of your patient.        Sincerely,        Renetta Hayes PA-C

## 2018-03-30 NOTE — PROGRESS NOTES
"Chief Complaint   Patient presents with     Surgical Followup     S/P Excison Left Lower Lip Lesion with Flap Repair on 3/23/18 at [facility]     Doing well. No concerns.   No bleeding.  Mild numbness.   SPECIMEN(S):   Skin, left lower lip     FINAL DIAGNOSIS:   Left lower lip, excision   - Foreign body reaction     I have personally reviewed all specimens and/or slides, including the   listed special stains, and used them   with my medical judgement to determine or confirm the final diagnosis.     Electronically signed out by:     Duke Hubbard M.D.     Past Medical History:   Diagnosis Date     Carpal tunnel syndrome 02/14/2011     Dermoid cyst of ovary 03/05/2008    removed     Gout 09/26/2011     Osteoarthritis 10/01/2012     Recurrent UTI 09/26/2011     Urinary incontinence 11/16/2006     Weakness of both legs 09/26/2011        Allergies   Allergen Reactions     Alendronate Sodium      Hot tolerance  Fosamax       Nitrofurantoin      Chill  Fever  Chest pain  Macrobid       Nitrofurantoin Macrocrystal      Chills   Fever  Chest pain  Macrobid       Propoxyphene Napsylate      Darvocet-N 100       Sulfamethoxazole Rash     Bactrim DS     Trimethoprim Rash     Bactrim DS       Current Outpatient Prescriptions   Medication     aspirin 81 MG tablet     Acetaminophen (TYLENOL PO)     No current facility-administered medications for this visit.       ROS: 10 point ROS neg other than the symptoms noted above in the HPI.  /76 (Cuff Size: Adult Regular)  Pulse 64  Temp 98.4  F (36.9  C) (Tympanic)  Ht 5' 2\" (1.575 m)  Wt 185 lb (83.9 kg)  SpO2 98%  BMI 33.84 kg/m2  General - The patient is well nourished and well developed, and appears to have good nutritional status.  Alert and oriented to person and place, answers questions and cooperates with examination appropriately.   Head and Face - Normocephalic and atraumatic, with no gross asymmetry noted.  The facial nerve is intact, with strong " symmetric movements.  Skin-there is a left lower lip site of healing. Sutures were removed by LPN. Excision is healing well, no dehiscence. 2 chromic sutures remain orally. These were not removed. No seroma. Mild edema remaining.   This does not involve the mucosal lip bimanual palpation is negative for any deep mass within the lower lip  Voice and Breathing - The patient was breathing comfortably without the use of accessory muscles. There was no wheezing, stridor, or stertor.  The patients voice was clear and strong, and had appropriate pitch and quality..  Eyes - Extraocular movements intact, and the pupils were reactive to light.  Sclera were not icteric or injected, conjunctiva were pink and moist.   Neck - Normal midline excursion of the laryngotracheal complex during swallowing.  Full range of motion on passive movement.  Palpation of the occipital, submental, submandibular, internal jugular chain, and supraclavicular nodes did not demonstrate any abnormal lymph nodes or masses.  Palpation of the thyroid was soft and smooth, with no nodules or goiter appreciated.  The trachea was mobile and midline.  Ears- Ears examined under microscopy. Canals cleaned with cupped forceps. Canals clear. Bilateral TM's are intact without effusion or retraction.     ASSESSMENT:    ICD-10-CM    1. Postoperative examination Z09    2. Encounter for removal of sutures Z48.02    3. Bilateral impacted cerumen H61.23        Sutures removed.   2 dissolvable suture remain. These should fall out on their own  If they bother you- call nurse and stop in for removal.   Continue with wound cares.   Peroxide/ water before/ after meals-     She is happy w/ results.   Ears were cleaned. Tolerated well.       Renetta Hayes PA-C  ENT  [facility] [place]  [phone]

## 2018-03-30 NOTE — MR AVS SNAPSHOT
After Visit Summary   3/30/2018    Norma Banerjee    MRN: [id number]           Patient Information     Date Of Birth          1/1/1930        Visit Information        Provider Department      3/30/2018 10:30 AM Renetta Hayes PA-C [facility]        Today's Diagnoses     Encounter for removal of sutures    -  1    Postoperative examination          Care Instructions    Sutures removed.   2 dissolvable suture remain. These should fall out on their own  If they bother you- call nurse and stop in for removal.   Continue with wound cares.   Peroxide/ water before/ after meals-     Thank you for allowing [facility] and our ENT team to participate in your care.  If your medications are too expensive, please give the nurse a call.  We can possibly change this medication.  If you have a scheduling or an appointment question please contact [facility] Unit Coordinator at their direct line [phone].   ALL nursing questions or concerns can be directed to your ENT nurse at: [phone] Shania              Follow-ups after your visit        Your next 10 appointments already scheduled     Apr 18, 2018  9:45 AM CDT   (Arrive by 9:30 AM)   Post Op with Renetta Hayes PA-C   [facility] )    [address]  [place] [address]   [phone]              Who to contact     If you have questions or need follow up information about today's clinic visit or your schedule please contact [facility] directly at [phone].  Normal or non-critical lab and imaging results will be communicated to you by MyChart, letter or phone within 4 business days after the clinic has received the results. If you do not hear from us within 7 days, please contact the clinic through MyChart or phone. If you have a critical or abnormal lab result, we will notify you by phone as soon as possible.  Submit refill requests through "" or call your pharmacy and  "they will forward the refill request to us. Please allow 3 business days for your refill to be completed.          Additional Information About Your Visit        [employer] Information     [employer] gives you secure access to your electronic health record. If you see a primary care provider, you can also send messages to your care team and make appointments. If you have questions, please call your primary care clinic.  If you do not have a primary care provider, please call [phone] and they will assist you.        Care EveryWhere ID     This is your Care EveryWhere ID. This could be used by other organizations to access your [place] medical records  [id number]        Your Vitals Were     Pulse Temperature Height Pulse Oximetry BMI (Body Mass Index)       64 98.4  F (36.9  C) (Tympanic) 5' 2\" (1.575 m) 98% 33.84 kg/m2        Blood Pressure from Last 3 Encounters:   03/30/18 134/76   03/20/18 132/74   03/11/18 140/60    Weight from Last 3 Encounters:   03/30/18 185 lb (83.9 kg)   03/20/18 185 lb (83.9 kg)   03/09/18 190 lb (86.2 kg)              Today, you had the following     No orders found for display       Primary Care Provider Office Phone # Fax #    Carol Foreman [phone]       [address] [place] [address]        Equal Access to Services     YOUNG JAMIL AH: Hadii arnoldo ku hadasho Soomaali, waaxda luqadaha, qaybta kaalmada gracie, nii beverly. So [facility] [phone].    ATENCIÓN: Si habla español, tiene a oliva disposición servicios gratuitos de asistencia lingüística. Fransisco al [phone].    We comply with applicable federal civil rights laws and [place] laws. We do not discriminate on the basis of race, color, national origin, age, disability, sex, sexual orientation, or gender identity.            Thank you!     Thank you for choosing [facility]  for your care. Our goal is always to provide you with excellent care. Hearing back from " our patients is one way we can continue to improve our services. Please take a few minutes to complete the written survey that you may receive in the mail after your visit with us. Thank you!             Your Updated Medication List - Protect others around you: Learn how to safely use, store and throw away your medicines at www.disposemymeds.org.          This list is accurate as of 3/30/18 10:34 AM.  Always use your most recent med list.                   Brand Name Dispense Instructions for use Diagnosis    aspirin 81 MG tablet      Take 1 tablet (81 mg) by mouth daily        TYLENOL PO      Take by mouth At Bedtime

## 2018-03-30 NOTE — PATIENT INSTRUCTIONS
Sutures removed.   2 dissolvable suture remain. These should fall out on their own  If they bother you- call nurse and stop in for removal.   Continue with wound cares.   Peroxide/ water before/ after meals-     Thank you for allowing [facility] and our ENT team to participate in your care.  If your medications are too expensive, please give the nurse a call.  We can possibly change this medication.  If you have a scheduling or an appointment question please contact [facility] Health Unit Coordinator at their direct line [phone].   ALL nursing questions or concerns can be directed to your ENT nurse at: [phone] Shania

## 2018-04-14 ENCOUNTER — HOSPITAL ENCOUNTER (EMERGENCY)
Facility: HOSPITAL | Age: 83
Discharge: HOME | End: 2018-04-14
Attending: NURSE PRACTITIONER | Admitting: NURSE PRACTITIONER
Payer: MEDICARE

## 2018-04-14 VITALS
TEMPERATURE: 96.1 F | OXYGEN SATURATION: 99 % | SYSTOLIC BLOOD PRESSURE: 159 MMHG | RESPIRATION RATE: 16 BRPM | DIASTOLIC BLOOD PRESSURE: 81 MMHG

## 2018-04-14 DIAGNOSIS — K13.0 LIP ABSCESS: ICD-10-CM

## 2018-04-14 PROCEDURE — 99213 OFFICE O/P EST LOW 20 MIN: CPT | Mod: 24 | Performed by: NURSE PRACTITIONER

## 2018-04-14 PROCEDURE — G0463 HOSPITAL OUTPT CLINIC VISIT: HCPCS

## 2018-04-14 RX ORDER — CHLORHEXIDINE GLUCONATE ORAL RINSE 1.2 MG/ML
15 SOLUTION DENTAL 2 TIMES DAILY
Qty: 150 ML | Refills: 0 | Status: SHIPPED | OUTPATIENT
Start: 2018-04-14 | End: 2018-04-19

## 2018-04-14 NOTE — ED PROVIDER NOTES
History     Chief Complaint   Patient presents with     Lip Laceration     had CA removed from lip a couple weeks ago now a white blister appeared today. pt concerned about infx. no fevers noted.      The history is provided by the patient. No  was used.     Norma Banerjee is a 88 year old female who presents today with a white pustule on her left lower lip.  She had a lesion removed from the area recent and had some retained dissolvable sutures in the area. She noted the pustule this morning.  No fevers or chills.  She is otherwise feeling well.  She has followed up with Renetta MONCADA in ENT, no complications noted at that time.      Problem List:    Patient Active Problem List    Diagnosis Date Noted     Abnormal stress test 12/13/2017     Priority: Medium     Atypical chest pain 12/13/2017     Priority: Medium     ACP (advance care planning) 10/31/2016     Priority: Medium     Advance Care Planning 11/14/2016: Receipt of ACP document:  Received: POLST which was signed and dated by provider on 11/3/16.  Document previously scanned on 11/8/16.  Order reviewed and found to be valid.  Code Status reflects choices in most recent ACP document.  Confirmed/documented designated decision maker(s).  Added by Melba Luu  Advance Care Planning 10/31/2016: ACP Review of Chart / Resources Provided:  Reviewed chart for advance care plan.  Norma Banerjee has no plan or code status on file. Discussed available resources and provided with information. Confirmed code status reflects current choices pending further ACP discussions.  Confirmed/documented legally designated decision makers.  Added by Mariana Richards               Incomplete tear of right rotator cuff 10/31/2016     Priority: Medium     Osteoarthritis 10/01/2012     Priority: Medium     Problem list name updated by automated process. Provider to review       Urinary incontinence 11/16/2006     Priority: Medium     Problem list name  updated by automated process. Provider to review          Past Medical History:    Past Medical History:   Diagnosis Date     Carpal tunnel syndrome 02/14/2011     Dermoid cyst of ovary 03/05/2008     Gout 09/26/2011     Osteoarthritis 10/01/2012     Recurrent UTI 09/26/2011     Urinary incontinence 11/16/2006     Weakness of both legs 09/26/2011       Past Surgical History:    Past Surgical History:   Procedure Laterality Date     ARTHROSCOPY KNEE      Osteoarthritis, left     C THALLIUM STRESS TEST  2007    negative persantine thallium GXT, Chest pain     CATARACT IOL, RT/LT      bilateral cataract surgery     CHOLECYSTECTOMY      Cholecystitis     COLONOSCOPY  4/2008    free of disease     HC ECP WITH CATARACT SURGERY       JOINT REPLACEMENT      Osteoarthritis, bilateral     RELEASE CARPAL TUNNEL  2011    RT     SURGICAL PATHOLOGY EXAM      ovarian mass, laparotomy       Family History:    Family History   Problem Relation Age of Onset     Alzheimer Disease Mother 92     cause of death     CANCER Mother      uterine     C.A.D. Father      CEREBROVASCULAR DISEASE Father 72     cva, cause of death     DIABETES Sister      Hypertension Sister      Hypertension Sister      Hypertension Sister        Social History:  Marital Status:  Single [1]  Social History   Substance Use Topics     Smoking status: Former Smoker     Types: Cigarettes     Smokeless tobacco: Never Used      Comment: tried to quit yes, passive exposure no     Alcohol use No        Medications:      chlorhexidine (PERIDEX) 0.12 % solution   aspirin 81 MG tablet   Acetaminophen (TYLENOL PO)         Review of Systems   Constitutional: Negative for appetite change, chills, fatigue and fever.   Skin:        Left lower lip pustule       Physical Exam   BP: 159/81  Heart Rate: 68  Temp: 96.1  F (35.6  C)  Resp: 16  SpO2: 99 %      Physical Exam   Constitutional: She is oriented to person, place, and time. She appears well-developed and well-nourished.    HENT:   Head: Normocephalic.   Mouth/Throat:       Cardiovascular: Normal rate.    Pulmonary/Chest: Effort normal.   Neurological: She is alert and oriented to person, place, and time.   Psychiatric: She has a normal mood and affect. Her behavior is normal.   Nursing note and vitals reviewed.      ED Course     ED Course     Procedures    Using a sterile q-tip I applied a moderate amount of pressure to the pustule, the pustule popped with white purulent drainage, there was a white FB that appears as a retained suture, followed by bloody drainage.  Patient rinsed mouth and blotted area briefly, bleeding resolved and symptoms resolved    Assessments & Plan (with Medical Decision Making)     I have reviewed the nursing notes.    I have reviewed the findings, diagnosis, plan and need for follow up with the patient.  Assessment  1. Lip abscess        Plan  Orders Placed This Encounter     chlorhexidine (PERIDEX) 0.12 % solution     Abscess popped with pressure, resolved  Peridex solution for mouth rinses x 5 days  Follow up as needed      Discharge Medication List as of 4/14/2018  3:22 PM      START taking these medications    Details   chlorhexidine (PERIDEX) 0.12 % solution Swish and spit 15 mLs in mouth 2 times daily for 5 days, Disp-150 mL, R-0, E-Prescribe             Final diagnoses:   Lip abscess       4/14/2018 , t  [facility]     Joseline Vela NP  04/16/18 0849

## 2018-04-14 NOTE — ED AVS SNAPSHOT
[facility]    [address] [address]    Phone:  [phone]                                       Norma Banerjee   MRN: [id number]    Department:  [facility]   Date of Visit:  4/14/2018           After Visit Summary Signature Page     I have received my discharge instructions, and my questions have been answered. I have discussed any challenges I see with this plan with the nurse or doctor.    ..........................................................................................................................................  Patient/Patient Representative Signature      ..........................................................................................................................................  Patient Representative Print Name and Relationship to Patient    ..................................................               ................................................  Date                                            Time    ..........................................................................................................................................  Reviewed by Signature/Title    ...................................................              ..............................................  Date                                                            Time

## 2018-04-14 NOTE — ED AVS SNAPSHOT
[facility]    [address] [address]    Phone:  [phone]                                       Norma Banerjee   MRN: [id number]    Department:  [facility]   Date of Visit:  4/14/2018           Patient Information     Date Of Birth          1/1/1930        Your diagnoses for this visit were:     Lip abscess        You were seen by Joseline Vela NP.      Follow-up Information     Follow up with [facility].    Specialty:  EMERGENCY MEDICINE    Why:  As needed, If symptoms worsen, or concerns develop    Contact information:    [address] [address] [phone]    Additional information:    From [facility]: Take [address]. Turn left at [address]. Turn left at the first stoplight on [address]. At the first stop sign, take a right onto [address]. Take a left into the parking lot and continue through until you reach the North enterance of the building.       From [place]: Take [address]. Take the [address] ramp towards [place]. Turn left onto [address]. Take a slight right onto [address]. Turn left at the first stoplight on [address]. At the first stop sign, take a right onto [address]. Take a left into the parking lot and continue through until you reach the North enterance of the building.       From [place]: Take [address]. Take a right at [address]. At the first stop sign, take a right onto [address]. Take a left into the parking lot and continue through until you reach the North enterance of the building.         Follow up with Carol Foreman MD.    Specialty:  Family Practice    Why:  As needed, if symptoms do not improve    Contact information:    [address]  [facility] [address]  [phone]        Discharge References/Attachments     ABSCESS, INCISION AND DRAINAGE (ENGLISH)      Your next 10  appointments already scheduled     Apr 18, 2018  9:45 AM CDT   (Arrive by 9:30 AM)   Post Op with Renetta Hayes PA-C   [facility] [place] ([facility] [place] )    [address]  [place] [address]   [phone]                 Review of your medicines      START taking        Dose / Directions Last dose taken    chlorhexidine 0.12 % solution   Commonly known as:  PERIDEX   Dose:  15 mL   Quantity:  150 mL        Swish and spit 15 mLs in mouth 2 times daily for 5 days   Refills:  0          Our records show that you are taking the medicines listed below. If these are incorrect, please call your family doctor or clinic.        Dose / Directions Last dose taken    aspirin 81 MG tablet   Dose:  81 mg        Take 1 tablet (81 mg) by mouth daily   Refills:  OTC        TYLENOL PO        Take by mouth At Bedtime   Refills:  0                Prescriptions were sent or printed at these locations (1 Prescription)                   [id number] - [place] - [address]   [address] [place] [address]    Telephone:  [phone]   Fax:  [phone]   Hours:                  E-Prescribed (1 of 1)         chlorhexidine (PERIDEX) 0.12 % solution                Orders Needing Specimen Collection     None      Pending Results     No orders found from 4/12/2018 to 4/15/2018.            Pending Culture Results     No orders found from 4/12/2018 to 4/15/2018.            Thank you for choosing [facility]       Thank you for choosing [facility] for your care. Our goal is always to provide you with excellent care. Hearing back from our patients is one way we can continue to improve our services. Please take a few minutes to complete the written survey that you may receive in the mail after you visit with us. Thank you!        [employer] Information     [employer] gives you secure access to your electronic health record. If you see a primary care provider, you can also send messages to your care team and  make appointments. If you have questions, please call your primary care clinic.  If you do not have a primary care provider, please call [phone] and they will assist you.        Care EveryWhere ID     This is your Care EveryWhere ID. This could be used by other organizations to access your [place] medical records  [id number]        Equal Access to Services     YOUNG JAMIL : Jake Saleh, mars thomas, nii bro . So [facility] [phone].    ATENCIÓN: Si habla español, tiene a oliva disposición servicios gratuitos de asistencia lingüística. Llame al [phone].    We comply with applicable federal civil rights laws and [place] laws. We do not discriminate on the basis of race, color, national origin, age, disability, sex, sexual orientation, or gender identity.            After Visit Summary       This is your record. Keep this with you and show to your community pharmacist(s) and doctor(s) at your next visit.

## 2018-04-16 ASSESSMENT — ENCOUNTER SYMPTOMS
FEVER: 0
APPETITE CHANGE: 0
FATIGUE: 0
CHILLS: 0

## 2018-04-18 ENCOUNTER — OFFICE VISIT (OUTPATIENT)
Dept: OTOLARYNGOLOGY | Facility: OTHER | Age: 83
End: 2018-04-18
Attending: PHYSICIAN ASSISTANT
Payer: MEDICARE

## 2018-04-18 VITALS
HEART RATE: 75 BPM | SYSTOLIC BLOOD PRESSURE: 134 MMHG | OXYGEN SATURATION: 96 % | DIASTOLIC BLOOD PRESSURE: 78 MMHG | TEMPERATURE: 98 F | WEIGHT: 185 LBS | BODY MASS INDEX: 34.04 KG/M2 | HEIGHT: 62 IN

## 2018-04-18 DIAGNOSIS — Z09 POSTOPERATIVE EXAMINATION: Primary | ICD-10-CM

## 2018-04-18 DIAGNOSIS — L98.9 SKIN LESION: ICD-10-CM

## 2018-04-18 PROCEDURE — 99024 POSTOP FOLLOW-UP VISIT: CPT | Performed by: PHYSICIAN ASSISTANT

## 2018-04-18 PROCEDURE — G0463 HOSPITAL OUTPT CLINIC VISIT: HCPCS

## 2018-04-18 ASSESSMENT — PAIN SCALES - GENERAL: PAINLEVEL: NO PAIN (0)

## 2018-04-18 NOTE — PATIENT INSTRUCTIONS
Doing well.   Continue with massage/ Vitamin E  Return to ENT as needed    Thank you for allowing [facility] and our ENT team to participate in your care.  If your medications are too expensive, please give the nurse a call.  We can possibly change this medication.  If you have a scheduling or an appointment question please contact [facility] Health Unit Coordinator at their direct line [phone].   ALL nursing questions or concerns can be directed to your ENT nurse at: [phone] Shania

## 2018-04-18 NOTE — NURSING NOTE
"Chief Complaint   Patient presents with     Surgical Followup     S/P Excision Left Lower Lip Lesion with Flap Repair on 3/23/18 at [facility]       Initial /78 (Cuff Size: Adult Regular)  Pulse 75  Temp 98  F (36.7  C) (Tympanic)  Ht 5' 2\" (1.575 m)  Wt 185 lb (83.9 kg)  SpO2 96%  BMI 33.84 kg/m2 Estimated body mass index is 33.84 kg/(m^2) as calculated from the following:    Height as of this encounter: 5' 2\" (1.575 m).    Weight as of this encounter: 185 lb (83.9 kg).  Medication Reconciliation: lisset Cortés      "

## 2018-04-18 NOTE — LETTER
4/18/2018         RE: Norma Banerjee  [address]  [place] [address]        Dear Colleague,    Thank you for referring your patient, Norma Banerjee, to the [facility]. Please see a copy of my visit note below.    Chief Complaint   Patient presents with     Surgical Followup     S/P Excision Left Lower Lip Lesion with Flap Repair on 3/23/18 at [facility]         Norma returns to ENT for f/u regarding lip lesions. She was last seen in ENT on 3/30/18 for suture removal and postoperative exam. Normal postoperative findings and site of excision was healing well. She had 2 dissolvable sutures remaining at that visit, which generally extrude on their own.   She did have Vicryl sutures placed during her surgery as well.   Recently on 4/14/18 she presented to  and was seen by Albina Vela NP for concern for lip abscess. She was able to drain a white spot on his lip. There was concern for possible retained suture. She felt small part of stitch came out.   She was started on Peridex rinse and recommended ENT f/u  She does feel symptoms are resolved.   No fevers. No drainage known.   No edema.   Norma does have continued facial paresthesia along her excision site.       Past Medical History:   Diagnosis Date     Carpal tunnel syndrome 02/14/2011     Dermoid cyst of ovary 03/05/2008    removed     Gout 09/26/2011     Osteoarthritis 10/01/2012     Recurrent UTI 09/26/2011     Urinary incontinence 11/16/2006     Weakness of both legs 09/26/2011        Allergies   Allergen Reactions     Alendronate Sodium      Hot tolerance  Fosamax       Nitrofurantoin      Chill  Fever  Chest pain  Macrobid       Nitrofurantoin Macrocrystal      Chills   Fever  Chest pain  Macrobid       Propoxyphene Napsylate      Darvocet-N 100       Sulfamethoxazole Rash     Bactrim DS     Trimethoprim Rash     Bactrim DS       Current Outpatient Prescriptions   Medication     Acetaminophen (TYLENOL PO)  "    aspirin 81 MG tablet     chlorhexidine (PERIDEX) 0.12 % solution     No current facility-administered medications for this visit.       ROS: 10 point ROS neg other than the symptoms noted above in the HPI.  /78 (Cuff Size: Adult Regular)  Pulse 75  Temp 98  F (36.7  C) (Tympanic)  Ht 5' 2\" (1.575 m)  Wt 185 lb (83.9 kg)  SpO2 96%  BMI 33.84 kg/m2  General - The patient is well nourished and well developed, and appears to have good nutritional status.  Alert and oriented to person and place, answers questions and cooperates with examination appropriately.   Head and Face - Normocephalic and atraumatic, with no gross asymmetry noted.  The facial nerve is intact, with strong symmetric movements.  Skin- No sutures. Site fully healed. NO visualized sutures.  No seroma. Mild edema remaining.   This does not involve the mucosal lip bimanual palpation is negative for any deep mass within the lower lip  Voice and Breathing - The patient was breathing comfortably without the use of accessory muscles. There was no wheezing, stridor, or stertor.  The patients voice was clear and strong, and had appropriate pitch and quality..  Eyes - Extraocular movements intact, and the pupils were reactive to light.  Sclera were not icteric or injected, conjunctiva were pink and moist.   Neck - Normal midline excursion of the laryngotracheal complex during swallowing.  Full range of motion on passive movement.  Palpation of the occipital, submental, submandibular, internal jugular chain, and supraclavicular nodes did not demonstrate any abnormal lymph nodes or masses.  Palpation of the thyroid was soft and smooth, with no nodules or goiter appreciated.  The trachea was mobile and midline.  Ears-  Bilateral TM's are intact without effusion or retraction.       ASSESSMENT:    ICD-10-CM    1. Postoperative examination Z09    2. Skin lesion L98.9     S/p excision left lower lip lesion with flap repair     Doing well. Continue with " wound cares.   Return to ENT as needed      Renetta Hayes PA-C  ENT  [facility], [place]  [phone]      Again, thank you for allowing me to participate in the care of your patient.        Sincerely,        Renetta Hayes PA-C

## 2018-04-18 NOTE — MR AVS SNAPSHOT
After Visit Summary   4/18/2018    Norma Banereje    MRN: [id number]           Patient Information     Date Of Birth          1/1/1930        Visit Information        Provider Department      4/18/2018 9:45 AM Renetta Hayes PA-C [facility] [place]        Today's Diagnoses     Postoperative examination    -  1    Skin lesion          Care Instructions    Doing well.   Continue with massage/ Vitamin E  Return to ENT as needed    Thank you for allowing CORAL Wlison and our ENT team to participate in your care.  If your medications are too expensive, please give the nurse a call.  We can possibly change this medication.  If you have a scheduling or an appointment question please contact [facility] Unit Coordinator at their direct line [phone].   ALL nursing questions or concerns can be directed to your ENT nurse at: [phone] Shania              Follow-ups after your visit        Follow-up notes from your care team     Return if symptoms worsen or fail to improve.      Your next 10 appointments already scheduled     Apr 18, 2018  9:45 AM CDT   (Arrive by 9:30 AM)   Post Op with KRISTOF[facility] [place] [facility] ([facility] )    [address]  [place] [address]   [phone]              Who to contact     If you have questions or need follow up information about today's clinic visit or your schedule please contact [facility] directly at [phone].  Normal or non-critical lab and imaging results will be communicated to you by MyChart, letter or phone within 4 business days after the clinic has received the results. If you do not hear from us within 7 days, please contact the clinic through MyChart or phone. If you have a critical or abnormal lab result, we will notify you by phone as soon as possible.  Submit refill requests through [employer] or call your pharmacy and they will forward the refill request to us. Please allow 3 business  "days for your refill to be completed.          Additional Information About Your Visit        MyChart Information     [employer] gives you secure access to your electronic health record. If you see a primary care provider, you can also send messages to your care team and make appointments. If you have questions, please call your primary care clinic.  If you do not have a primary care provider, please call [phone] and they will assist you.        Care EveryWhere ID     This is your Care EveryWhere ID. This could be used by other organizations to access your [facility] medical records  [id number]        Your Vitals Were     Pulse Temperature Height Pulse Oximetry BMI (Body Mass Index)       75 98  F (36.7  C) (Tympanic) 5' 2\" (1.575 m) 96% 33.84 kg/m2        Blood Pressure from Last 3 Encounters:   04/18/18 134/78   04/14/18 159/81   03/30/18 134/76    Weight from Last 3 Encounters:   04/18/18 185 lb (83.9 kg)   03/30/18 185 lb (83.9 kg)   03/20/18 185 lb (83.9 kg)              Today, you had the following     No orders found for display       Primary Care Provider Office Phone # Fax #    Carol Foreman [phone]       [facility] [place] [address]        Equal Access to Services     YOUNG JAMIL AH: Hadii arnoldo ku hadasho Soomaali, waaxda luqadaha, qaybta kaalmada adeegyada, nii rivera hayyudith veronica . So [facility] [phone].    ATENCIÓN: Si habla español, tiene a oliva disposición servicios gratuitos de asistencia lingüística. Llame al [phone].    We comply with applicable federal civil rights laws and [place] laws. We do not discriminate on the basis of race, color, national origin, age, disability, sex, sexual orientation, or gender identity.            Thank you!     Thank you for choosing [facility]  for your care. Our goal is always to provide you with excellent care. Hearing back from our patients is one way we can continue to improve our services. " Please take a few minutes to complete the written survey that you may receive in the mail after your visit with us. Thank you!             Your Updated Medication List - Protect others around you: Learn how to safely use, store and throw away your medicines at www.disposemymeds.org.          This list is accurate as of 4/18/18  9:36 AM.  Always use your most recent med list.                   Brand Name Dispense Instructions for use Diagnosis    aspirin 81 MG tablet      Take 1 tablet (81 mg) by mouth daily        chlorhexidine 0.12 % solution    PERIDEX    150 mL    Swish and spit 15 mLs in mouth 2 times daily for 5 days        TYLENOL PO      Take by mouth At Bedtime

## 2018-04-18 NOTE — PROGRESS NOTES
Chief Complaint   Patient presents with     Surgical Followup     S/P Excision Left Lower Lip Lesion with Flap Repair on 3/23/18 at [facility]         Norma returns to ENT for f/u regarding lip lesions. She was last seen in ENT on 3/30/18 for suture removal and postoperative exam. Normal postoperative findings and site of excision was healing well. She had 2 dissolvable sutures remaining at that visit, which generally extrude on their own.   She did have Vicryl sutures placed during her surgery as well.   Recently on 4/14/18 she presented to  and was seen by Albina Vela NP for concern for lip abscess. She was able to drain a white spot on his lip. There was concern for possible retained suture. She felt small part of stitch came out.   She was started on Peridex rinse and recommended ENT f/u  She does feel symptoms are resolved.   No fevers. No drainage known.   No edema.   Norma does have continued facial paresthesia along her excision site.       Past Medical History:   Diagnosis Date     Carpal tunnel syndrome 02/14/2011     Dermoid cyst of ovary 03/05/2008    removed     Gout 09/26/2011     Osteoarthritis 10/01/2012     Recurrent UTI 09/26/2011     Urinary incontinence 11/16/2006     Weakness of both legs 09/26/2011        Allergies   Allergen Reactions     Alendronate Sodium      Hot tolerance  Fosamax       Nitrofurantoin      Chill  Fever  Chest pain  Macrobid       Nitrofurantoin Macrocrystal      Chills   Fever  Chest pain  Macrobid       Propoxyphene Napsylate      Darvocet-N 100       Sulfamethoxazole Rash     Bactrim DS     Trimethoprim Rash     Bactrim DS       Current Outpatient Prescriptions   Medication     Acetaminophen (TYLENOL PO)     aspirin 81 MG tablet     chlorhexidine (PERIDEX) 0.12 % solution     No current facility-administered medications for this visit.       ROS: 10 point ROS neg other than the symptoms noted above in the HPI.  /78 (Cuff Size: Adult Regular)   "Pulse 75  Temp 98  F (36.7  C) (Tympanic)  Ht 5' 2\" (1.575 m)  Wt 185 lb (83.9 kg)  SpO2 96%  BMI 33.84 kg/m2  General - The patient is well nourished and well developed, and appears to have good nutritional status.  Alert and oriented to person and place, answers questions and cooperates with examination appropriately.   Head and Face - Normocephalic and atraumatic, with no gross asymmetry noted.  The facial nerve is intact, with strong symmetric movements.  Skin- No sutures. Site fully healed. NO visualized sutures.  No seroma. Mild edema remaining.   This does not involve the mucosal lip bimanual palpation is negative for any deep mass within the lower lip  Voice and Breathing - The patient was breathing comfortably without the use of accessory muscles. There was no wheezing, stridor, or stertor.  The patients voice was clear and strong, and had appropriate pitch and quality..  Eyes - Extraocular movements intact, and the pupils were reactive to light.  Sclera were not icteric or injected, conjunctiva were pink and moist.   Neck - Normal midline excursion of the laryngotracheal complex during swallowing.  Full range of motion on passive movement.  Palpation of the occipital, submental, submandibular, internal jugular chain, and supraclavicular nodes did not demonstrate any abnormal lymph nodes or masses.  Palpation of the thyroid was soft and smooth, with no nodules or goiter appreciated.  The trachea was mobile and midline.  Ears-  Bilateral TM's are intact without effusion or retraction.       ASSESSMENT:    ICD-10-CM    1. Postoperative examination Z09    2. Skin lesion L98.9     S/p excision left lower lip lesion with flap repair     Doing well. Continue with wound cares.   Return to ENT as needed      Renetta Hayes PA-C  ENT  [facility], [place]  [phone]    "

## 2018-08-16 ENCOUNTER — RADIANT APPOINTMENT (OUTPATIENT)
Dept: MAMMOGRAPHY | Facility: OTHER | Age: 83
End: 2018-08-16
Attending: FAMILY MEDICINE
Payer: MEDICARE

## 2018-08-16 DIAGNOSIS — Z12.31 VISIT FOR SCREENING MAMMOGRAM: ICD-10-CM

## 2018-08-16 PROCEDURE — 77067 SCR MAMMO BI INCL CAD: CPT | Mod: TC

## 2018-11-19 ENCOUNTER — APPOINTMENT (OUTPATIENT)
Dept: GENERAL RADIOLOGY | Facility: HOSPITAL | Age: 83
End: 2018-11-19
Attending: EMERGENCY MEDICINE
Payer: MEDICARE

## 2018-11-19 ENCOUNTER — TELEPHONE (OUTPATIENT)
Dept: FAMILY MEDICINE | Facility: OTHER | Age: 83
End: 2018-11-19

## 2018-11-19 ENCOUNTER — HOSPITAL ENCOUNTER (EMERGENCY)
Facility: HOSPITAL | Age: 83
Discharge: HOME | End: 2018-11-19
Attending: EMERGENCY MEDICINE | Admitting: EMERGENCY MEDICINE
Payer: MEDICARE

## 2018-11-19 VITALS
OXYGEN SATURATION: 96 % | TEMPERATURE: 97.6 F | SYSTOLIC BLOOD PRESSURE: 117 MMHG | RESPIRATION RATE: 16 BRPM | DIASTOLIC BLOOD PRESSURE: 106 MMHG | HEART RATE: 87 BPM

## 2018-11-19 DIAGNOSIS — R07.89 ATYPICAL CHEST PAIN: ICD-10-CM

## 2018-11-19 LAB
ALBUMIN SERPL-MCNC: 3.8 G/DL (ref 3.4–5)
ALP SERPL-CCNC: 89 U/L (ref 40–150)
ALT SERPL W P-5'-P-CCNC: 19 U/L (ref 0–50)
ANION GAP SERPL CALCULATED.3IONS-SCNC: 4 MMOL/L (ref 3–14)
AST SERPL W P-5'-P-CCNC: 19 U/L (ref 0–45)
BASOPHILS # BLD AUTO: 0 10E9/L (ref 0–0.2)
BASOPHILS NFR BLD AUTO: 0.6 %
BILIRUB SERPL-MCNC: 0.3 MG/DL (ref 0.2–1.3)
BUN SERPL-MCNC: 12 MG/DL (ref 7–30)
CALCIUM SERPL-MCNC: 9.1 MG/DL (ref 8.5–10.1)
CHLORIDE SERPL-SCNC: 106 MMOL/L (ref 94–109)
CO2 SERPL-SCNC: 26 MMOL/L (ref 20–32)
CREAT SERPL-MCNC: 0.78 MG/DL (ref 0.52–1.04)
CRP SERPL-MCNC: 3.5 MG/L (ref 0–8)
D DIMER PPP DDU-MCNC: <200 NG/ML D-DU (ref 0–300)
DIFFERENTIAL METHOD BLD: NORMAL
EOSINOPHIL # BLD AUTO: 0.2 10E9/L (ref 0–0.7)
EOSINOPHIL NFR BLD AUTO: 3.5 %
ERYTHROCYTE [DISTWIDTH] IN BLOOD BY AUTOMATED COUNT: 12.7 % (ref 10–15)
ERYTHROCYTE [SEDIMENTATION RATE] IN BLOOD BY WESTERGREN METHOD: 26 MM/H (ref 0–30)
GFR SERPL CREATININE-BSD FRML MDRD: 69 ML/MIN/1.7M2
GLUCOSE SERPL-MCNC: 84 MG/DL (ref 70–99)
HCT VFR BLD AUTO: 38.4 % (ref 35–47)
HGB BLD-MCNC: 12.9 G/DL (ref 11.7–15.7)
IMM GRANULOCYTES # BLD: 0 10E9/L (ref 0–0.4)
IMM GRANULOCYTES NFR BLD: 0.2 %
LIPASE SERPL-CCNC: 99 U/L (ref 73–393)
LYMPHOCYTES # BLD AUTO: 1.6 10E9/L (ref 0.8–5.3)
LYMPHOCYTES NFR BLD AUTO: 24.4 %
MAGNESIUM SERPL-MCNC: 2.3 MG/DL (ref 1.6–2.3)
MCH RBC QN AUTO: 31 PG (ref 26.5–33)
MCHC RBC AUTO-ENTMCNC: 33.6 G/DL (ref 31.5–36.5)
MCV RBC AUTO: 92 FL (ref 78–100)
MONOCYTES # BLD AUTO: 0.7 10E9/L (ref 0–1.3)
MONOCYTES NFR BLD AUTO: 10 %
NEUTROPHILS # BLD AUTO: 4 10E9/L (ref 1.6–8.3)
NEUTROPHILS NFR BLD AUTO: 61.3 %
NRBC # BLD AUTO: 0 10*3/UL
NRBC BLD AUTO-RTO: 0 /100
NT-PROBNP SERPL-MCNC: 339 PG/ML (ref 0–1800)
PLATELET # BLD AUTO: 179 10E9/L (ref 150–450)
POTASSIUM SERPL-SCNC: 4.4 MMOL/L (ref 3.4–5.3)
PROT SERPL-MCNC: 7.7 G/DL (ref 6.8–8.8)
RBC # BLD AUTO: 4.16 10E12/L (ref 3.8–5.2)
SODIUM SERPL-SCNC: 136 MMOL/L (ref 133–144)
TROPONIN I SERPL-MCNC: <0.015 UG/L (ref 0–0.04)
WBC # BLD AUTO: 6.5 10E9/L (ref 4–11)

## 2018-11-19 PROCEDURE — 83690 ASSAY OF LIPASE: CPT | Performed by: EMERGENCY MEDICINE

## 2018-11-19 PROCEDURE — 85379 FIBRIN DEGRADATION QUANT: CPT | Performed by: EMERGENCY MEDICINE

## 2018-11-19 PROCEDURE — 99285 EMERGENCY DEPT VISIT HI MDM: CPT | Mod: 25

## 2018-11-19 PROCEDURE — 84484 ASSAY OF TROPONIN QUANT: CPT | Performed by: EMERGENCY MEDICINE

## 2018-11-19 PROCEDURE — 99285 EMERGENCY DEPT VISIT HI MDM: CPT | Performed by: EMERGENCY MEDICINE

## 2018-11-19 PROCEDURE — 93010 ELECTROCARDIOGRAM REPORT: CPT | Performed by: INTERNAL MEDICINE

## 2018-11-19 PROCEDURE — 86140 C-REACTIVE PROTEIN: CPT | Performed by: EMERGENCY MEDICINE

## 2018-11-19 PROCEDURE — 93005 ELECTROCARDIOGRAM TRACING: CPT

## 2018-11-19 PROCEDURE — 80053 COMPREHEN METABOLIC PANEL: CPT | Performed by: EMERGENCY MEDICINE

## 2018-11-19 PROCEDURE — 85652 RBC SED RATE AUTOMATED: CPT | Performed by: EMERGENCY MEDICINE

## 2018-11-19 PROCEDURE — 85025 COMPLETE CBC W/AUTO DIFF WBC: CPT | Performed by: EMERGENCY MEDICINE

## 2018-11-19 PROCEDURE — 83880 ASSAY OF NATRIURETIC PEPTIDE: CPT | Performed by: EMERGENCY MEDICINE

## 2018-11-19 PROCEDURE — 83735 ASSAY OF MAGNESIUM: CPT | Performed by: EMERGENCY MEDICINE

## 2018-11-19 PROCEDURE — 71046 X-RAY EXAM CHEST 2 VIEWS: CPT | Mod: TC

## 2018-11-19 PROCEDURE — 36415 COLL VENOUS BLD VENIPUNCTURE: CPT | Performed by: EMERGENCY MEDICINE

## 2018-11-19 RX ORDER — LIDOCAINE 40 MG/G
CREAM TOPICAL
Status: DISCONTINUED | OUTPATIENT
Start: 2018-11-19 | End: 2018-11-19 | Stop reason: HOSPADM

## 2018-11-19 ASSESSMENT — ENCOUNTER SYMPTOMS
MYALGIAS: 1
ARTHRALGIAS: 1
WEAKNESS: 1
FATIGUE: 1
CHEST TIGHTNESS: 1

## 2018-11-19 NOTE — ED PROVIDER NOTES
History     Chief Complaint   Patient presents with     Elbow Pain     left elbow pain since yesterday, denies injury. Reports was worked up     Chest felt funny     states chest felt funny last night, denies pain or SOB. States can't really explain it but it just felt different.     HPI  Norma Banerjee is a 88 year old female with above symptoms primarily achiness in left arm last nite with some chest discomfort during the nite so she and her daughter come in for eval.  Has seen Dr. Rodriguez in the past for evaluation of chest pain. 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  No intervention recommended.  There has been no different stressors in the past few days and she cannot recall overusing her left arm or injury to her chest. Denies CHF Sx and no nausea or lightheadedness.         Problem List:    Patient Active Problem List    Diagnosis Date Noted     Abnormal stress test 12/13/2017     Priority: Medium     Atypical chest pain 12/13/2017     Priority: Medium     ACP (advance care planning) 10/31/2016     Priority: Medium     Advance Care Planning 11/14/2016: Receipt of ACP document:  Received: POLST which was signed and dated by provider on 11/3/16.  Document previously scanned on 11/8/16.  Order reviewed and found to be valid.  Code Status reflects choices in most recent ACP document.  Confirmed/documented designated decision maker(s).  Added by Melba Luu  Advance Care Planning 10/31/2016: ACP Review of Chart / Resources Provided:  Reviewed chart for advance care plan.  Norma Banerjee has no plan or code status on file. Discussed available resources and provided with information. Confirmed code status reflects current choices pending further ACP discussions.  Confirmed/documented legally designated decision makers.  Added by Mariana Richards               Incomplete tear of right rotator cuff 10/31/2016      Priority: Medium     Osteoarthritis 10/01/2012     Priority: Medium     Problem list name updated by automated process. Provider to review       Urinary incontinence 11/16/2006     Priority: Medium     Problem list name updated by automated process. Provider to review          Past Medical History:    Past Medical History:   Diagnosis Date     Carpal tunnel syndrome 02/14/2011     Dermoid cyst of ovary 03/05/2008     Gout 09/26/2011     Osteoarthritis 10/01/2012     Recurrent UTI 09/26/2011     Urinary incontinence 11/16/2006     Weakness of both legs 09/26/2011       Past Surgical History:    Past Surgical History:   Procedure Laterality Date     ARTHROSCOPY KNEE      Osteoarthritis, left     C THALLIUM STRESS TEST  2007    negative persantine thallium GXT, Chest pain     CATARACT IOL, RT/LT      bilateral cataract surgery     CHOLECYSTECTOMY      Cholecystitis     COLONOSCOPY  4/2008    free of disease     HC ECP WITH CATARACT SURGERY       JOINT REPLACEMENT      Osteoarthritis, bilateral     RELEASE CARPAL TUNNEL  2011    RT     SURGICAL PATHOLOGY EXAM      ovarian mass, laparotomy       Family History:    Family History   Problem Relation Age of Onset     Alzheimer Disease Mother 92     cause of death     Cancer Mother      uterine     C.A.D. Father      Cerebrovascular Disease Father 72     cva, cause of death     Diabetes Sister      Hypertension Sister      Hypertension Sister      Hypertension Sister        Social History:  Marital Status:  Single [1]  Social History   Substance Use Topics     Smoking status: Former Smoker     Types: Cigarettes     Smokeless tobacco: Never Used      Comment: tried to quit yes, passive exposure no     Alcohol use No        Medications:      Acetaminophen (TYLENOL PO)   aspirin 81 MG tablet     Review of Systems   Constitutional: Positive for fatigue.   Respiratory: Positive for chest tightness.    Cardiovascular: Positive for chest pain.   Musculoskeletal: Positive for  arthralgias and myalgias.   Neurological: Positive for weakness.   All other systems reviewed and are negative.    Physical Exam   BP: (!) 191/96  Pulse: 63  Heart Rate: 59  Temp: 96.7  F (35.9  C)  Resp: 18  SpO2: 99 %  Physical Exam   Constitutional: She is oriented to person, place, and time. She appears well-developed and well-nourished. No distress.   Pleasant self-effacing woman who apologizes for taking up our time.     HENT:   Head: Normocephalic and atraumatic.   Eyes: Conjunctivae and EOM are normal. Pupils are equal, round, and reactive to light.   Neck: Normal range of motion. Neck supple.   Cardiovascular: Normal rate.    Pulmonary/Chest: Effort normal and breath sounds normal. No respiratory distress. She has no wheezes. She has no rales.   Abdominal: Soft. Bowel sounds are normal. She exhibits no distension. There is no tenderness.   Musculoskeletal: Normal range of motion. She exhibits no edema.   Neurological: She is alert and oriented to person, place, and time.   Skin: Skin is warm and dry. She is not diaphoretic.     ED Course     ED Course     Procedures  ECG  Sinus bradycardia with marked sinus arrhythmia, otherwise normal ECG, QTc 398ms     Critical Care time:  none    Results for orders placed or performed during the hospital encounter of 11/19/18 (from the past 24 hour(s))   CBC with platelets differential   Result Value Ref Range    WBC 6.5 4.0 - 11.0 10e9/L    RBC Count 4.16 3.8 - 5.2 10e12/L    Hemoglobin 12.9 11.7 - 15.7 g/dL    Hematocrit 38.4 35.0 - 47.0 %    MCV 92 78 - 100 fl    MCH 31.0 26.5 - 33.0 pg    MCHC 33.6 31.5 - 36.5 g/dL    RDW 12.7 10.0 - 15.0 %    Platelet Count 179 150 - 450 10e9/L    Diff Method Automated Method     % Neutrophils 61.3 %    % Lymphocytes 24.4 %    % Monocytes 10.0 %    % Eosinophils 3.5 %    % Basophils 0.6 %    % Immature Granulocytes 0.2 %    Nucleated RBCs 0 0 /100    Absolute Neutrophil 4.0 1.6 - 8.3 10e9/L    Absolute Lymphocytes 1.6 0.8 - 5.3  10e9/L    Absolute Monocytes 0.7 0.0 - 1.3 10e9/L    Absolute Eosinophils 0.2 0.0 - 0.7 10e9/L    Absolute Basophils 0.0 0.0 - 0.2 10e9/L    Abs Immature Granulocytes 0.0 0 - 0.4 10e9/L    Absolute Nucleated RBC 0.0    Erythrocyte sedimentation rate auto   Result Value Ref Range    Sed Rate 26 0 - 30 mm/h   CRP inflammation   Result Value Ref Range    CRP Inflammation 3.5 0.0 - 8.0 mg/L   D-Dimer ([facility])   Result Value Ref Range    D-Dimer ng/mL <200 0 - 300 ng/ml D-DU   Comprehensive metabolic panel   Result Value Ref Range    Sodium 136 133 - 144 mmol/L    Potassium 4.4 3.4 - 5.3 mmol/L    Chloride 106 94 - 109 mmol/L    Carbon Dioxide 26 20 - 32 mmol/L    Anion Gap 4 3 - 14 mmol/L    Glucose 84 70 - 99 mg/dL    Urea Nitrogen 12 7 - 30 mg/dL    Creatinine 0.78 0.52 - 1.04 mg/dL    GFR Estimate 69 >60 mL/min/1.7m2    GFR Estimate If Black 84 >60 mL/min/1.7m2    Calcium 9.1 8.5 - 10.1 mg/dL    Bilirubin Total 0.3 0.2 - 1.3 mg/dL    Albumin 3.8 3.4 - 5.0 g/dL    Protein Total 7.7 6.8 - 8.8 g/dL    Alkaline Phosphatase 89 40 - 150 U/L    ALT 19 0 - 50 U/L    AST 19 0 - 45 U/L   Magnesium   Result Value Ref Range    Magnesium 2.3 1.6 - 2.3 mg/dL   Lipase   Result Value Ref Range    Lipase 99 73 - 393 U/L   Troponin I   Result Value Ref Range    Troponin I ES <0.015 0.000 - 0.045 ug/L   Nt probnp inpatient (BNP)   Result Value Ref Range    N-Terminal Pro BNP Inpatient 339 0 - 1800 pg/mL   XR Chest 2 Views    Narrative    PROCEDURE: XR CHEST 2 VW 11/19/2018 1:33 PM    HISTORY: chest pain;     COMPARISONS: 11/15/2017.    TECHNIQUE: 2 views.    FINDINGS: Heart and pulmonary vasculature are normal. No acute  infiltrate or effusion is seen. There is some flattening of the  diaphragm. Small nodular densities in the right mid lung are stable.    There is mild degenerative change in the spine.         Impression    IMPRESSION: No acute disease.    YAMILE CRAWFORD MD       Medications   lidocaine 1 % 1 mL (not administered)    lidocaine (LMX4) kit (not administered)   sodium chloride (PF) 0.9% PF flush 3 mL (not administered)   sodium chloride (PF) 0.9% PF flush 3 mL (not administered)       Assessments & Plan (with Medical Decision Making)   Norma had somewhat vague left arm ache as well as some precordial chest discomfort.  Labs were drawn and IV placed.  Labs were wnl as well as ECG noted above and CXR.  She may very well have had some angina without infarction in keeping with the interpretation of her lexiscan from 1 year ago but she is not interested in any meds such as isordil or the like which her daughter confirms as will. Is scheduled to see her FP Loree Foreman in the next couple weeks and this can be revisited if this achiness becomes repetitive.  Discharged back to the [address] ap't.    I have reviewed the nursing notes.    I have reviewed the findings, diagnosis, plan and need for follow up with the patient.  New Prescriptions    No medications on file       Final diagnoses:   Atypical chest pain       11/19/2018   [facility]     Graeme Pimentel MD  11/19/18 [id number]

## 2018-11-19 NOTE — ED TRIAGE NOTES
"C/o left elbow and arm pain that started yesterday and was rated 8/10 at that time. Denies actual elbow pain now. States \"chest felt funny\" last night also. Denies any chest pain. Denies SOB. States \"it just felt different, I don't know how to explain it.\" Denies any pain at this time.   "

## 2018-11-19 NOTE — TELEPHONE ENCOUNTER
Patient called with left elbow pain that radiates all over left arm. Patient is not experiencing shortness of breath, chest pain or nausea.  Patients pain is 8/10.  Has taken tylenol with no relieve.  Patient states there has been no trauma to the arm. Patient would like to be seen by Dr. Medina.    Please advise if there is somewhere patient can be worked into schedule.  If not able to be seen by provider nurse will advise patient to be seen by another provider tomorrow or be seen in Urgent Care.    Patient number  [phone]

## 2018-11-19 NOTE — TELEPHONE ENCOUNTER
Please schedule patient for date/time: unable to work in today as we are at full capacity, may go to urgent care or see another provider.     Have patient go to ER/Urgent Care Center. Urgent Care hours are 9:30 am to 8 pm, open 7 days a week. Yes.    Provider will call patient.No.    Other:

## 2018-11-19 NOTE — DISCHARGE INSTRUCTIONS
Uncertain Causes of Chest Pain    Chest pain can happen for a number of reasons. Sometimes the cause can't be determined. If your condition does not seem serious, and your pain does not appear to be coming from your heart, your healthcare provider may recommend watching it closely. Sometimes the signs of a serious problem take more time to appear. Many problems not related to your heart can cause chest pain. These include:    Musculoskeletal. Costochondritis is an inflammation of the tissues around the ribs that can occur from trauma or overuse injuries, or a strain of the muscles of the chest wall    Respiratory. Pneumonia, collapsed lung (pneumothorax), or inflammation of the lining of the chest and lungs (pleurisy)    Gastrointestinal. Esophageal reflux, heartburn, ulcers, or gallbladder disease    Anxiety and panic disorders    Nerve compression and inflammation    Rare miscellaneous problems such as aortic aneurysm (a swelling of the large artery coming out of the heart) or pulmonary embolism (a blood clot in the lungs)  Home care  After your visit, follow these recommendations:    Rest today and avoid strenuous activity.    Take any prescribed medicine as directed.    Be aware of any recurrent chest pain and notice any changes  Follow-up care  Follow up with your healthcare provider if you do not start to feel better within 24 hours, or as advised.  Call 911  Call 911 if any of these occur:    A change in the type of pain: if it feels different, becomes more severe, lasts longer, or begins to spread into your shoulder, arm, neck, jaw or back    Shortness of breath or increased pain with breathing    Weakness, dizziness, or fainting    Rapid heart beat    Crushing sensation in your chest   When to seek medical advice  Call your healthcare provider right away if any of the following occur:    Cough with dark colored sputum (phlegm) or blood    Fever of 100.4 F (38 C) or higher, or as directed by your  healthcare provider    Swelling, pain or redness in one leg  Date Last Reviewed: 5/1/2018 2000-2018 [employer]. [address] [place] [address], [place] [address]. All rights reserved. This information is not intended as a substitute for professional medical care. Always follow your healthcare professional's instructions.      Norma,   Your low grade chest and left arm pain recurring over the last 12 hours is hard to characterize as cardiac but we tested for cardiac and lung etiologies which were all reassuringly normal.  You have had a nice evaluation by Dr. Rodriguez in the past year, so if this type of pain would keep bothering you, it would be worthwhile to followup with him or Carol Foreman, who knows you the best over time.  You are a ashok lady to be doing as well and independently as you are and have a dependable daughter so nearby.  Have a good Thanksgiving.  Good luck!

## 2018-11-19 NOTE — ED AVS SNAPSHOT
[facility]    [address] [address]    Phone:  [phone]                                       Norma Banerjee   MRN: [id number]    Department:  [facility]   Date of Visit:  11/19/2018           Patient Information     Date Of Birth          1/1/1930        Your diagnoses for this visit were:     Atypical chest pain        You were seen by Graeme Pimentel MD.      Follow-up Information     Follow up with Carol Foreman MD.    Specialty:  Family Practice    Why:  As needed    Contact information:    [address] [place] [id number]  [phone]          Discharge Instructions         Uncertain Causes of Chest Pain    Chest pain can happen for a number of reasons. Sometimes the cause can't be determined. If your condition does not seem serious, and your pain does not appear to be coming from your heart, your healthcare provider may recommend watching it closely. Sometimes the signs of a serious problem take more time to appear. Many problems not related to your heart can cause chest pain. These include:    Musculoskeletal. Costochondritis is an inflammation of the tissues around the ribs that can occur from trauma or overuse injuries, or a strain of the muscles of the chest wall    Respiratory. Pneumonia, collapsed lung (pneumothorax), or inflammation of the lining of the chest and lungs (pleurisy)    Gastrointestinal. Esophageal reflux, heartburn, ulcers, or gallbladder disease    Anxiety and panic disorders    Nerve compression and inflammation    Rare miscellaneous problems such as aortic aneurysm (a swelling of the large artery coming out of the heart) or pulmonary embolism (a blood clot in the lungs)  Home care  After your visit, follow these recommendations:    Rest today and avoid strenuous activity.    Take any prescribed medicine as directed.    Be aware of any recurrent chest pain and notice any changes  Follow-up care  Follow up with your  healthcare provider if you do not start to feel better within 24 hours, or as advised.  Call 911  Call 911 if any of these occur:    A change in the type of pain: if it feels different, becomes more severe, lasts longer, or begins to spread into your shoulder, arm, neck, jaw or back    Shortness of breath or increased pain with breathing    Weakness, dizziness, or fainting    Rapid heart beat    Crushing sensation in your chest   When to seek medical advice  Call your healthcare provider right away if any of the following occur:    Cough with dark colored sputum (phlegm) or blood    Fever of 100.4 F (38 C) or higher, or as directed by your healthcare provider    Swelling, pain or redness in one leg  Date Last Reviewed: 5/1/2018 2000-2018 [employer]. [address] [place] [address]. All rights reserved. This information is not intended as a substitute for professional medical care. Always follow your healthcare professional's instructions.      Norma,   Your low grade chest and left arm pain recurring over the last 12 hours is hard to characterize as cardiac but we tested for cardiac and lung etiologies which were all reassuringly normal.  You have had a nice evaluation by Dr. Rodriguez in the past year, so if this type of pain would keep bothering you, it would be worthwhile to followup with him or Carol Foreman, who knows you the best over time.  You are a ashok lady to be doing as well and independently as you are and have a dependable daughter so nearby.  Have a good Thanksgiving.  Good luck!    Your next 10 appointments already scheduled     Dec 20, 2018 10:00 AM [facility]   (Arrive by 9:45 AM)   [facility] Physical Adult with Carol Foreman MD   [facility] ([facility] )    [address] [place] [address]   [phone]                 Review of your medicines      Our records show that you are taking the medicines listed below. If these are  incorrect, please call your family doctor or clinic.        Dose / Directions Last dose taken    aspirin 81 MG tablet   Dose:  81 mg        Take 1 tablet (81 mg) by mouth daily   Refills:  OTC        TYLENOL PO        Take by mouth At Bedtime   Refills:  0                Procedures and tests performed during your visit     CBC with platelets differential    CRP inflammation    Comprehensive metabolic panel    D-Dimer ([facility])    EKG 12 lead    Erythrocyte sedimentation rate auto    Lipase    Magnesium    Nt probnp inpatient (BNP)    Peripheral IV catheter    Troponin I    XR Chest 2 Views      Orders Needing Specimen Collection     None      Pending Results     No orders found from 11/17/2018 to 11/20/2018.            Pending Culture Results     No orders found from 11/17/2018 to 11/20/2018.            Thank you for choosing [facility]       Thank you for choosing [facility] for your care. Our goal is always to provide you with excellent care. Hearing back from our patients is one way we can continue to improve our services. Please take a few minutes to complete the written survey that you may receive in the mail after you visit with us. Thank you!        [employer] Information     [employer] gives you secure access to your electronic health record. If you see a primary care provider, you can also send messages to your care team and make appointments. If you have questions, please call your primary care clinic.  If you do not have a primary care provider, please call [phone] and they will assist you.        Care EveryWhere ID     This is your Care EveryWhere ID. This could be used by other organizations to access your [facility] medical records  [id number]        Equal Access to Services     YOUNG JAMIL : Jake Saleh, mars thomas, nii bro . [facility] [phone].    ATENCIÓN: Si habla español, tiene a oliva disposición servicios gratuitos de  asistencia lingüística. Fransisco al [phone].    We comply with applicable federal civil rights laws and [place] laws. We do not discriminate on the basis of race, color, national origin, age, disability, sex, sexual orientation, or gender identity.            After Visit Summary       This is your record. Keep this with you and show to your community pharmacist(s) and doctor(s) at your next visit.

## 2018-11-19 NOTE — ED TRIAGE NOTES
Patient and daughter informed to immediately report to admitting desk if patient starts to experience any chest pain, chest tightness, chest discomfort, or SOB.

## 2018-11-19 NOTE — TELEPHONE ENCOUNTER
Called patient and informed provider will not be able to fit her in to schedule today.  Informed patient that she could be scheduled with another provider tomorrow. Patient stated she will have daughter bring her to Urgent Care.

## 2018-11-19 NOTE — ED AVS SNAPSHOT
[facility]    [address] [address]    Phone:  [phone]                                       Norma Banerjee   MRN: [id number]    Department:  [facility]   Date of Visit:  11/19/2018           After Visit Summary Signature Page     I have received my discharge instructions, and my questions have been answered. I have discussed any challenges I see with this plan with the nurse or doctor.    ..........................................................................................................................................  Patient/Patient Representative Signature      ..........................................................................................................................................  Patient Representative Print Name and Relationship to Patient    ..................................................               ................................................  Date                                   Time    ..........................................................................................................................................  Reviewed by Signature/Title    ...................................................              ..............................................  Date                                               Time          22EPIC Rev 08/18

## 2018-11-19 NOTE — ED NOTES
Discharge teaching done, AVS reviewed with pt, questions answered. Pt verbalized understanding and is agreeable with discharge plan. Pt discharged to home accompanied by her daughter.

## 2018-12-18 NOTE — PROGRESS NOTES
SUBJECTIVE:   CC: Norma Banerjee is an 88 year old woman who presents for preventive health visit.     HPI  Ability to successfully perform activities of daily living: Yes, no assistance needed  Home safety:  none identified   Hearing impairment: Yes, Difficulty following a conversation in a noisy restaurant or crowded room.  Feel that people are mumbling or not speaking clearly.  Difficulty understanding soft or whispered speech.  Patient currently has HA not wearing today.            Today's PHQ-2 Score:   PHQ-2 ( 1999 [employer]) 11/13/2014   Q1: Little interest or pleasure in doing things 0   Q2: Feeling down, depressed or hopeless 0   PHQ-2 Score 0       Abuse: Current or Past(Physical, Sexual or Emotional)- No  Do you feel safe in your environment? Yes    Social History     Tobacco Use     Smoking status: Former Smoker     Types: Cigarettes     Smokeless tobacco: Never Used     Tobacco comment: tried to quit yes, passive exposure no   Substance Use Topics     Alcohol use: No     No flowsheet data found. see scanned data    Reviewed orders with patient.  Reviewed health maintenance and updated orders accordingly - Yes  BP Readings from Last 3 Encounters:   12/20/18 140/80   11/19/18 (!) 117/106   04/18/18 134/78    Wt Readings from Last 3 Encounters:   12/20/18 79.4 kg (175 lb)   04/18/18 83.9 kg (185 lb)   03/30/18 83.9 kg (185 lb)                  Patient Active Problem List   Diagnosis     Urinary incontinence     Osteoarthritis     ACP (advance care planning)     Incomplete tear of right rotator cuff     Abnormal stress test     Atypical chest pain     Past Surgical History:   Procedure Laterality Date     ARTHROSCOPY KNEE      Osteoarthritis, left     C THALLIUM STRESS TEST  2007    negative persantine thallium GXT, Chest pain     CATARACT IOL, RT/LT      bilateral cataract surgery     CHOLECYSTECTOMY      Cholecystitis     COLONOSCOPY  4/2008    free of disease     HC ECP WITH CATARACT SURGERY        JOINT REPLACEMENT      Osteoarthritis, bilateral     RELEASE CARPAL TUNNEL  2011    RT     SURGICAL PATHOLOGY EXAM      ovarian mass, laparotomy       Social History     Tobacco Use     Smoking status: Former Smoker     Types: Cigarettes     Smokeless tobacco: Never Used     Tobacco comment: tried to quit yes, passive exposure no   Substance Use Topics     Alcohol use: No     Family History   Problem Relation Age of Onset     Alzheimer Disease Mother 92        cause of death     Cancer Mother         uterine     C.A.D. Father      Cerebrovascular Disease Father 72        cva, cause of death     Diabetes Sister      Hypertension Sister      Hypertension Sister      Hypertension Sister          Current Outpatient Medications   Medication Sig Dispense Refill     Acetaminophen (TYLENOL PO) Take by mouth At Bedtime       aspirin 81 MG tablet Take 1 tablet (81 mg) by mouth daily  OTC     Allergies   Allergen Reactions     Alendronate Sodium      Hot tolerance  Fosamax       Nitrofurantoin      Chill  Fever  Chest pain  Macrobid       Nitrofurantoin Macrocrystal      Chills   Fever  Chest pain  Macrobid       Propoxyphene Napsylate      Darvocet-N 100       Sulfamethoxazole Rash     Bactrim DS     Trimethoprim Rash     Bactrim DS         Mammogram done in August    Pertinent mammograms are reviewed under the imaging tab.  History of abnormal Pap smear: NO - age 65 - see link Cervical Cytology Screening Guidelines     Reviewed and updated as needed this visit by clinical staff  Tobacco  Allergies  Meds  Med Hx  Surg Hx  Fam Hx  Soc Hx        Reviewed and updated as needed this visit by Provider        Past Medical History:   Diagnosis Date     Carpal tunnel syndrome 02/14/2011     Dermoid cyst of ovary 03/05/2008    removed     Gout 09/26/2011     Osteoarthritis 10/01/2012     Recurrent UTI 09/26/2011     Urinary incontinence 11/16/2006     Weakness of both legs 09/26/2011      Past Surgical History:   Procedure  "Laterality Date     ARTHROSCOPY KNEE      Osteoarthritis, left     C THALLIUM STRESS TEST  2007    negative persantine thallium GXT, Chest pain     CATARACT IOL, RT/LT      bilateral cataract surgery     CHOLECYSTECTOMY      Cholecystitis     COLONOSCOPY  4/2008    free of disease     HC ECP WITH CATARACT SURGERY       JOINT REPLACEMENT      Osteoarthritis, bilateral     RELEASE CARPAL TUNNEL  2011    RT     SURGICAL PATHOLOGY EXAM      ovarian mass, laparotomy     Obstetric History     No data available      She had 3 sets of twins, 10 children    Review of Systems  CONSTITUTIONAL: NEGATIVE for fever, chills, change in weight  INTEGUMENTARY/SKIN: NEGATIVE for worrisome rashes, moles or lesions  EYES: NEGATIVE for vision changes or irritation  ENT: NEGATIVE for ear, mouth and throat problems  RESP: NEGATIVE for significant cough or SOB  BREAST: NEGATIVE for masses, tenderness or discharge  CV: NEGATIVE for chest pain, palpitations or peripheral edema  GI: NEGATIVE for nausea, abdominal pain, heartburn, or change in bowel habits  : NEGATIVE for unusual urinary or vaginal symptoms. No vaginal bleeding.  MUSCULOSKELETAL: NEGATIVE for significant arthralgias or myalgia  NEURO: NEGATIVE for weakness, dizziness or paresthesias  ENDOCRINE: NEGATIVE for temperature intolerance, skin/hair changes  HEME/ALLERGY/IMMUNE: NEGATIVE for bleeding problems  PSYCHIATRIC: NEGATIVE for changes in mood or affect      OBJECTIVE:   /80 (BP Location: Left arm, Patient Position: Sitting, Cuff Size: Adult Regular)   Pulse 66   Temp 96.6  F (35.9  C) (Tympanic)   Ht 1.543 m (5' 0.75\")   Wt 79.4 kg (175 lb)   SpO2 99%   BMI 33.34 kg/m    Physical Exam  GENERAL APPEARANCE: healthy, alert and no distress  EYES: Eyes grossly normal to inspection, PERRL and conjunctivae and sclerae normal  HENT: ear canals and TM's normal, nose and mouth without ulcers or lesions, oropharynx clear and oral mucous membranes moist  NECK: no adenopathy, " no asymmetry, masses, or scars and thyroid normal to palpation  RESP: lungs clear to auscultation - no rales, rhonchi or wheezes  BREAST: normal without masses, tenderness or nipple discharge and no palpable axillary masses or adenopathy  CV: regular rate and rhythm, normal S1 S2, no S3 or S4, no murmur, click or rub, no peripheral edema and peripheral pulses strong. No bruits noted  ABDOMEN: soft, nontender, no hepatosplenomegaly, no masses and bowel sounds normal  MS: no musculoskeletal defects are noted and gait is age appropriate without ataxia  SKIN: no suspicious lesions or rashes  NEURO: Normal strength and tone, sensory exam grossly normal, mentation intact and speech normal  PSYCH: mentation appears normal and affect normal/bright    Results for orders placed or performed in visit on 12/20/18   Lipid Profile   Result Value Ref Range    Cholesterol 157 <200 mg/dL    Triglycerides 97 <150 mg/dL    HDL Cholesterol 50 >49 mg/dL    LDL Cholesterol Calculated 88 <100 mg/dL    Non HDL Cholesterol 107 <130 mg/dL         ASSESSMENT/PLAN:   1. Preventative health care  Mammogram done in August    2. Primary osteoarthritis involving multiple joints  Ongoing, she uses tylenol and is walking 4 miles every day    3. Need for prophylactic vaccination and inoculation against influenza  Given today  - Vaccine Administration, Initial [22345]    4. Vision changes  She was asked to evaluate this last year and forgot to bring in the note. She has occasional episodes of blurred vision, no headaches. Medicare will not cover an echo or carotid ultrasounds for her at this time. She has no bruits and no murmers. Will continue an aspirin daily, check lipids and refer for another eye exam as she is due and her provider is retiring  - OPHTHALMOLOGY ADULT REFERRAL    5. Lipid screening    - Lipid Profile    COUNSELING:  Reviewed preventive health counseling, as reflected in patient instructions       Regular exercise       Healthy  "diet/nutrition    BP Readings from Last 1 Encounters:   12/20/18 140/80     Estimated body mass index is 33.34 kg/m  as calculated from the following:    Height as of this encounter: 1.543 m (5' 0.75\").    Weight as of this encounter: 79.4 kg (175 lb).      Weight management plan: Discussed healthy diet and exercise guidelines     reports that she has quit smoking. Her smoking use included cigarettes. she has never used smokeless tobacco.      Counseling Resources:  ATP IV Guidelines  Pooled Cohorts Equation Calculator  Breast Cancer Risk Calculator  FRAX Risk Assessment  ICSI Preventive Guidelines  Dietary Guidelines for Americans, 2010  USDA's MyPlate  ASA Prophylaxis  Lung CA Screening    Carol Foreman MD  [facility]  "

## 2018-12-20 ENCOUNTER — OFFICE VISIT (OUTPATIENT)
Dept: FAMILY MEDICINE | Facility: OTHER | Age: 83
End: 2018-12-20
Attending: FAMILY MEDICINE
Payer: COMMERCIAL

## 2018-12-20 VITALS
HEART RATE: 66 BPM | HEIGHT: 61 IN | DIASTOLIC BLOOD PRESSURE: 80 MMHG | OXYGEN SATURATION: 99 % | TEMPERATURE: 96.6 F | BODY MASS INDEX: 33.04 KG/M2 | WEIGHT: 175 LBS | SYSTOLIC BLOOD PRESSURE: 140 MMHG

## 2018-12-20 DIAGNOSIS — H53.9 VISION CHANGES: ICD-10-CM

## 2018-12-20 DIAGNOSIS — Z13.220 LIPID SCREENING: ICD-10-CM

## 2018-12-20 DIAGNOSIS — Z23 NEED FOR PROPHYLACTIC VACCINATION AND INOCULATION AGAINST INFLUENZA: ICD-10-CM

## 2018-12-20 DIAGNOSIS — M15.0 PRIMARY OSTEOARTHRITIS INVOLVING MULTIPLE JOINTS: ICD-10-CM

## 2018-12-20 DIAGNOSIS — Z00.00 PREVENTATIVE HEALTH CARE: Primary | ICD-10-CM

## 2018-12-20 LAB
CHOLEST SERPL-MCNC: 157 MG/DL
HDLC SERPL-MCNC: 50 MG/DL
LDLC SERPL CALC-MCNC: 88 MG/DL
NONHDLC SERPL-MCNC: 107 MG/DL
TRIGL SERPL-MCNC: 97 MG/DL

## 2018-12-20 PROCEDURE — G0008 ADMIN INFLUENZA VIRUS VAC: HCPCS | Performed by: FAMILY MEDICINE

## 2018-12-20 PROCEDURE — 80061 LIPID PANEL: CPT | Mod: ZL,GZ | Performed by: FAMILY MEDICINE

## 2018-12-20 PROCEDURE — 36415 COLL VENOUS BLD VENIPUNCTURE: CPT | Mod: ZL | Performed by: FAMILY MEDICINE

## 2018-12-20 PROCEDURE — 99397 PER PM REEVAL EST PAT 65+ YR: CPT | Performed by: FAMILY MEDICINE

## 2018-12-20 PROCEDURE — 90662 IIV NO PRSV INCREASED AG IM: CPT

## 2018-12-20 ASSESSMENT — PATIENT HEALTH QUESTIONNAIRE - PHQ9
SUM OF ALL RESPONSES TO PHQ QUESTIONS 1-9: 6
5. POOR APPETITE OR OVEREATING: NOT AT ALL

## 2018-12-20 ASSESSMENT — PAIN SCALES - GENERAL: PAINLEVEL: NO PAIN (0)

## 2018-12-20 ASSESSMENT — ANXIETY QUESTIONNAIRES
GAD7 TOTAL SCORE: 0
7. FEELING AFRAID AS IF SOMETHING AWFUL MIGHT HAPPEN: NOT AT ALL
6. BECOMING EASILY ANNOYED OR IRRITABLE: NOT AT ALL
3. WORRYING TOO MUCH ABOUT DIFFERENT THINGS: NOT AT ALL
2. NOT BEING ABLE TO STOP OR CONTROL WORRYING: NOT AT ALL
5. BEING SO RESTLESS THAT IT IS HARD TO SIT STILL: NOT AT ALL
1. FEELING NERVOUS, ANXIOUS, OR ON EDGE: NOT AT ALL

## 2018-12-20 ASSESSMENT — MIFFLIN-ST. JEOR: SCORE: 1157.2

## 2018-12-20 NOTE — PROGRESS NOTES

## 2018-12-21 ASSESSMENT — ANXIETY QUESTIONNAIRES: GAD7 TOTAL SCORE: 0

## 2019-01-15 ENCOUNTER — TRANSFERRED RECORDS (OUTPATIENT)
Dept: HEALTH INFORMATION MANAGEMENT | Facility: CLINIC | Age: 84
End: 2019-01-15

## 2019-03-18 DIAGNOSIS — M1A.00X0 IDIOPATHIC CHRONIC GOUT WITHOUT TOPHUS, UNSPECIFIED SITE: Primary | ICD-10-CM

## 2019-03-18 RX ORDER — INDOMETHACIN 25 MG/1
25 CAPSULE ORAL
Qty: 90 CAPSULE | Refills: 0 | Status: SHIPPED | OUTPATIENT
Start: 2019-03-18 | End: 2020-12-01

## 2019-03-18 NOTE — TELEPHONE ENCOUNTER
Pt called requesting medication for her gout PCP to advise.     Indomethacin 25 mg      Last Written Prescription Date:  6/30/17  Last Fill Quantity: 60,   # refills: 1  Last Office Visit: 12/20/18  Future Office visit:       Routing refill request to provider for review/approval because:  Drug not on the [facility], P or [facility] refill protocol or controlled substance

## 2019-06-12 ENCOUNTER — HOSPITAL ENCOUNTER (EMERGENCY)
Facility: HOSPITAL | Age: 84
Discharge: LEFT BEFORE DISCHARGE | End: 2019-06-12
Attending: EMERGENCY MEDICINE | Admitting: EMERGENCY MEDICINE
Payer: MEDICARE

## 2019-06-12 ENCOUNTER — TELEPHONE (OUTPATIENT)
Dept: FAMILY MEDICINE | Facility: OTHER | Age: 84
End: 2019-06-12

## 2019-06-12 VITALS
TEMPERATURE: 97.7 F | OXYGEN SATURATION: 100 % | DIASTOLIC BLOOD PRESSURE: 89 MMHG | SYSTOLIC BLOOD PRESSURE: 151 MMHG | RESPIRATION RATE: 18 BRPM

## 2019-06-12 DIAGNOSIS — S01.81XA LACERATION OF FOREHEAD, INITIAL ENCOUNTER: Primary | ICD-10-CM

## 2019-06-12 PROCEDURE — 99282 EMERGENCY DEPT VISIT SF MDM: CPT | Mod: Z6 | Performed by: EMERGENCY MEDICINE

## 2019-06-12 PROCEDURE — 99282 EMERGENCY DEPT VISIT SF MDM: CPT

## 2019-06-12 ASSESSMENT — ENCOUNTER SYMPTOMS
FEVER: 0
ABDOMINAL PAIN: 0
SHORTNESS OF BREATH: 0

## 2019-06-12 NOTE — ED NOTES
"Noted confusion in triage. \"I have memory problems.\" Awake and alert. GCS 15. [facility] [address] [facility] and was up walking by [place] this morning. \"I think I will [place] from now on.\"   "

## 2019-06-12 NOTE — ED PROVIDER NOTES
History     Chief Complaint   Patient presents with     Fall     pt was walking outside and hit her head on a box trailer that she thought she could walk underneath. denies LOC. denies neck pain. denies taking blood thinners. by stander brought pt to ED. laceration noted to left forehead. bleeding controlled.      HPI  Norma Banerjee is a 89 year old female who presented to the emergency department with a laceration on the forehead.  Patient was trying to dark and trailer and did not bend her low enough and hit the forehead against the edge of trailer.  Sustained a small laceration on the forehead just to the left of the midline.  Bleeding controlled by EMS prior to arrival at the ED.  No loss of consciousness, nausea, vomiting, chest pain or shortness of breath.  Denies any other injuries.    Allergies:  Allergies   Allergen Reactions     Alendronate Sodium      Hot tolerance  Fosamax       Nitrofurantoin      Chill  Fever  Chest pain  Macrobid       Nitrofurantoin Macrocrystal      Chills   Fever  Chest pain  Macrobid       Propoxyphene Napsylate      Darvocet-N 100       Sulfamethoxazole Rash     Bactrim DS     Trimethoprim Rash     Bactrim DS         Problem List:    Patient Active Problem List    Diagnosis Date Noted     Abnormal stress test 12/13/2017     Priority: Medium     Atypical chest pain 12/13/2017     Priority: Medium     ACP (advance care planning) 10/31/2016     Priority: Medium     Advance Care Planning 11/14/2016: Receipt of ACP document:  Received: POLST which was signed and dated by provider on 11/3/16.  Document previously scanned on 11/8/16.  Order reviewed and found to be valid.  Code Status reflects choices in most recent ACP document.  Confirmed/documented designated decision maker(s).  Added by Melba Luu  Advance Care Planning 10/31/2016: ACP Review of Chart / Resources Provided:  Reviewed chart for advance care plan.  Norma Banerjee has no plan or code status on file.  Discussed available resources and provided with information. Confirmed code status reflects current choices pending further ACP discussions.  Confirmed/documented legally designated decision makers.  Added by Mariana Richards               Incomplete tear of right rotator cuff 10/31/2016     Priority: Medium     Osteoarthritis 10/01/2012     Priority: Medium     Problem list name updated by automated process. Provider to review       Urinary incontinence 11/16/2006     Priority: Medium     Problem list name updated by automated process. Provider to review          Past Medical History:    Past Medical History:   Diagnosis Date     Carpal tunnel syndrome 02/14/2011     Dermoid cyst of ovary 03/05/2008     Gout 09/26/2011     Osteoarthritis 10/01/2012     Recurrent UTI 09/26/2011     Urinary incontinence 11/16/2006     Weakness of both legs 09/26/2011       Past Surgical History:    Past Surgical History:   Procedure Laterality Date     ARTHROSCOPY KNEE      Osteoarthritis, left     C THALLIUM STRESS TEST  2007    negative persantine thallium GXT, Chest pain     CATARACT IOL, RT/LT      bilateral cataract surgery     CHOLECYSTECTOMY      Cholecystitis     COLONOSCOPY  4/2008    free of disease     HC ECP WITH CATARACT SURGERY       JOINT REPLACEMENT      Osteoarthritis, bilateral     RELEASE CARPAL TUNNEL  2011    RT     SURGICAL PATHOLOGY EXAM      ovarian mass, laparotomy       Family History:    Family History   Problem Relation Age of Onset     Alzheimer Disease Mother 92        cause of death     Cancer Mother         uterine     C.A.D. Father      Cerebrovascular Disease Father 72        cva, cause of death     Diabetes Sister      Hypertension Sister      Hypertension Sister      Hypertension Sister        Social History:  Marital Status:  Single [1]  Social History     Tobacco Use     Smoking status: Former Smoker     Types: Cigarettes     Smokeless tobacco: Never Used     Tobacco comment: tried to quit yes,  passive exposure no   Substance Use Topics     Alcohol use: No     Drug use: No        Medications:      Acetaminophen (TYLENOL PO)   aspirin 81 MG tablet   indomethacin (INDOCIN) 25 MG capsule         Review of Systems   Constitutional: Negative for fever.   Respiratory: Negative for shortness of breath.    Cardiovascular: Negative for chest pain.   Gastrointestinal: Negative for abdominal pain.   All other systems reviewed and are negative.      Physical Exam   BP: 151/89  Heart Rate: 77  Temp: 97.7  F (36.5  C)  Resp: 18  SpO2: 100 %      Physical Exam   Constitutional: She is oriented to person, place, and time. She appears well-developed and well-nourished. No distress.   HENT:   Head: Normocephalic.       Eyes: Pupils are equal, round, and reactive to light. EOM are normal.   Cardiovascular: Normal rate, regular rhythm, normal heart sounds and intact distal pulses.   Pulmonary/Chest: Effort normal and breath sounds normal. No stridor. No respiratory distress.   Abdominal: Soft. Bowel sounds are normal. She exhibits no distension. There is no tenderness.   Musculoskeletal: She exhibits no edema or tenderness.   Neurological: She is alert and oriented to person, place, and time. No cranial nerve deficit.   Skin: Skin is warm. No rash noted. She is not diaphoretic.   Nursing note and vitals reviewed.      ED Course        Procedures      No results found for this or any previous visit (from the past 24 hour(s)).    Medications - No data to display    Assessments & Plan (with Medical Decision Making)   Laceration on the forehead after hitting it against the edge of a piece of metal on a trailer: Patient eloped from the emergency department before any testing caould be done or repair of the forehead laceration.  Law enforcement officers contacted by  to go and do a welfare check on the patient.    I have reviewed the nursing notes.    I have reviewed the findings, diagnosis, plan and need for follow  up with the patient.       Medication List      There are no discharge medications for this visit.         Final diagnoses:   Laceration of forehead, initial encounter       6/12/2019   [facility]     Jens Holguin MD  06/12/19 [id number]       Jens Holguin MD  06/12/19 [id number]

## 2019-06-12 NOTE — PROGRESS NOTES
Dwayne Turk, who is the chemical dependency counselor for [employer], advised that he was the bystander that found patient on the ground. He advised to this writer and to ROM John, that he found patient on the ground by the CT semi truck on the grounds and that it appeared she hit her head on the truck when she fell. He states she said she was trying to walk up on the curb around the truck so she wouldn't have to walk on the road and the uneven ground must have caused her to fall. Patient had stated to ROM Bonilla, that she fell by the fire lazo. Plan to meet with patient to discuss once imaging back to determine if any confusion d/t injury from the fall. Dr. Holguin agrees with this plan.    10:15 AM- was advised by Dr. Holguin that patient refused to wait for care and left. Updated ED Supervisor, Gregoria, to determine POC.    [place] [employer] was called for a welfare check and they notified us that patient was in her apartment and appeared to be ok but refused to come back to the ER because she doesn't want to wait here. Dr. Loree Foreman's nurse, Erendira advised Gregoria that she will reach out to patient to urge her to come to the ER.

## 2019-06-12 NOTE — TELEPHONE ENCOUNTER
ER Supervisor Gregoria came down to talk with me about this patient. She was brought into the ER for evaluation after a bystander witnessed her fall over by the MRI scanner in the parking lot. She did leave and didn't get a full evaluation. PD was called and a welfare check was done. I did try calling the patient and got no answer, I informed Gregoria and she will be calling her daughter to inform her of this as well as social workers.

## 2019-06-12 NOTE — ED NOTES
Attempted to contact patient.  Message left to return call.   Patient did not answer call.  There is significant concern for patient's well being due to the fact she lives alone and did hit her head today and did not stay for a full exam.   Given concerns reached out to patient's emergency contact daughter Lizz.   Generic message left with daughter to return call.

## 2019-06-12 NOTE — ED NOTES
Patient returned call.  This writer expressed concern as patient had fallen and struck head.   Patient notes she is fine and there is no need to worry.  Encouraged patient to come in with any symptoms of headache or not feeling herself and she verbalized understanding.  Also encouraged patient to contact daughter and let her know she fell and hit her head, patient stated she was waiting until daughter got off work.

## 2019-08-16 ENCOUNTER — TRANSFERRED RECORDS (OUTPATIENT)
Dept: HEALTH INFORMATION MANAGEMENT | Facility: CLINIC | Age: 84
End: 2019-08-16

## 2020-01-13 NOTE — PATIENT INSTRUCTIONS
Preventive Health Recommendations    See your health care provider every year to    Review health changes.     Discuss preventive care.      Review your medicines if your doctor has prescribed any.      You no longer need a yearly Pap test unless you've had an abnormal Pap test in the past 10 years. If you have vaginal symptoms, such as bleeding or discharge, be sure to talk with your provider about a Pap test.      Every 1 to 2 years, have a mammogram.  If you are over 69, talk with your health care provider about whether or not you want to continue having screening mammograms.      Every 10 years, have a colonoscopy. Or, have a yearly FIT test (stool test). These exams will check for colon cancer.       Have a cholesterol test every 5 years, or more often if your doctor advises it.       Have a diabetes test (fasting glucose) every three years. If you are at risk for diabetes, you should have this test more often.       At age 65, have a bone density scan (DEXA) to check for osteoporosis (brittle bone disease).    Shots:    Get a flu shot each year.    Get a tetanus shot every 10 years.    Talk to your doctor about your pneumonia vaccines. There are now two you should receive - Pneumovax (PPSV 23) and Prevnar (PCV 13).    Talk to your pharmacist about the shingles vaccine.    Talk to your doctor about the hepatitis B vaccine.    Nutrition:     Eat at least 5 servings of fruits and vegetables each day.      Eat whole-grain bread, whole-wheat pasta and brown rice instead of white grains and rice.      Get adequate about Calcium and Vitamin D.     Lifestyle    Exercise at least 150 minutes a week (30 minutes a day, 5 days a week). This will help you control your weight and prevent disease.      Limit alcohol to one drink per day.      No smoking.       Wear sunscreen to prevent skin cancer.       See your dentist twice a year for an exam and cleaning.      See your eye doctor every 1 to 2 years to screen for  conditions such as glaucoma, macular degeneration, cataracts, etc.    Personalized Prevention Plan  You are due for the preventive services outlined below.  Your care team is available to assist you in scheduling these services.  If you have already completed any of these items, please share that information with your care team to update in your medical record.    Health Maintenance Due   Topic Date Due     Zoster (Shingles) Vaccine (1 of 2) 01/01/1980     Pneumococcal Vaccine (2 of 2 - PCV13) 06/27/2007     Mammogram  08/16/2019     Flu Vaccine (1) 09/01/2019     FALL RISK ASSESSMENT  12/20/2019     Depression Assessment  12/20/2019     PHQ-2  01/01/2020     Annual Wellness Visit  12/20/2019

## 2020-01-13 NOTE — PROGRESS NOTES
"SUBJECTIVE:   Norma Banerjee is a 89 year old female who presents for Preventive Visit.      Are you in the first 12 months of your Medicare Part B coverage?  No    Physical Health:    In general, how would you rate your overall physical health? good    Outside of work, how many days during the week do you exercise? none    Outside of work, approximately how many minutes a day do you exercise?not applicable    If you drink alcohol do you typically have >3 drinks per day or >7 drinks per week? No    Do you usually eat at least 4 servings of fruit and vegetables a day, include whole grains & fiber and avoid regularly eating high fat or \"junk\" foods? Yes    Do you have any problems taking medications regularly?  No    Do you have any side effects from medications? none    Needs assistance for the following daily activities: no assistance needed    Which of the following safety concerns are present in your home?  none identified     Hearing impairment: No    In the past 6 months, have you been bothered by leaking of urine? no    Mental Health:    In general, how would you rate your overall mental or emotional health? good  PHQ-2 Score:      Do you feel safe in your environment? Yes    Have you ever done Advance Care Planning? (For example, a Health Directive, POLST, or a discussion with a medical provider or your loved ones about your wishes): No, advance care planning information given to patient to review.  Patient plans to discuss their wishes with loved ones or provider.      Additional concerns to address?  No      Paresthesias of hands and feet while walking  This is a numbness of her hands and feet while walking. It will occur of her hands at times with doing hand work. Her legs will sometimes ache with walking as well, and improve with rest.     Syncope  She walks 10,000 steps daily. She was walking and got back to her apartment, had the paresthesias, and was getting her keys out to open her door when she " passes out. She feels this was for a couple of seconds but she fell to the floor. No chest pain or shortness of breath with activity    Vision change  She notes zigzag vision change in the left eye with return of vision, which is recurent, lasting about 5 minutes. She has had headaches in the past, but none with this. Her eye provider has retired and was in [place].     Legs aching with walking      Fall risk:       Do you have sleep apnea, excessive snoring or daytime drowsiness?: no            Reviewed and updated as needed this visit by clinical staff         Reviewed and updated as needed this visit by Provider        Social History     Tobacco Use     Smoking status: Former Smoker     Types: Cigarettes     Smokeless tobacco: Never Used     Tobacco comment: tried to quit yes, passive exposure no   Substance Use Topics     Alcohol use: No                           Current providers sharing in care for this patient include:   Patient Care Team:  Carol Foreman MD as PCP - General (Family Practice)  Carol Foreman MD as Assigned PCP  Ryne Rodriguez DO as MD (Cardiology)    The following health maintenance items are reviewed in Epic and correct as of today:  Health Maintenance   Topic Date Due     ZOSTER IMMUNIZATION (1 of 2) 01/01/1980     PNEUMOCOCCAL IMMUNIZATION 65+ LOW/MEDIUM RISK (2 of 2 - PCV13) 06/27/2007     MAMMO SCREENING  08/16/2019     INFLUENZA VACCINE (1) 09/01/2019     FALL RISK ASSESSMENT  12/20/2019     PHQ-9  12/20/2019     PHQ-2  01/01/2020     MEDICARE ANNUAL WELLNESS VISIT  12/20/2019     ADVANCE CARE PLANNING  10/31/2021     DTAP/TDAP/TD IMMUNIZATION (3 - Td) 11/11/2023     IPV IMMUNIZATION  Aged Out     MENINGITIS IMMUNIZATION  Aged Out     BP Readings from Last 3 Encounters:   01/20/20 122/74   06/12/19 151/89   12/20/18 140/80    Wt Readings from Last 3 Encounters:   01/20/20 80.7 kg (178 lb)   12/20/18 79.4 kg (175 lb)   04/18/18 83.9 kg (185 lb)                  Patient Active  Problem List   Diagnosis     Urinary incontinence     Osteoarthritis     ACP (advance care planning)     Incomplete tear of right rotator cuff     Abnormal stress test     Atypical chest pain     Past Surgical History:   Procedure Laterality Date     ARTHROSCOPY KNEE      Osteoarthritis, left     C THALLIUM STRESS TEST  2007    negative persantine thallium GXT, Chest pain     CATARACT IOL, RT/LT      bilateral cataract surgery     CHOLECYSTECTOMY      Cholecystitis     COLONOSCOPY  4/2008    free of disease     HC ECP WITH CATARACT SURGERY       JOINT REPLACEMENT      Osteoarthritis, bilateral     RELEASE CARPAL TUNNEL  2011    RT     SURGICAL PATHOLOGY EXAM      ovarian mass, laparotomy       Social History     Tobacco Use     Smoking status: Former Smoker     Types: Cigarettes     Smokeless tobacco: Never Used     Tobacco comment: tried to quit yes, passive exposure no   Substance Use Topics     Alcohol use: No     Family History   Problem Relation Age of Onset     Alzheimer Disease Mother 92        cause of death     Cancer Mother         uterine     C.A.D. Father      Cerebrovascular Disease Father 72        cva, cause of death     Diabetes Sister      Hypertension Sister      Hypertension Sister      Hypertension Sister          Current Outpatient Medications   Medication Sig Dispense Refill     Acetaminophen (TYLENOL PO) Take by mouth At Bedtime       aspirin 81 MG tablet Take 1 tablet (81 mg) by mouth daily  OTC     indomethacin (INDOCIN) 25 MG capsule Take 1 capsule (25 mg) by mouth 3 times daily (with meals) 90 capsule 0     Allergies   Allergen Reactions     Alendronate Sodium      Hot tolerance  Fosamax       Nitrofurantoin      Chill  Fever  Chest pain  Macrobid       Nitrofurantoin Macrocrystal      Chills   Fever  Chest pain  Macrobid       Propoxyphene Napsylate      Darvocet-N 100       Sulfamethoxazole Rash     Bactrim DS     Trimethoprim Rash     Bactrim DS       Mammogram Screening: Mammo  "discussed, not appropriate for or declined by this patient. No further mammograms     ROS:  CONSTITUTIONAL: NEGATIVE for fever, chills, change in weight  INTEGUMENTARY/SKIN: NEGATIVE for worrisome rashes, moles or lesions  EYES: NEGATIVE for vision changes or irritation  ENT/MOUTH: NEGATIVE for ear, mouth and throat problems  RESP: NEGATIVE for significant cough or SOB  BREAST: NEGATIVE for masses, tenderness or discharge  CV: NEGATIVE for chest pain, palpitations or peripheral edema  GI: NEGATIVE for nausea, abdominal pain, heartburn, or change in bowel habits  : NEGATIVE for frequency, dysuria, or hematuria  MUSCULOSKELETAL: NEGATIVE for significant arthralgias or myalgia  NEURO: NEGATIVE for weakness, dizziness or paresthesias  ENDOCRINE: NEGATIVE for temperature intolerance, skin/hair changes  HEME: NEGATIVE for bleeding problems  PSYCHIATRIC: NEGATIVE for changes in mood or affect    OBJECTIVE:   There were no vitals taken for this visit. Estimated body mass index is 33.34 kg/m  as calculated from the following:    Height as of 12/20/18: 1.543 m (5' 0.75\").    Weight as of 12/20/18: 79.4 kg (175 lb).  EXAM:   GENERAL APPEARANCE: healthy, alert and no distress  EYES: Eyes grossly normal to inspection, PERRL and conjunctivae and sclerae normal  HENT: ear canals and TM's normal, nose and mouth without ulcers or lesions, oropharynx clear and oral mucous membranes moist  NECK: no adenopathy, no asymmetry, masses, or scars and thyroid normal to palpation  RESP: lungs clear to auscultation - no rales, rhonchi or wheezes  BREAST: normal without masses, tenderness or nipple discharge and no palpable axillary masses or adenopathy  CV: regular rate and rhythm, normal S1 S2, no S3 or S4, no murmur, click or rub, no peripheral edema and peripheral pulses strong  ABDOMEN: soft, nontender, no hepatosplenomegaly, no masses and bowel sounds normal  MS:  musculoskeletal changes of OA noted of the hands and wrists are noted and " "gait is age appropriate without ataxia. Tinel's sign is negative bilaterally.   SKIN: no suspicious lesions or rashes  NEURO: Normal strength and tone, sensory exam grossly normal, mentation intact and speech normal  PSYCH: mentation appears normal and affect normal/bright        ASSESSMENT / PLAN:   1. Routine general medical examination at a health care facility  Further mammograms declined    2. Primary osteoarthritis involving multiple joints  Multiple joints    3. Syncope, unspecified syncope type  Will obtain Zio patch to evaluate for arrhythmias. Last lexiscan stress test was in 2017   - Zio Patch (RANGE) Adult Pediatric > 48 hours; Future  - CBC with platelets  - Comprehensive metabolic panel    4. Paresthesia  Hands and ankles with walking. Check SURJIT  - ANKLE-ARM INDEX 1-2 LEVEL BILATERAL; Future    5. Other specified symptoms and signs involving the circulatory and respiratory systems     - ANKLE-ARM INDEX 1-2 LEVEL BILATERAL; Future    6. Immunization due  Tdap and flu shot are given today    7. Need for prophylactic vaccination and inoculation against influenza    - INFLUENZA (HIGH DOSE) 3 VALENT VACCINE [80800]  - Pneumococcal vaccine 13 valent PCV13 IM (Prevnar) [29214]  - Vaccine Administration, Each Additional [30373]    COUNSELING:  Reviewed preventive health counseling, as reflected in patient instructions       Regular exercise       Healthy diet/nutrition       Immunizations    Vaccinated for: Influenza and TDAP          Estimated body mass index is 33.34 kg/m  as calculated from the following:    Height as of 12/20/18: 1.543 m (5' 0.75\").    Weight as of 12/20/18: 79.4 kg (175 lb).    Weight management plan: Discussed healthy diet and exercise guidelines     reports that she has quit smoking. Her smoking use included cigarettes. She has never used smokeless tobacco.      Appropriate preventive services were discussed with this patient, including applicable screening as appropriate for " cardiovascular disease, diabetes, osteopenia/osteoporosis, and glaucoma.  As appropriate for age/gender, discussed screening for colorectal cancer, prostate cancer, breast cancer, and cervical cancer. Checklist reviewing preventive services available has been given to the patient.    Reviewed patients plan of care and provided an AVS. The Basic Care Plan (routine screening as documented in Health Maintenance) for Norma meets the Care Plan requirement. This Care Plan has been established and reviewed with the Patient.    Counseling Resources:  ATP IV Guidelines  Pooled Cohorts Equation Calculator  Breast Cancer Risk Calculator  FRAX Risk Assessment  ICSI Preventive Guidelines  Dietary Guidelines for Americans, 2010  USDA's MyPlate  ASA Prophylaxis  Lung CA Screening    Carol Foreman MD  [facility]

## 2020-01-20 ENCOUNTER — OFFICE VISIT (OUTPATIENT)
Dept: FAMILY MEDICINE | Facility: OTHER | Age: 85
End: 2020-01-20
Attending: FAMILY MEDICINE
Payer: COMMERCIAL

## 2020-01-20 VITALS
SYSTOLIC BLOOD PRESSURE: 122 MMHG | RESPIRATION RATE: 19 BRPM | BODY MASS INDEX: 33.61 KG/M2 | WEIGHT: 178 LBS | HEART RATE: 78 BPM | HEIGHT: 61 IN | DIASTOLIC BLOOD PRESSURE: 74 MMHG | OXYGEN SATURATION: 98 % | TEMPERATURE: 98 F

## 2020-01-20 DIAGNOSIS — R09.89 OTHER SPECIFIED SYMPTOMS AND SIGNS INVOLVING THE CIRCULATORY AND RESPIRATORY SYSTEMS: ICD-10-CM

## 2020-01-20 DIAGNOSIS — Z23 NEED FOR PROPHYLACTIC VACCINATION AND INOCULATION AGAINST INFLUENZA: ICD-10-CM

## 2020-01-20 DIAGNOSIS — M15.0 PRIMARY OSTEOARTHRITIS INVOLVING MULTIPLE JOINTS: ICD-10-CM

## 2020-01-20 DIAGNOSIS — Z00.00 ROUTINE GENERAL MEDICAL EXAMINATION AT A HEALTH CARE FACILITY: Primary | ICD-10-CM

## 2020-01-20 DIAGNOSIS — R20.2 PARESTHESIA: ICD-10-CM

## 2020-01-20 DIAGNOSIS — R55 SYNCOPE, UNSPECIFIED SYNCOPE TYPE: ICD-10-CM

## 2020-01-20 DIAGNOSIS — Z23 IMMUNIZATION DUE: ICD-10-CM

## 2020-01-20 LAB
ALBUMIN SERPL-MCNC: 3.8 G/DL (ref 3.4–5)
ALP SERPL-CCNC: 83 U/L (ref 40–150)
ALT SERPL W P-5'-P-CCNC: 21 U/L (ref 0–50)
ANION GAP SERPL CALCULATED.3IONS-SCNC: 5 MMOL/L (ref 3–14)
AST SERPL W P-5'-P-CCNC: 14 U/L (ref 0–45)
BILIRUB SERPL-MCNC: 0.3 MG/DL (ref 0.2–1.3)
BUN SERPL-MCNC: 16 MG/DL (ref 7–30)
CALCIUM SERPL-MCNC: 9.3 MG/DL (ref 8.5–10.1)
CHLORIDE SERPL-SCNC: 103 MMOL/L (ref 94–109)
CO2 SERPL-SCNC: 28 MMOL/L (ref 20–32)
CREAT SERPL-MCNC: 0.8 MG/DL (ref 0.52–1.04)
ERYTHROCYTE [DISTWIDTH] IN BLOOD BY AUTOMATED COUNT: 12.6 % (ref 10–15)
GFR SERPL CREATININE-BSD FRML MDRD: 65 ML/MIN/{1.73_M2}
GLUCOSE SERPL-MCNC: 91 MG/DL (ref 70–99)
HCT VFR BLD AUTO: 40.7 % (ref 35–47)
HGB BLD-MCNC: 13.4 G/DL (ref 11.7–15.7)
MCH RBC QN AUTO: 30.6 PG (ref 26.5–33)
MCHC RBC AUTO-ENTMCNC: 32.9 G/DL (ref 31.5–36.5)
MCV RBC AUTO: 93 FL (ref 78–100)
PLATELET # BLD AUTO: 197 10E9/L (ref 150–450)
POTASSIUM SERPL-SCNC: 4 MMOL/L (ref 3.4–5.3)
PROT SERPL-MCNC: 7.8 G/DL (ref 6.8–8.8)
RBC # BLD AUTO: 4.38 10E12/L (ref 3.8–5.2)
SODIUM SERPL-SCNC: 136 MMOL/L (ref 133–144)
WBC # BLD AUTO: 6.9 10E9/L (ref 4–11)

## 2020-01-20 PROCEDURE — G0009 ADMIN PNEUMOCOCCAL VACCINE: HCPCS | Performed by: FAMILY MEDICINE

## 2020-01-20 PROCEDURE — 36415 COLL VENOUS BLD VENIPUNCTURE: CPT | Mod: ZL | Performed by: FAMILY MEDICINE

## 2020-01-20 PROCEDURE — G0008 ADMIN INFLUENZA VIRUS VAC: HCPCS

## 2020-01-20 PROCEDURE — 85027 COMPLETE CBC AUTOMATED: CPT | Mod: ZL | Performed by: FAMILY MEDICINE

## 2020-01-20 PROCEDURE — 90662 IIV NO PRSV INCREASED AG IM: CPT

## 2020-01-20 PROCEDURE — 80053 COMPREHEN METABOLIC PANEL: CPT | Mod: ZL | Performed by: FAMILY MEDICINE

## 2020-01-20 PROCEDURE — 99397 PER PM REEVAL EST PAT 65+ YR: CPT | Performed by: FAMILY MEDICINE

## 2020-01-20 PROCEDURE — 90670 PCV13 VACCINE IM: CPT

## 2020-01-20 ASSESSMENT — ANXIETY QUESTIONNAIRES
6. BECOMING EASILY ANNOYED OR IRRITABLE: NOT AT ALL
3. WORRYING TOO MUCH ABOUT DIFFERENT THINGS: NOT AT ALL
7. FEELING AFRAID AS IF SOMETHING AWFUL MIGHT HAPPEN: NOT AT ALL
4. TROUBLE RELAXING: NOT AT ALL
GAD7 TOTAL SCORE: 0
1. FEELING NERVOUS, ANXIOUS, OR ON EDGE: NOT AT ALL
5. BEING SO RESTLESS THAT IT IS HARD TO SIT STILL: NOT AT ALL
2. NOT BEING ABLE TO STOP OR CONTROL WORRYING: NOT AT ALL
IF YOU CHECKED OFF ANY PROBLEMS ON THIS QUESTIONNAIRE, HOW DIFFICULT HAVE THESE PROBLEMS MADE IT FOR YOU TO DO YOUR WORK, TAKE CARE OF THINGS AT HOME, OR GET ALONG WITH OTHER PEOPLE: NOT DIFFICULT AT ALL

## 2020-01-20 ASSESSMENT — PATIENT HEALTH QUESTIONNAIRE - PHQ9: SUM OF ALL RESPONSES TO PHQ QUESTIONS 1-9: 0

## 2020-01-20 ASSESSMENT — PAIN SCALES - GENERAL: PAINLEVEL: NO PAIN (0)

## 2020-01-20 ASSESSMENT — MIFFLIN-ST. JEOR: SCORE: 1164.78

## 2020-01-20 NOTE — LETTER
January 20, 2020      Norma Banerjee  [address]  [place] [address]        Dear ,    We are writing to inform you of your test results.    Your test results fall within the expected range(s) or remain unchanged from previous results.  Please continue with current treatment plan.    Resulted Orders   CBC with platelets   Result Value Ref Range    WBC 6.9 4.0 - 11.0 10e9/L    RBC Count 4.38 3.8 - 5.2 10e12/L    Hemoglobin 13.4 11.7 - 15.7 g/dL    Hematocrit 40.7 35.0 - 47.0 %    MCV 93 78 - 100 fl    MCH 30.6 26.5 - 33.0 pg    MCHC 32.9 31.5 - 36.5 g/dL    RDW 12.6 10.0 - 15.0 %    Platelet Count 197 150 - 450 10e9/L   Comprehensive metabolic panel   Result Value Ref Range    Sodium 136 133 - 144 mmol/L    Potassium 4.0 3.4 - 5.3 mmol/L    Chloride 103 94 - 109 mmol/L    Carbon Dioxide 28 20 - 32 mmol/L    Anion Gap 5 3 - 14 mmol/L    Glucose 91 70 - 99 mg/dL    Urea Nitrogen 16 7 - 30 mg/dL    Creatinine 0.80 0.52 - 1.04 mg/dL    GFR Estimate 65 >60 mL/min/[1.73_m2]      Comment:      Non  GFR Calc  Starting 12/18/2018, serum creatinine based estimated GFR (eGFR) will be   calculated using the Chronic Kidney Disease Epidemiology Collaboration   (CKD-EPI) equation.      GFR Estimate If Black 75 >60 mL/min/[1.73_m2]      Comment:       GFR Calc  Starting 12/18/2018, serum creatinine based estimated GFR (eGFR) will be   calculated using the Chronic Kidney Disease Epidemiology Collaboration   (CKD-EPI) equation.      Calcium 9.3 8.5 - 10.1 mg/dL    Bilirubin Total 0.3 0.2 - 1.3 mg/dL    Albumin 3.8 3.4 - 5.0 g/dL    Protein Total 7.8 6.8 - 8.8 g/dL    Alkaline Phosphatase 83 40 - 150 U/L    ALT 21 0 - 50 U/L    AST 14 0 - 45 U/L       If you have any questions or concerns, please call the clinic at the number listed above.       Sincerely,        Carol Foreman MD

## 2020-01-20 NOTE — NURSING NOTE
"Chief Complaint   Patient presents with     Physical       Initial /74   Pulse 78   Temp 98  F (36.7  C) (Tympanic)   Resp 19   Ht 1.549 m (5' 1\")   Wt 80.7 kg (178 lb)   SpO2 98%   BMI 33.63 kg/m   Estimated body mass index is 33.63 kg/m  as calculated from the following:    Height as of this encounter: 1.549 m (5' 1\").    Weight as of this encounter: 80.7 kg (178 lb).  Medication Reconciliation: complete  Erendira Sol, LINDA    "

## 2020-01-21 ENCOUNTER — HOSPITAL ENCOUNTER (OUTPATIENT)
Dept: CARDIOLOGY | Facility: HOSPITAL | Age: 85
Discharge: HOME | End: 2020-01-21
Attending: FAMILY MEDICINE | Admitting: FAMILY MEDICINE
Payer: MEDICARE

## 2020-01-21 DIAGNOSIS — R55 SYNCOPE, UNSPECIFIED SYNCOPE TYPE: ICD-10-CM

## 2020-01-21 PROCEDURE — 0296T ZIO PATCH HOLTER ADULT PEDIATRIC GREATER THAN 48 HRS: CPT | Mod: TC

## 2020-01-21 PROCEDURE — 0298T ZIO PATCH HOLTER ADULT PEDIATRIC GREATER THAN 48 HRS: CPT | Performed by: INTERNAL MEDICINE

## 2020-01-21 ASSESSMENT — ANXIETY QUESTIONNAIRES: GAD7 TOTAL SCORE: 0

## 2020-02-13 DIAGNOSIS — I47.10 PAROXYSMAL SUPRAVENTRICULAR TACHYCARDIA (H): ICD-10-CM

## 2020-02-13 DIAGNOSIS — R55 SYNCOPE, UNSPECIFIED SYNCOPE TYPE: Primary | ICD-10-CM

## 2020-05-26 ENCOUNTER — TELEPHONE (OUTPATIENT)
Dept: CARDIOLOGY | Facility: OTHER | Age: 85
End: 2020-05-26

## 2020-05-26 ENCOUNTER — OFFICE VISIT (OUTPATIENT)
Dept: CARDIOLOGY | Facility: OTHER | Age: 85
End: 2020-05-26
Attending: FAMILY MEDICINE
Payer: COMMERCIAL

## 2020-05-26 VITALS
BODY MASS INDEX: 33.42 KG/M2 | HEIGHT: 61 IN | HEART RATE: 56 BPM | DIASTOLIC BLOOD PRESSURE: 85 MMHG | RESPIRATION RATE: 18 BRPM | WEIGHT: 177 LBS | TEMPERATURE: 98.8 F | OXYGEN SATURATION: 97 % | SYSTOLIC BLOOD PRESSURE: 160 MMHG

## 2020-05-26 DIAGNOSIS — R55 SYNCOPE, UNSPECIFIED SYNCOPE TYPE: Primary | ICD-10-CM

## 2020-05-26 DIAGNOSIS — R55 SYNCOPE, UNSPECIFIED SYNCOPE TYPE: ICD-10-CM

## 2020-05-26 DIAGNOSIS — I47.10 PAROXYSMAL SUPRAVENTRICULAR TACHYCARDIA (H): ICD-10-CM

## 2020-05-26 DIAGNOSIS — R94.39 ABNORMAL STRESS TEST: ICD-10-CM

## 2020-05-26 DIAGNOSIS — R07.89 ATYPICAL CHEST PAIN: ICD-10-CM

## 2020-05-26 DIAGNOSIS — I47.29 NSVT (NONSUSTAINED VENTRICULAR TACHYCARDIA) (H): ICD-10-CM

## 2020-05-26 DIAGNOSIS — I10 ESSENTIAL HYPERTENSION: ICD-10-CM

## 2020-05-26 PROCEDURE — G0463 HOSPITAL OUTPT CLINIC VISIT: HCPCS | Mod: 25

## 2020-05-26 PROCEDURE — 93005 ELECTROCARDIOGRAM TRACING: CPT

## 2020-05-26 PROCEDURE — G0463 HOSPITAL OUTPT CLINIC VISIT: HCPCS

## 2020-05-26 PROCEDURE — 93010 ELECTROCARDIOGRAM REPORT: CPT | Performed by: INTERNAL MEDICINE

## 2020-05-26 PROCEDURE — 99215 OFFICE O/P EST HI 40 MIN: CPT | Performed by: NURSE PRACTITIONER

## 2020-05-26 ASSESSMENT — PAIN SCALES - GENERAL: PAINLEVEL: MODERATE PAIN (5)

## 2020-05-26 ASSESSMENT — MIFFLIN-ST. JEOR: SCORE: 1160.25

## 2020-05-26 NOTE — PATIENT INSTRUCTIONS
You will have an echocardiogram performed.  This is an ultrasound of the heart, that evaluates heart function.  The hospital scheduling department will call to schedule you for this test.           Thank you for allowing Samara Simmons and our  team to participate in your care. Please call our office at [phone] with scheduling questions or with any other questions or concerns.       If you experience chest pain, chest pressure, chest tightness, shortness of breath, fainting, lightheadedness, nausea, vomiting, or other concerning symptoms, please report to the Emergency Department or call 911. These symptoms may be emergent, and best treated in the Emergency Department.    Follow up in 3-6 months

## 2020-05-26 NOTE — NURSING NOTE
"Chief Complaint   Patient presents with     Cardiology Problem     Paroxysmal supraventricular tachycardia, Syncope; Referred by Dr. Carol Foreman       Initial BP (!) 170/88 (BP Location: Right arm, Patient Position: Chair, Cuff Size: Adult Large)   Pulse 55   Resp 18   Ht 1.549 m (5' 1\")   Wt 80.3 kg (177 lb)   SpO2 98%   BMI 33.44 kg/m   Estimated body mass index is 33.44 kg/m  as calculated from the following:    Height as of this encounter: 1.549 m (5' 1\").    Weight as of this encounter: 80.3 kg (177 lb).  Medication Reconciliation: complete  Lela Cortés LPN    "

## 2020-05-26 NOTE — PROGRESS NOTES
[facility] HEART CARE   CARDIOLOGY CONSULT     Norma Banerjee   [address]  [place] [address]      Carol Foreman     Chief Complaint   Patient presents with     Cardiology Problem     Paroxysmal supraventricular tachycardia, Syncope; Referred by Dr. Carol Foreman        HPI:   Ms. Banerjee is a 89 year old female who presents for cardiology evaluation with recent syncope and recent ZIO cardiac monitor with presence of PSVT. Patient has a history of OA, incontinence and history of rotator cuff tear. She has a remote history of tobacco use, quit smoking 61 years ago.     Patiet underwent CT angio of the chest in 2017 without evidence of dissection. There was a small volume of calcified and noncalcified plaque seen throughout the thoracic aorta. She underwent NM Lexiscan perfusion scan in 2017 with small infarct involving kee-infarct ischemia of the anteroseptal wall close to the cardiac apex. She was seen by Dr. Rodriguez at this time with recommendation for coronary CTA versus cath to fully identify any possible flow limiting diease. She was not interested in any further testing at that time. It was also discussed medical management. If symptoms where to occur, consider starting BB, SL nitroglycerin and possibly Imdur.     Patient with recent syncope episode. She reports walking 10,000 steps daily. She was recently out walking and when she got back to her apartment described paraesthesias, she was getting out her keys to open the door when she passed out. She did not report any chest pain or dyspnea with exertion prior. She has experienced paraesthesias in her hands and feet while walking, describes as a numbness. She has also described symptoms of intermittent claudication. Reported aching in legs while walking that improved with rest.     With recent episode of syncope, she was referred for ZIO cardiac monitor. Results have been reviewed today. Underlying rhythm was sinus with average HR 64 bpm and  "minimum HR 41 bpm nocturnal and maximum  bpm which corresponded with episode of SVT. There was no significant symptomatic bradycardia, pauses, AF, second degree mobitz II or third degree AV block. She had one single run on nonsustained VT lasting 5 beats with max rate of 167 bpm. She had 62 runs of PSVT lasting up to 11.0 sec with max HR of 203 bpm. Rare, <1% VE's and occasional SVE, 2.3%. There where episodes of ventricular bigeminy lasting up to 3.9 sec and ventricular trigeminy lasting up to 19.3 sec.    Today patient reports she has been feeling good, no recurrence of syncope. Admits that just prior to syncope occurring she got worked up as she could not get her key in the door, she does not remember falling. Her neighbor heard her fall and found her on the ground alert. She denies feeling lightheaded. No palpitations. No chest pain or pressure. No changes in breathing or increased dyspnea. She does report chronic lower extremity edema which is unchanged. She has been struggling with pain down her right leg. Reports pain starting at her \"tailbone down to my lower leg on left\". Pain continues to get worse with walking until she rests and experiences weakness in leg with pain.      PAST MEDICAL HISTORY:   Past Medical History:   Diagnosis Date     Carpal tunnel syndrome 02/14/2011     Dermoid cyst of ovary 03/05/2008    removed     Gout 09/26/2011     Osteoarthritis 10/01/2012     Recurrent UTI 09/26/2011     Urinary incontinence 11/16/2006     Weakness of both legs 09/26/2011          FAMILY HISTORY:   Family History   Problem Relation Age of Onset     Alzheimer Disease Mother 92        cause of death     Cancer Mother         uterine     C.A.D. Father      Cerebrovascular Disease Father 72        cva, cause of death     Diabetes Sister      Hypertension Sister      Hypertension Sister      Hypertension Sister           PAST SURGICAL HISTORY:   Past Surgical History:   Procedure Laterality Date     " ARTHROSCOPY KNEE      Osteoarthritis, left     C THALLIUM STRESS TEST  2007    negative persantine thallium GXT, Chest pain     CATARACT IOL, RT/LT      bilateral cataract surgery     CHOLECYSTECTOMY      Cholecystitis     COLONOSCOPY  4/2008    free of disease     HC ECP WITH CATARACT SURGERY       JOINT REPLACEMENT      Osteoarthritis, bilateral     RELEASE CARPAL TUNNEL  2011    RT     SURGICAL PATHOLOGY EXAM      ovarian mass, laparotomy          SOCIAL HISTORY:   Social History     Socioeconomic History     Marital status: Single     Spouse name: Not on file     Number of children: Not on file     Years of education: Not on file     Highest education level: Not on file   Occupational History     Not on file   Social Needs     Financial resource strain: Not on file     Food insecurity     Worry: Not on file     Inability: Not on file     Transportation needs     Medical: Not on file     Non-medical: Not on file   Tobacco Use     Smoking status: Former Smoker     Types: Cigarettes     Smokeless tobacco: Never Used     Tobacco comment: tried to quit yes, passive exposure no   Substance and Sexual Activity     Alcohol use: No     Drug use: No     Sexual activity: Not on file   Lifestyle     Physical activity     Days per week: Not on file     Minutes per session: Not on file     Stress: Not on file   Relationships     Social connections     Talks on phone: Not on file     Gets together: Not on file     Attends Latter day service: Not on file     Active member of club or organization: Not on file     Attends meetings of clubs or organizations: Not on file     Relationship status: Not on file     Intimate partner violence     Fear of current or ex partner: Not on file     Emotionally abused: Not on file     Physically abused: Not on file     Forced sexual activity: Not on file   Other Topics Concern      Service Not Asked     Blood Transfusions Not Asked     Caffeine Concern Yes     Comment: coffee 2 cups       Occupational Exposure Not Asked     Hobby Hazards Not Asked     Sleep Concern Not Asked     Stress Concern Not Asked     Weight Concern Not Asked     Special Diet Not Asked     Back Care Not Asked     Exercise Not Asked     Bike Helmet Not Asked     Seat Belt Not Asked     Self-Exams Not Asked     Parent/sibling w/ CABG, MI or angioplasty before 65F 55M? No   Social History Narrative     Not on file          CURRENT MEDICATIONS:   Prior to Admission medications    Medication Sig Start Date End Date Taking? Authorizing Provider   Acetaminophen (TYLENOL PO) Take by mouth At Bedtime    Reported, Patient   aspirin 81 MG tablet Take 1 tablet (81 mg) by mouth daily 11/15/17   Darvin Cortez PA-C   indomethacin (INDOCIN) 25 MG capsule Take 1 capsule (25 mg) by mouth 3 times daily (with meals) 3/18/19   Carol Foreman MD          ALLERGIES:   Allergies   Allergen Reactions     Alendronate Sodium      Hot tolerance  Fosamax       Nitrofurantoin      Chill  Fever  Chest pain  Macrobid       Nitrofurantoin Macrocrystal      Chills   Fever  Chest pain  Macrobid       Propoxyphene Napsylate      Darvocet-N 100       Sulfamethoxazole Rash     Bactrim DS     Trimethoprim Rash     Bactrim DS            ROS:   CONSTITUTIONAL: No reported fever or chills. No changes in weight.  ENT: No visual disturbance, ear ache, epistaxis or sore throat.   CARDIOVASCULAR: No chest pain, chest pressure or chest discomfort. No palpitations. Positive for chornic lower extremity edema.   RESPIRATORY: No increased dyspnea, cough, wheezing or hemoptysis.   GI: No reported abdominal pain.   : No reported hematuria or dysuria.   NEUROLOGICAL: No lightheadedness or dizziness. Recent episode of syncope without recurrence. Positive for paresthesias and lower extremity weakness.   HEMATOLOGIC: No history of anemia. No bleeding or excessive bruising. No history of blood clots.   ENDOCRINOLOGIC: No temperature intolerance. No hair or skin changes.  SKIN: No  "abnormal rashes or sores, no unusual itching.  PSYCHIATRIC: No history of depression or anxiety. No changes in mood, feeling down or anxious. No changes in sleep.      PHYSICAL EXAM:   BP (!) 160/85   Pulse 56   Temp 98.8  F (37.1  C) (Tympanic)   Resp 18   Ht 1.549 m (5' 1\")   Wt 80.3 kg (177 lb)   SpO2 97%   BMI 33.44 kg/m    GENERAL: The patient is a well-developed, well-nourished, in no apparent distress.  HEENT: Head is normocephalic and atraumatic. Eyes are symmetrical with normal visual tracking. No icterus, no xanthelasmas. Nares appeared normal without nasal drainage. Mucous membranes are moist, no cyanosis.  NECK: Supple. Carotid upstroke are normal bilaterally, no cervical bruits, JVP not visible.   CHEST/ LUNGS: Lungs clear to auscultation, no rales, rhonchi or wheezes, no use of accessory muscles, no retractions, respirations unlabored and normal respiratory rate.   CARDIO: Regular rate and rhythm normal with S1 and S2, no S3 or S4 and no murmur, click or rub. Precordium quiet with normal PMI.    ABD: Abdomen is soft and nondistended.  EXTREMITIES:  Trace LE edema present.   MUSCULOSKELETAL: No visible joint swelling.   NEUROLOGIC: Alert and oriented X3. Normal speech, gait and affect. No focal neurologic deficits.   SKIN: No jaundice. No rashes or visible skin lesions present. No ecchymosis.       EKG:    Sinus bradycardia, rate 57 bpm.    LAB RESULTS:   No visits with results within 3 Month(s) from this visit.   Latest known visit with results is:   Office Visit on 01/20/2020   Component Date Value Ref Range Status     WBC 01/20/2020 6.9  4.0 - 11.0 10e9/L Final     RBC Count 01/20/2020 4.38  3.8 - 5.2 10e12/L Final     Hemoglobin 01/20/2020 13.4  11.7 - 15.7 g/dL Final     Hematocrit 01/20/2020 40.7  35.0 - 47.0 % Final     MCV 01/20/2020 93  78 - 100 fl Final     MCH 01/20/2020 30.6  26.5 - 33.0 pg Final     MCHC 01/20/2020 32.9  31.5 - 36.5 g/dL Final     RDW 01/20/2020 12.6  10.0 - 15.0 % " Final     Platelet Count 01/20/2020 197  150 - 450 10e9/L Final     Sodium 01/20/2020 136  133 - 144 mmol/L Final     Potassium 01/20/2020 4.0  3.4 - 5.3 mmol/L Final     Chloride 01/20/2020 103  94 - 109 mmol/L Final     Carbon Dioxide 01/20/2020 28  20 - 32 mmol/L Final     Anion Gap 01/20/2020 5  3 - 14 mmol/L Final     Glucose 01/20/2020 91  70 - 99 mg/dL Final     Urea Nitrogen 01/20/2020 16  7 - 30 mg/dL Final     Creatinine 01/20/2020 0.80  0.52 - 1.04 mg/dL Final     GFR Estimate 01/20/2020 65  >60 mL/min/[1.73_m2] Final     GFR Estimate If Black 01/20/2020 75  >60 mL/min/[1.73_m2] Final     Calcium 01/20/2020 9.3  8.5 - 10.1 mg/dL Final     Bilirubin Total 01/20/2020 0.3  0.2 - 1.3 mg/dL Final     Albumin 01/20/2020 3.8  3.4 - 5.0 g/dL Final     Protein Total 01/20/2020 7.8  6.8 - 8.8 g/dL Final     Alkaline Phosphatase 01/20/2020 83  40 - 150 U/L Final     ALT 01/20/2020 21  0 - 50 U/L Final     AST 01/20/2020 14  0 - 45 U/L Final          ASSESSMENT:   Norma Banerjee presents for cardiology evaluation with recent syncope and recent ZIO cardiac monitor with presence of PSVT. Patient has a history of OA, incontinence and history of rotator cuff tear. She has a remote history of tobacco use, quit smoking 61 years ago.   With recent episode of syncope, she was referred for ZIO cardiac monitor. Results have been reviewed today. Underlying rhythm was sinus with average HR 64 bpm and minimum HR 41 bpm nocturnal and maximum  bpm which corresponded with episode of SVT. There was no significant symptomatic bradycardia, pauses, AF, second degree mobitz II or third degree AV block. She had one single run on nonsustained VT lasting 5 beats with max rate of 167 bpm. She had 62 runs of PSVT lasting up to 11.0 sec with max HR of 203 bpm. Rare, <1% VE's and occasional SVE, 2.3%. There where episodes of ventricular bigeminy lasting up to 3.9 sec and ventricular trigeminy lasting up to 19.3 sec.  Today patient  "reports she has been feeling good, no recurrence of syncope. Admits that just prior to syncope occurring she got worked up as she could not get her key in the door, she does not remember falling. Her neighbor heard her fall and found her on the ground alert. She denies feeling lightheaded. No palpitations. No chest pain or pressure. No changes in breathing or increased dyspnea. She does report chronic lower extremity edema which is unchanged. She has been struggling with pain down her right leg. Reports pain starting at her \"tailbone down to my lower leg on left\". Pain continues to get worse with walking until she rests and experiences weakness in leg with pain.    1. Syncope, unspecified syncope type  2. Paroxysmal supraventricular tachycardia (H)  3. NSVT (nonsustained ventricular tachycardia) (H)  4. Essential hypertension  5. Abnormal stress test (2017)    PLAN:   1. Reviewed with EP results of cardiac monitor. HR in 40's nocturnally is not indication for pacing. She has no symptomatic bradycardia while awake and no pauses or high grade AV block, no bundle branch blocks. No evidence of chronotropic incompetence as she can achieve HR >100 bpm with exercise. Average HR 64 bpm.  2. With recent syncope, we should consider ischemia, CAD. She does have history of equivocal stress test in 2017 for which further evaluation with coronary CTA vs heart cath was recommended. Patient was not interested in further imaging at that time and would be recommended with recent episode of syncope occurring after exertional exercise. No anginal symptoms reported. Patient would like to hold off on further imaging as she has not experienced any chest pain or increased dyspnea, she will continue to monitor symptoms.  3. With recent syncope it is recommended echocardiogram. She has no audible murmur on exam. Carotids with no bruit and good upstroke, no recent carotid US which could also be considered with recent syncope. Patient is " agreeable to echocardiogram.   4. PSVT and single episode of NSVT for which she was not symptomatic. Medication could be considered to suppress episodes however, with nonsustained episodes for which she is not symptomatic this is not necessarily warranted. Patient would like to avoid starting any medications which are not necessary.  5. Labs reviewed with stable electrolytes and no anemia. Consider assessing thyroid function.  6. Continue to monitor for bradycardia and/or recurrence of symptoms. If recurrence of syncope, lightheadedness or concerns for bradycardia consider further monitoring with repeat ZIO, event monitor or ILR.    >50% of visit counseling on ZIO results and above diagnoses.    Thank you for allowing me to participate in the care of your patient. Please do not hesitate to contact me if you have any questions.     Samara Simmons, TUSHAR CNP [facility]

## 2020-06-01 ENCOUNTER — HOSPITAL ENCOUNTER (OUTPATIENT)
Dept: CARDIOLOGY | Facility: HOSPITAL | Age: 85
Discharge: HOME | End: 2020-06-01
Attending: NURSE PRACTITIONER | Admitting: NURSE PRACTITIONER
Payer: MEDICARE

## 2020-06-01 DIAGNOSIS — R55 SYNCOPE, UNSPECIFIED SYNCOPE TYPE: ICD-10-CM

## 2020-06-01 DIAGNOSIS — I10 ESSENTIAL HYPERTENSION: ICD-10-CM

## 2020-06-01 DIAGNOSIS — I47.10 PAROXYSMAL SUPRAVENTRICULAR TACHYCARDIA (H): ICD-10-CM

## 2020-06-01 DIAGNOSIS — I47.29 NSVT (NONSUSTAINED VENTRICULAR TACHYCARDIA) (H): ICD-10-CM

## 2020-06-01 PROCEDURE — 93306 TTE W/DOPPLER COMPLETE: CPT | Mod: TC

## 2020-06-01 PROCEDURE — 93306 TTE W/DOPPLER COMPLETE: CPT | Mod: 26 | Performed by: INTERNAL MEDICINE

## 2020-12-01 ENCOUNTER — OFFICE VISIT (OUTPATIENT)
Dept: CARDIOLOGY | Facility: OTHER | Age: 85
End: 2020-12-01
Attending: NURSE PRACTITIONER
Payer: COMMERCIAL

## 2020-12-01 VITALS
BODY MASS INDEX: 34.96 KG/M2 | SYSTOLIC BLOOD PRESSURE: 158 MMHG | DIASTOLIC BLOOD PRESSURE: 84 MMHG | OXYGEN SATURATION: 98 % | WEIGHT: 185 LBS | TEMPERATURE: 97.4 F | HEART RATE: 59 BPM

## 2020-12-01 DIAGNOSIS — I10 ESSENTIAL HYPERTENSION: ICD-10-CM

## 2020-12-01 DIAGNOSIS — R41.89 COGNITIVE CHANGES: ICD-10-CM

## 2020-12-01 DIAGNOSIS — Z87.898 HISTORY OF SYNCOPE: Primary | ICD-10-CM

## 2020-12-01 DIAGNOSIS — R09.89 RIGHT CAROTID BRUIT: ICD-10-CM

## 2020-12-01 DIAGNOSIS — R94.39 ABNORMAL STRESS TEST: ICD-10-CM

## 2020-12-01 DIAGNOSIS — I47.10 PAROXYSMAL SUPRAVENTRICULAR TACHYCARDIA (H): ICD-10-CM

## 2020-12-01 DIAGNOSIS — I47.29 NSVT (NONSUSTAINED VENTRICULAR TACHYCARDIA) (H): ICD-10-CM

## 2020-12-01 LAB
ALBUMIN SERPL-MCNC: 3.6 G/DL (ref 3.4–5)
ALP SERPL-CCNC: 118 U/L (ref 40–150)
ALT SERPL W P-5'-P-CCNC: 17 U/L (ref 0–50)
ANION GAP SERPL CALCULATED.3IONS-SCNC: 1 MMOL/L (ref 3–14)
AST SERPL W P-5'-P-CCNC: 16 U/L (ref 0–45)
BILIRUB SERPL-MCNC: 0.3 MG/DL (ref 0.2–1.3)
BUN SERPL-MCNC: 19 MG/DL (ref 7–30)
CALCIUM SERPL-MCNC: 9.5 MG/DL (ref 8.5–10.1)
CHLORIDE SERPL-SCNC: 104 MMOL/L (ref 94–109)
CO2 SERPL-SCNC: 30 MMOL/L (ref 20–32)
CREAT SERPL-MCNC: 0.79 MG/DL (ref 0.52–1.04)
ERYTHROCYTE [DISTWIDTH] IN BLOOD BY AUTOMATED COUNT: 12.6 % (ref 10–15)
GFR SERPL CREATININE-BSD FRML MDRD: 65 ML/MIN/{1.73_M2}
GLUCOSE SERPL-MCNC: 89 MG/DL (ref 70–99)
HCT VFR BLD AUTO: 39.6 % (ref 35–47)
HGB BLD-MCNC: 12.9 G/DL (ref 11.7–15.7)
MCH RBC QN AUTO: 30.4 PG (ref 26.5–33)
MCHC RBC AUTO-ENTMCNC: 32.6 G/DL (ref 31.5–36.5)
MCV RBC AUTO: 93 FL (ref 78–100)
PLATELET # BLD AUTO: 197 10E9/L (ref 150–450)
POTASSIUM SERPL-SCNC: 4.5 MMOL/L (ref 3.4–5.3)
PROT SERPL-MCNC: 7.9 G/DL (ref 6.8–8.8)
RBC # BLD AUTO: 4.24 10E12/L (ref 3.8–5.2)
SODIUM SERPL-SCNC: 135 MMOL/L (ref 133–144)
WBC # BLD AUTO: 7.5 10E9/L (ref 4–11)

## 2020-12-01 PROCEDURE — 36415 COLL VENOUS BLD VENIPUNCTURE: CPT | Mod: ZL | Performed by: NURSE PRACTITIONER

## 2020-12-01 PROCEDURE — 93010 ELECTROCARDIOGRAM REPORT: CPT | Performed by: INTERNAL MEDICINE

## 2020-12-01 PROCEDURE — 93005 ELECTROCARDIOGRAM TRACING: CPT

## 2020-12-01 PROCEDURE — G0463 HOSPITAL OUTPT CLINIC VISIT: HCPCS | Mod: 25

## 2020-12-01 PROCEDURE — 99214 OFFICE O/P EST MOD 30 MIN: CPT | Performed by: NURSE PRACTITIONER

## 2020-12-01 PROCEDURE — 80053 COMPREHEN METABOLIC PANEL: CPT | Mod: ZL | Performed by: NURSE PRACTITIONER

## 2020-12-01 PROCEDURE — G0463 HOSPITAL OUTPT CLINIC VISIT: HCPCS

## 2020-12-01 PROCEDURE — 85027 COMPLETE CBC AUTOMATED: CPT | Mod: ZL | Performed by: NURSE PRACTITIONER

## 2020-12-01 RX ORDER — LISINOPRIL 5 MG/1
5 TABLET ORAL EVERY MORNING
Qty: 90 TABLET | Refills: 1 | Status: SHIPPED | OUTPATIENT
Start: 2020-12-01 | End: 2021-07-02

## 2020-12-01 RX ORDER — INFLUENZA A VIRUS A/MICHIGAN/45/2015 X-275 (H1N1) ANTIGEN (FORMALDEHYDE INACTIVATED), INFLUENZA A VIRUS A/SINGAPORE/INFIMH-16-0019/2016 IVR-186 (H3N2) ANTIGEN (FORMALDEHYDE INACTIVATED), INFLUENZA B VIRUS B/PHUKET/3073/2013 ANTIGEN (FORMALDEHYDE INACTIVATED), AND INFLUENZA B VIRUS B/MARYLAND/15/2016 BX-69A ANTIGEN (FORMALDEHYDE INACTIVATED) 60; 60; 60; 60 UG/.7ML; UG/.7ML; UG/.7ML; UG/.7ML
INJECTION, SUSPENSION INTRAMUSCULAR
COMMUNITY
Start: 2020-10-19 | End: 2021-04-01

## 2020-12-01 ASSESSMENT — PAIN SCALES - GENERAL: PAINLEVEL: NO PAIN (0)

## 2020-12-01 NOTE — NURSING NOTE
"Chief Complaint   Patient presents with     RECHECK       Initial BP (!) 159/72 (BP Location: Right arm, Patient Position: Sitting, Cuff Size: Adult Large)   Pulse 59   Temp 97.4  F (36.3  C) (Tympanic)   Wt 83.9 kg (185 lb)   SpO2 98%   BMI 34.96 kg/m   Estimated body mass index is 34.96 kg/m  as calculated from the following:    Height as of 5/26/20: 1.549 m (5' 1\").    Weight as of this encounter: 83.9 kg (185 lb).  Medication Reconciliation: complete  Marina Lopez LPN    "

## 2020-12-01 NOTE — PATIENT INSTRUCTIONS
Thank you for allowing Samara Simmons and our team to participate in your care. Please call our office at [phone] with scheduling questions or if you need to cancel or change your appointment. With any other questions or concerns you may call Marina cardiology nurse at [phone].       If you experience chest pain, chest pressure, chest tightness, shortness of breath, fainting, lightheadedness, nausea, vomiting, or other concerning symptoms, please report to the Emergency Department or call 911. These symptoms may be emergent, and best treated in the Emergency Department.    Follow up in 6 months     Start Lisinopril (ZESTRIL) 5 MG tablet Take 1 tablet (5 mg) by mouth every morning.    You will have an Ultra Sound performed of you Carotids.  The hospital scheduling department will call to schedule you for this test.     Labs today and someone will call you with results.     EKG today, results were given in clinic today.

## 2020-12-01 NOTE — PROGRESS NOTES
[facility] HEART CARE   CARDIOLOGY CONSULT     Norma Banerjee   [address]  [place] [address]      Carol Foreman     Chief Complaint   Patient presents with     RECHECK        HPI:   Ms. Banerjee is a 89 year old female who presents for cardiology follow-up to visit on 5/26/2020 with history of syncope and recent ZIO cardiac monitor with presence of PSVT. Patient has a history of OA, incontinence and history of rotator cuff tear. She has a remote history of tobacco use, quit smoking 61 years ago.     Patiet underwent CT angio of the chest in 2017 without evidence of dissection. There was a small volume of calcified and noncalcified plaque seen throughout the thoracic aorta. She underwent NM Lexiscan perfusion scan in 2017 with small infarct involving kee-infarct ischemia of the anteroseptal wall close to the cardiac apex. She was seen by Dr. Rodriguez at this time with recommendation for coronary CTA versus cath to fully identify any possible flow limiting diease. She was not interested in any further testing at that time. It was also discussed medical management. If symptoms where to occur, consider starting BB, SL nitroglycerin and possibly Imdur.     Patient with recent syncope episode. She reports walking 10,000 steps daily. She was recently out walking and when she got back to her apartment described paraesthesias, she was getting out her keys to open the door when she passed out. She did not report any chest pain or dyspnea with exertion prior. She has experienced paraesthesias in her hands and feet while walking, describes as a numbness. She has also described symptoms of intermittent claudication. Reported aching in legs while walking that improved with rest.     With recent episode of syncope, she was referred for ZIO cardiac monitor. Underlying rhythm was sinus with average HR 64 bpm and minimum HR 41 bpm nocturnal and maximum  bpm which corresponded with episode of SVT. There was no  "significant symptomatic bradycardia, pauses, AF, second degree mobitz II or third degree AV block. She had one single run on nonsustained VT lasting 5 beats with max rate of 167 bpm. She had 62 runs of PSVT lasting up to 11.0 sec with max HR of 203 bpm. Rare, <1% VE's and occasional SVE, 2.3%. There where episodes of ventricular bigeminy lasting up to 3.9 sec and ventricular trigeminy lasting up to 19.3 sec.    Results from cardiac monitor reviewed by electrophysiology. HR in 40's nocturnally is not indication for pacing. She has no symptomatic bradycardia while awake and no pauses or high grade AV block, no bundle branch blocks. No evidence of chronotropic incompetence as she can achieve HR >100 bpm with activity and walking. Average HR 64 bpm.    At her last visit patient described feeling good without recurrence of syncope, no lightheadedness. No palpitations. No chest pain or pressure. No changes in breathing or increased dyspnea.  Positive for chronic lower extremity edema which is unchanged. She has been struggling with pain down her right leg. Reports pain starting at her \"tailbone down to my lower leg on left\". Pain continues to get worse with walking until she rests and experiences weakness in leg with pain.    With recent syncope, consider ischemia evaluation recommended. She does have history of equivocal stress test in 2017 for which further evaluation with coronary CTA vs heart cath was recommended. Patient was not interested in further imaging at that time. It was recommended further evaluation for ischemia with her recent episode of syncope occurring after exertional exercise. No anginal symptoms reported. Patient wished again to hold off on further imaging as she has not experienced any chest pain or increased dyspnea, she agreed to continue monitoring for symptoms.    Today patient denies any recurrence of syncope and no lightheadedness since her last visit. No changes in symptoms. No chest pain or " pressure. Chronic mild dyspnea without change. No palpitations. No increased edema. She does report becoming more forgetful, no behavioral disturbance. Continues to walk regularly and reads on a daily basis.     IMAGING:  [facility]  Echocardiography Laboratory  [address] [place] [id number]     Name: DEB BARTHOLOMEW  MRN: [id number]  : 1930  Study Date: 2020 10:56 AM  Age: 89 yrs  Gender: Female  Patient Location: [facility]  Reason For Study: Syncope, unspecified syncope type, Paroxysmal  supraventricular t  History: syncope  Ordering Physician: ROSCOE COMBS  Referring Physician: ROSCOE COMBS  Performed By: Melissa Keys     BSA: 1.8 m2  Height: 61 in  Weight: 177 lb  _____________________________________________________________________________  __        Procedure  The patient's rhythm is normal sinus.  _____________________________________________________________________________  __        Interpretation Summary  No pericardial effusion is present.  Global and regional left ventricular function is normal with an EF of 55-60%.  Grade I or early diastolic dysfunction.  The right ventricle is normal size.  Global right ventricular function is normal.  Both atria appear normal.  Mild mitral annular calcification is present.  Aortic valve is normal in structure and function.  The aortic valve is tricuspid.  Trace tricuspid insufficiency is present.  Right ventricular systolic pressure is 15mmHg above the right atrial pressure.  Trace to mild pulmonic insufficiency is present.  The aorta root is normal.  The patient's rhythm is normal sinus.  _____________________________________________________________________________  __        Left Ventricle  Global and regional left ventricular function is normal with an EF of 55-60%.  Grade I or early diastolic dysfunction.     Right Ventricle  The right ventricle is normal size. Global right ventricular function  is  normal.     Atria  Both atria appear normal.     Mitral Valve  Mild mitral annular calcification is present.     Aortic Valve  Aortic valve is normal in structure and function. The aortic valve is  tricuspid. The mean AoV pressure gradient is 3.1 mmHg.        Tricuspid Valve  Trace tricuspid insufficiency is present. Right ventricular systolic pressure  is 15mmHg above the right atrial pressure.     Pulmonic Valve  Trace to mild pulmonic insufficiency is present.     Vessels  The aorta root is normal.     Pericardium  No pericardial effusion is present.     _____________________________________________________________________________  __  MMode/2D Measurements & Calculations  Ao root diam: 3.0 cm  LA dimension: 3.8 cm  LA/Ao: 1.3  LVOT diam: 1.8 cm  LVOT area: 2.6 cm2     Time Measurements  Pulm. HR: 151.2 BPM        Doppler Measurements & Calculations  MV E max gurmeet: 68.4 cm/sec  MV A max gurmeet: 109.6 cm/sec  MV E/A: 0.62  MV dec slope: 156.6 cm/sec2  MV dec time: 0.44 sec  Ao V2 max: 109.6 cm/sec  Ao max P.8 mmHg  Ao V2 mean: 84.8 cm/sec  Ao mean PG: 3.1 mmHg  Ao V2 VTI: 27.5 cm  PRADEEP(I,D): 2.1 cm2  PRADEEP(V,D): 2.2 cm2  LV V1 max PG: 3.6 mmHg  LV V1 max: 95.3 cm/sec  LV V1 VTI: 21.9 cm  CO(LVOT): 10.4 l/min  CI(LVOT): 5.8 l/min/m2  SV(LVOT): 56.4 ml  SI(LVOT): 31.4 ml/m2  TV max P.8 mmHg  PA V2 max: 81.9 cm/sec  PA max P.7 mmHg  PA mean P.6 mmHg  PA V2 VTI: 24.0 cm  TR max gurmeet: 192.3 cm/sec  TR max P.8 mmHg  AV Gurmeet Ratio (DI): 0.87  PRADEEP Index (cm2/m2): 1.1        _____________________________________________________________________________  __        Report approved by: Daniela Jacob 2020 06:32 AM    PAST MEDICAL HISTORY:   Past Medical History:   Diagnosis Date     Carpal tunnel syndrome 2011     Dermoid cyst of ovary 2008    removed     Gout 2011     Osteoarthritis 10/01/2012     Recurrent UTI 2011     Urinary incontinence 2006     Weakness of both  legs 09/26/2011          FAMILY HISTORY:   Family History   Problem Relation Age of Onset     Alzheimer Disease Mother 92        cause of death     Cancer Mother         uterine     C.A.D. Father      Cerebrovascular Disease Father 72        cva, cause of death     Diabetes Sister      Hypertension Sister      Hypertension Sister      Hypertension Sister           PAST SURGICAL HISTORY:   Past Surgical History:   Procedure Laterality Date     ARTHROSCOPY KNEE      Osteoarthritis, left     C THALLIUM STRESS TEST  2007    negative persantine thallium GXT, Chest pain     CATARACT IOL, RT/LT      bilateral cataract surgery     CHOLECYSTECTOMY      Cholecystitis     COLONOSCOPY  4/2008    free of disease     HC ECP WITH CATARACT SURGERY       JOINT REPLACEMENT      Osteoarthritis, bilateral     RELEASE CARPAL TUNNEL  2011    RT     SURGICAL PATHOLOGY EXAM      ovarian mass, laparotomy          SOCIAL HISTORY:   Social History     Socioeconomic History     Marital status: Single     Spouse name: Not on file     Number of children: Not on file     Years of education: Not on file     Highest education level: Not on file   Occupational History     Not on file   Social Needs     Financial resource strain: Not on file     Food insecurity     Worry: Not on file     Inability: Not on file     Transportation needs     Medical: Not on file     Non-medical: Not on file   Tobacco Use     Smoking status: Former Smoker     Types: Cigarettes     Smokeless tobacco: Never Used     Tobacco comment: tried to quit yes, passive exposure no   Substance and Sexual Activity     Alcohol use: No     Drug use: No     Sexual activity: Not on file   Lifestyle     Physical activity     Days per week: Not on file     Minutes per session: Not on file     Stress: Not on file   Relationships     Social connections     Talks on phone: Not on file     Gets together: Not on file     Attends [employer] service: Not on file     Active member of club or  organization: Not on file     Attends meetings of clubs or organizations: Not on file     Relationship status: Not on file     Intimate partner violence     Fear of current or ex partner: Not on file     Emotionally abused: Not on file     Physically abused: Not on file     Forced sexual activity: Not on file   Other Topics Concern      Service Not Asked     Blood Transfusions Not Asked     Caffeine Concern Yes     Comment: coffee 2 cups      Occupational Exposure Not Asked     Hobby Hazards Not Asked     Sleep Concern Not Asked     Stress Concern Not Asked     Weight Concern Not Asked     Special Diet Not Asked     Back Care Not Asked     Exercise Not Asked     Bike Helmet Not Asked     Seat Belt Not Asked     Self-Exams Not Asked     Parent/sibling w/ CABG, MI or angioplasty before 65F 55M? No   Social History Narrative     Not on file          CURRENT MEDICATIONS:   Prior to Admission medications    Medication Sig Start Date End Date Taking? Authorizing Provider   Acetaminophen (TYLENOL PO) Take by mouth At Bedtime    Reported, Patient   aspirin 81 MG tablet Take 1 tablet (81 mg) by mouth daily 11/15/17   Darvin Cortez PA-C   indomethacin (INDOCIN) 25 MG capsule Take 1 capsule (25 mg) by mouth 3 times daily (with meals) 3/18/19   Carol Foreman MD          ALLERGIES:   Allergies   Allergen Reactions     Alendronate Sodium      Hot tolerance  Fosamax       Nitrofurantoin      Chill  Fever  Chest pain  Macrobid       Nitrofurantoin Macrocrystal      Chills   Fever  Chest pain  Macrobid       Propoxyphene Napsylate      Darvocet-N 100       Sulfamethoxazole Rash     Bactrim DS     Trimethoprim Rash     Bactrim DS            ROS:   CONSTITUTIONAL: No reported fever or chills. No changes in weight.  ENT: No visual disturbance, ear ache, epistaxis or sore throat.   CARDIOVASCULAR: No chest pain, chest pressure or chest discomfort. No palpitations. Positive for chornic lower extremity edema,  unchanged.  RESPIRATORY: No increased dyspnea, cough, wheezing or hemoptysis.  No reports of orthopnea or PND.  GI: No reported abdominal pain.   : No reported hematuria or dysuria.   NEUROLOGICAL: No lightheadedness or dizziness.  No recurrence of syncope.   HEMATOLOGIC: No history of anemia. No bleeding or excessive bruising. No history of blood clots.   ENDOCRINOLOGIC: No temperature intolerance. No hair or skin changes.  SKIN: No abnormal rashes or sores, no unusual itching.  PSYCHIATRIC: No history of depression or anxiety. No changes in mood, feeling down or anxious. No changes in sleep.    PHYSICAL EXAM:   BP (!) 158/84   Pulse 59   Temp 97.4  F (36.3  C) (Tympanic)   Wt 83.9 kg (185 lb)   SpO2 98%   BMI 34.96 kg/m    GENERAL: The patient is a well-developed, well-nourished, in no apparent distress.  HEENT: Head is normocephalic and atraumatic. Eyes are symmetrical with normal visual tracking. No icterus, no xanthelasmas. Nares appeared normal without nasal drainage. Mucous membranes are moist, no cyanosis.  NECK: Supple. JVP not visible. Possible slight carotid bruit on right.  CHEST/ LUNGS: Lungs clear to auscultation, no rales, rhonchi or wheezes, no use of accessory muscles, no retractions, respirations unlabored and normal respiratory rate.   CARDIO: Regular irregular rate and rhythm with S1 and S2, no S3 or S4 and no murmur, click or rub.   ABD: Abdomen is soft and nondistended.  EXTREMITIES:  Trace LE edema present.   MUSCULOSKELETAL: No visible joint swelling.   NEUROLOGIC: Alert and oriented X3. Normal speech, gait and affect. No focal neurologic deficits.   SKIN: No jaundice. No rashes or visible skin lesions present. No ecchymosis.     EKG:    Sinus bradycardia with sinus arrhythmia, rate 52 bpm.    LAB RESULTS:   No visits with results within 3 Month(s) from this visit.   Latest known visit with results is:   Office Visit on 01/20/2020   Component Date Value Ref Range Status     WBC  01/20/2020 6.9  4.0 - 11.0 10e9/L Final     RBC Count 01/20/2020 4.38  3.8 - 5.2 10e12/L Final     Hemoglobin 01/20/2020 13.4  11.7 - 15.7 g/dL Final     Hematocrit 01/20/2020 40.7  35.0 - 47.0 % Final     MCV 01/20/2020 93  78 - 100 fl Final     MCH 01/20/2020 30.6  26.5 - 33.0 pg Final     MCHC 01/20/2020 32.9  31.5 - 36.5 g/dL Final     RDW 01/20/2020 12.6  10.0 - 15.0 % Final     Platelet Count 01/20/2020 197  150 - 450 10e9/L Final     Sodium 01/20/2020 136  133 - 144 mmol/L Final     Potassium 01/20/2020 4.0  3.4 - 5.3 mmol/L Final     Chloride 01/20/2020 103  94 - 109 mmol/L Final     Carbon Dioxide 01/20/2020 28  20 - 32 mmol/L Final     Anion Gap 01/20/2020 5  3 - 14 mmol/L Final     Glucose 01/20/2020 91  70 - 99 mg/dL Final     Urea Nitrogen 01/20/2020 16  7 - 30 mg/dL Final     Creatinine 01/20/2020 0.80  0.52 - 1.04 mg/dL Final     GFR Estimate 01/20/2020 65  >60 mL/min/[1.73_m2] Final     GFR Estimate If Black 01/20/2020 75  >60 mL/min/[1.73_m2] Final     Calcium 01/20/2020 9.3  8.5 - 10.1 mg/dL Final     Bilirubin Total 01/20/2020 0.3  0.2 - 1.3 mg/dL Final     Albumin 01/20/2020 3.8  3.4 - 5.0 g/dL Final     Protein Total 01/20/2020 7.8  6.8 - 8.8 g/dL Final     Alkaline Phosphatase 01/20/2020 83  40 - 150 U/L Final     ALT 01/20/2020 21  0 - 50 U/L Final     AST 01/20/2020 14  0 - 45 U/L Final          ASSESSMENT:   Norma JIN Chriss presents for cardiology follow-up to visit on 5/26/2020 with history of syncope and recent ZIO cardiac monitor with presence of PSVT.  With recent episode of syncope, she was referred for ZIO cardiac monitor.Underlying rhythm was sinus with average HR 64 bpm and minimum HR 41 bpm nocturnal and maximum  bpm which corresponded with episode of SVT. There was no significant symptomatic bradycardia, pauses, AF, second degree mobitz II or third degree AV block. She had one single run on nonsustained VT lasting 5 beats with max rate of 167 bpm. She had 62 runs of PSVT  lasting up to 11.0 sec with max HR of 203 bpm. Rare, <1% VE's and occasional SVE, 2.3%. There where episodes of ventricular bigeminy lasting up to 3.9 sec and ventricular trigeminy lasting up to 19.3 sec.  With recent syncope, consider ischemia evaluation recommended. She does have history of equivocal stress test in 2017 for which further evaluation with coronary CTA vs heart cath was recommended. Patient was not interested in further imaging at that time. It was recommended further evaluation for ischemia with her recent episode of syncope occurring after exertional exercise. No anginal symptoms reported. Patient wished again to hold off on further imaging as she has not experienced any chest pain or increased dyspnea, she agreed to continue monitoring for symptoms.  Today patient denies any recurrence of syncope and no lightheadedness since her last visit. No changes in symptoms. No chest pain or pressure. Chronic mild dyspnea without change. No palpitations. No increased edema. She does report becoming more forgetful, no behavioral disturbance. Continues to walk regularly and reads on a daily basis.     1. History of syncope  2. Paroxysmal supraventricular tachycardia (H)  3. NSVT (nonsustained ventricular tachycardia) (H)  4. Essential hypertension  5. Abnormal stress test (2017)  6. Cognitive changes  7. Right carotid bruit    PLAN:   1. Patient with asymptomatic bradycardia.  HR in 40's nocturnally, not indication for pacing. She has no symptomatic bradycardia while awake and no pauses or high grade AV block, no bundle branch blocks. No evidence of chronotropic incompetence as she can achieve HR >100 bpm with activity. Average HR 64 bpm.  2. Declined  Ischemia evaluation.. She does have history of equivocal stress test in 2017 for which further evaluation with coronary CTA vs heart cath was recommended. Denies any anginal symptoms.   3. TTE reviewed which was stable with normal global and regional LV  function, LVEF 55-60%.positive for grade 1/early diastolic dysfunction.  The RV was normal in size and function.  Both atria appeared normal.  No significant valvular abnormalities.  No pulmonary hypertension.    4. She had not experienced any recurrent syncope are lightheadedness.  Describes feeling very well.    5. Recent monitor with PSVT and single episode of NSVT for which she was not symptomatic.Not interested in medication suppression and would be difficult to AV samantha block with baseline sinus bradycardia.   6. Recent labs reviewed with stable electrolytes and no anemia.   7. She does report some memory changes, no behavioral changes. She is reading daily. Likely age related mentation changes.   8. Possible CA bruit on right, with history of syncope. Recommended Carotid US.   9. Positive for uncontrolled HTN. Systolic pressures ranging 150-160's/80's. Recommended she start Lisinopril at low dose of 5 mg daily. Goal /80 or less recommended.      Thank you for allowing me to participate in the care of your patient. Please do not hesitate to contact me if you have any questions.     Samara Simmons, TUSHAR CNP CHFN

## 2020-12-03 ENCOUNTER — HOSPITAL ENCOUNTER (OUTPATIENT)
Dept: ULTRASOUND IMAGING | Facility: HOSPITAL | Age: 85
Discharge: HOME | End: 2020-12-03
Attending: NURSE PRACTITIONER | Admitting: NURSE PRACTITIONER
Payer: MEDICARE

## 2020-12-03 DIAGNOSIS — R41.89 COGNITIVE CHANGES: ICD-10-CM

## 2020-12-03 DIAGNOSIS — R09.89 RIGHT CAROTID BRUIT: ICD-10-CM

## 2020-12-03 DIAGNOSIS — Z87.898 HISTORY OF SYNCOPE: ICD-10-CM

## 2020-12-03 PROCEDURE — 93880 EXTRACRANIAL BILAT STUDY: CPT

## 2021-02-27 ENCOUNTER — HEALTH MAINTENANCE LETTER (OUTPATIENT)
Age: 86
End: 2021-02-27

## 2021-04-01 ENCOUNTER — ANCILLARY PROCEDURE (OUTPATIENT)
Dept: GENERAL RADIOLOGY | Facility: OTHER | Age: 86
End: 2021-04-01
Attending: NURSE PRACTITIONER
Payer: MEDICARE

## 2021-04-01 ENCOUNTER — OFFICE VISIT (OUTPATIENT)
Dept: FAMILY MEDICINE | Facility: OTHER | Age: 86
End: 2021-04-01
Attending: NURSE PRACTITIONER
Payer: MEDICARE

## 2021-04-01 VITALS
OXYGEN SATURATION: 99 % | SYSTOLIC BLOOD PRESSURE: 110 MMHG | TEMPERATURE: 97.9 F | DIASTOLIC BLOOD PRESSURE: 68 MMHG | HEART RATE: 75 BPM | WEIGHT: 185 LBS | BODY MASS INDEX: 34.96 KG/M2

## 2021-04-01 DIAGNOSIS — M79.641 PAIN OF RIGHT HAND: Primary | ICD-10-CM

## 2021-04-01 DIAGNOSIS — M79.641 PAIN OF RIGHT HAND: ICD-10-CM

## 2021-04-01 DIAGNOSIS — M19.041 PRIMARY OSTEOARTHRITIS OF RIGHT HAND: ICD-10-CM

## 2021-04-01 PROCEDURE — G0463 HOSPITAL OUTPT CLINIC VISIT: HCPCS

## 2021-04-01 PROCEDURE — 73130 X-RAY EXAM OF HAND: CPT | Mod: TC,RT

## 2021-04-01 PROCEDURE — 99213 OFFICE O/P EST LOW 20 MIN: CPT | Performed by: NURSE PRACTITIONER

## 2021-04-01 ASSESSMENT — ANXIETY QUESTIONNAIRES
6. BECOMING EASILY ANNOYED OR IRRITABLE: NOT AT ALL
2. NOT BEING ABLE TO STOP OR CONTROL WORRYING: NOT AT ALL
3. WORRYING TOO MUCH ABOUT DIFFERENT THINGS: NOT AT ALL
7. FEELING AFRAID AS IF SOMETHING AWFUL MIGHT HAPPEN: NOT AT ALL
GAD7 TOTAL SCORE: 0
1. FEELING NERVOUS, ANXIOUS, OR ON EDGE: NOT AT ALL
5. BEING SO RESTLESS THAT IT IS HARD TO SIT STILL: NOT AT ALL
4. TROUBLE RELAXING: NOT AT ALL

## 2021-04-01 ASSESSMENT — PAIN SCALES - GENERAL: PAINLEVEL: MODERATE PAIN (4)

## 2021-04-01 ASSESSMENT — PATIENT HEALTH QUESTIONNAIRE - PHQ9: SUM OF ALL RESPONSES TO PHQ QUESTIONS 1-9: 0

## 2021-04-01 NOTE — NURSING NOTE
"Chief Complaint   Patient presents with     Musculoskeletal Problem       Initial /68   Pulse 75   Temp 97.9  F (36.6  C) (Tympanic)   Wt 83.9 kg (185 lb)   SpO2 99%   BMI 34.96 kg/m   Estimated body mass index is 34.96 kg/m  as calculated from the following:    Height as of 5/26/20: 1.549 m (5' 1\").    Weight as of this encounter: 83.9 kg (185 lb).  Medication Reconciliation: complete  Michaela Mcgee LPN  "

## 2021-04-01 NOTE — PROGRESS NOTES
"    Assessment & Plan     Pain of right hand  Primary osteoarthritis of right hand  Patient was having right hand pain last night, but put on her thumb spica brace and the pain resolved. Currently has no hand pain. No fevers. No joint erythema to indicate a gout. XR negative for fracture. Arthritic changes seen. Able to make a fist without pain. She is not taking anything for pain. Will avoid additional medications right now as she denies any pain. Agrees to return with new or worsening symptoms.   [id number]     BMI:   Estimated body mass index is 34.96 kg/m  as calculated from the following:    Height as of 5/26/20: 1.549 m (5' 1\").    Weight as of this encounter: 83.9 kg (185 lb).         Lizy Amin NP  [facility] - REMBERTO Green is a 89 year old who presents for the following health issues    HPI     Musculoskeletal problem/pain  Onset/Duration: last night  Description  Location: hand - right  Joint Swelling: YES- arthritis   Redness: no  Pain: YES  Warmth: no  Intensity:  mild  Progression of Symptoms:  Improving; she had a sharp pain in her right hand that woke her up last night, she put a thumb spica brace on and the pain resolved  Accompanying signs and symptoms:   Fevers: no  Numbness/tingling/weakness: no  History  Trauma to the area: no  Recent illness:  no  Previous similar problem: no  Previous evaluation:  no  Precipitating or alleviating factors:  Aggravating factors include: gripping  Therapies tried and outcome: nothing  -Was unable to make a fist with her right hand last night d/t the pain, now able to make a fist without pain. Feels the brace helped.   -No injury.       Review of Systems   CONSTITUTIONAL: NEGATIVE for fever, chills, change in weight  INTEGUMENTARY/SKIN: NEGATIVE for worrisome rashes, moles or lesions  EYES: NEGATIVE for vision changes or irritation  ENT/MOUTH: NEGATIVE for ear, mouth and throat problems  RESP: NEGATIVE for significant cough or " SOB  CV: NEGATIVE for chest pain, palpitations or peripheral edema  GI: NEGATIVE for nausea, abdominal pain, heartburn, or change in bowel habits  MUSCULOSKELETAL: Was having right hand pain that has resolved   NEURO: NEGATIVE for weakness, dizziness or paresthesias  PSYCHIATRIC: NEGATIVE for changes in mood or affect      Objective    /68   Pulse 75   Temp 97.9  F (36.6  C) (Tympanic)   Wt 83.9 kg (185 lb)   SpO2 99%   BMI 34.96 kg/m    Body mass index is 34.96 kg/m .  Physical Exam   GENERAL: healthy, alert and no distress  NEURO: Normal strength and tone, mentation intact and speech normal  PSYCH: mentation appears normal, affect normal/bright  RIGHT HAND: Skin intact. No erythema or ecchymosis. No pain with palpation in hand or wrist. Radial pulse 2+ She does have arthritic changes with some swelling in the joints, no erythema-she notes that this is chronic. Normal sensation. Strength 5/5. Able to make a fist without pain.         PROCEDURE: XR HAND RT G/E 3 VW 4/1/2021 2:07 PM     HISTORY: r/o fracture, mid hand pain, no injury; Pain of right hand     COMPARISONS: None.     TECHNIQUE: 3 views.     FINDINGS: There is generalized osteopenia. No acute fracture or  dislocation is seen.     There is multifocal degenerative change. There is severe degenerative  change in the first carpal metacarpal joint and in the radiocarpal  joint with degenerative change between the lunate and capitate and STT  joint as well. There is mild to moderate degenerative change in  multiple interphalangeal joints.                                                                        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     YAMILE CRAWFORD MD

## 2021-04-02 ASSESSMENT — ANXIETY QUESTIONNAIRES: GAD7 TOTAL SCORE: 0

## 2021-04-13 NOTE — PATIENT INSTRUCTIONS
Preventive Health Recommendations    See your health care provider every year to    Review health changes.     Discuss preventive care.      Review your medicines if your doctor has prescribed any.      You no longer need a yearly Pap test unless you've had an abnormal Pap test in the past 10 years. If you have vaginal symptoms, such as bleeding or discharge, be sure to talk with your provider about a Pap test.      Every 1 to 2 years, have a mammogram.  If you are over 69, talk with your health care provider about whether or not you want to continue having screening mammograms.      Every 10 years, have a colonoscopy. Or, have a yearly FIT test (stool test). These exams will check for colon cancer.       Have a cholesterol test every 5 years, or more often if your doctor advises it.       Have a diabetes test (fasting glucose) every three years. If you are at risk for diabetes, you should have this test more often.       At age 65, have a bone density scan (DEXA) to check for osteoporosis (brittle bone disease).    Shots:    Get a flu shot each year.    Get a tetanus shot every 10 years.    Talk to your doctor about your pneumonia vaccines. There are now two you should receive - Pneumovax (PPSV 23) and Prevnar (PCV 13).    Talk to your pharmacist about the shingles vaccine.    Talk to your doctor about the hepatitis B vaccine.    Nutrition:     Eat at least 5 servings of fruits and vegetables each day.      Eat whole-grain bread, whole-wheat pasta and brown rice instead of white grains and rice.      Get adequate about Calcium and Vitamin D.     Lifestyle    Exercise at least 150 minutes a week (30 minutes a day, 5 days a week). This will help you control your weight and prevent disease.      Limit alcohol to one drink per day.      No smoking.       Wear sunscreen to prevent skin cancer.       See your dentist twice a year for an exam and cleaning.      See your eye doctor every 1 to 2 years to screen for  conditions such as glaucoma, macular degeneration, cataracts, etc.    Personalized Prevention Plan  You are due for the preventive services outlined below.  Your care team is available to assist you in scheduling these services.  If you have already completed any of these items, please share that information with your care team to update in your medical record.    Health Maintenance Due   Topic Date Due     COVID-19 Vaccine (1) Never done     Zoster (Shingles) Vaccine (1 of 2) Never done     Annual Wellness Visit  01/20/2021

## 2021-04-13 NOTE — PROGRESS NOTES
"SUBJECTIVE:   Norma Banerjee is a 89 year old female who presents for Preventive Visit.      Are you in the first 12 months of your Medicare Part B coverage?  No    Physical Health:    In general, how would you rate your overall physical health? good    Outside of work, how many days during the week do you exercise? none    Outside of work, approximately how many minutes a day do you exercise?not applicable    If you drink alcohol do you typically have >3 drinks per day or >7 drinks per week? No    Do you usually eat at least 4 servings of fruit and vegetables a day, include whole grains & fiber and avoid regularly eating high fat or \"junk\" foods? Yes    Do you have any problems taking medications regularly?  No    Do you have any side effects from medications? none    Needs assistance for the following daily activities: no assistance needed    Which of the following safety concerns are present in your home?  none identified     Hearing impairment: No    In the past 6 months, have you been bothered by leaking of urine? no    Mental Health:    In general, how would you rate your overall mental or emotional health? good  PHQ-2 Score:      Do you feel safe in your environment? Yes    Have you ever done Advance Care Planning? (For example, a Health Directive, POLST, or a discussion with a medical provider or your loved ones about your wishes): No, advance care planning information given to patient to review.  Patient declined advance care planning discussion at this time.    Additional concerns to address?  No    Fall risk:  Fallen 2 or more times in the past year?: No  Any fall with injury in the past year?: No    Cognitive Screenin) Repeat 3 items (Leader, Season, Table)    2) Clock draw: NORMAL  3) 3 item recall: Recalls 3 objects  Results: 3 items recalled: COGNITIVE IMPAIRMENT LESS LIKELY    Mini-CogTM Copyright S Puja. Licensed by the author for use in [facility]; reprinted with " "permission (mara@[facility]). All rights reserved.      Do you have sleep apnea, excessive snoring or daytime drowsiness?: no    Chest Pain      Onset: last week happened one night only     Description (location/character/radiation/duration): states was laying down in her bed and had some chest discomfort all of a sudden. Pain didn't radiate anywhere and went away on its own    Intensity:  0/10    Accompanying signs and symptoms:        Shortness of breath: no        Sweating: no        Nausea/vomitting: no        Palpitations: no        Other (fevers/chills/cough/heartburn/lightheadedness): no     History (similar episodes/previous evaluation): None    Precipitating or alleviating factors:       Worse with exertion: no        Worse with breathing: no        Related to eating: no        Better with burping: no     Therapies tried and outcome: None    This pain came on as she was in bed for the night she has a hard time describing it, just as a \"hurt\" lasting 5 minutes. She hasn't had this before or since. It was in the right chest without radiation. She doesn't have nitroglycerin to use. She had a cardiac workup in 2017     Patiet underwent CT angio of the chest in 2017 without evidence of dissection. There was a small volume of calcified and noncalcified plaque seen throughout the thoracic aorta. She underwent NM Lexiscan perfusion scan in 2017 with small infarct involving kee-infarct ischemia of the anteroseptal wall close to the cardiac apex. She was seen by Dr. Rodriguez at this time with recommendation for coronary CTA versus cath to fully identify any possible flow limiting diease. She was not interested in any further testing at that time.        She has known PSVT as well noted on Zio patch and again has not been interested in further evaluation for ischemia.         Hypertension Follow-up      Do you check your blood pressure regularly outside of the clinic? No     Are you following a low salt diet? Yes    Are " your blood pressures ever more than 140 on the top number (systolic) OR more   than 90 on the bottom number (diastolic), for example 140/90? No      Reviewed and updated as needed this visit by clinical staff  Tobacco  Allergies  Meds   Med Hx  Surg Hx  Fam Hx  Soc Hx        Reviewed and updated as needed this visit by Provider  Tobacco  Allergies  Meds   Med Hx  Surg Hx  Fam Hx  Soc Hx       Social History     Tobacco Use     Smoking status: Former Smoker     Types: Cigarettes     Smokeless tobacco: Never Used     Tobacco comment: tried to quit yes, passive exposure no   Substance Use Topics     Alcohol use: No                           Current providers sharing in care for this patient include:   Patient Care Team:  Carol Foreman MD as PCP - General (Family Practice)  Carol Foreman MD as Assigned PCP  Ryne Rodriguez DO as MD (Cardiology)  Samara Simmons APRN CNP as Assigned Heart and Vascular Provider    The following health maintenance items are reviewed in Epic and correct as of today:  Health Maintenance   Topic Date Due     COVID-19 Vaccine (1) Never done     ZOSTER IMMUNIZATION (2 of 2) 06/14/2021     FALL RISK ASSESSMENT  04/01/2022     MEDICARE ANNUAL WELLNESS VISIT  04/19/2022     DTAP/TDAP/TD IMMUNIZATION (3 - Td) 11/11/2023     ADVANCE CARE PLANNING  04/19/2026     PHQ-2  Completed     INFLUENZA VACCINE  Completed     Pneumococcal Vaccine: 65+ Years  Completed     Pneumococcal Vaccine: Pediatrics (0 to 5 Years) and At-Risk Patients (6 to 64 Years)  Aged Out     IPV IMMUNIZATION  Aged Out     MENINGITIS IMMUNIZATION  Aged Out     HEPATITIS B IMMUNIZATION  Aged Out     BP Readings from Last 3 Encounters:   04/19/21 108/64   04/01/21 110/68   12/01/20 (!) 158/84    Wt Readings from Last 3 Encounters:   04/19/21 84.4 kg (186 lb)   04/01/21 83.9 kg (185 lb)   12/01/20 83.9 kg (185 lb)                  Patient Active Problem List   Diagnosis     Urinary incontinence     Osteoarthritis      ACP (advance care planning)     Incomplete tear of right rotator cuff     Abnormal stress test     Atypical chest pain     Paroxysmal supraventricular tachycardia (H)     Syncope, unspecified syncope type     Morbid obesity (H)     Past Surgical History:   Procedure Laterality Date     ARTHROSCOPY KNEE      Osteoarthritis, left     C THALLIUM STRESS TEST  2007    negative persantine thallium GXT, Chest pain     CATARACT IOL, RT/LT      bilateral cataract surgery     CHOLECYSTECTOMY      Cholecystitis     COLONOSCOPY  4/2008    free of disease     HC ECP WITH CATARACT SURGERY       JOINT REPLACEMENT      Osteoarthritis, bilateral     RELEASE CARPAL TUNNEL  2011    RT     SURGICAL PATHOLOGY EXAM      ovarian mass, laparotomy       Social History     Tobacco Use     Smoking status: Former Smoker     Types: Cigarettes     Smokeless tobacco: Never Used     Tobacco comment: tried to quit yes, passive exposure no   Substance Use Topics     Alcohol use: No     Family History   Problem Relation Age of Onset     Alzheimer Disease Mother 92        cause of death     Cancer Mother         uterine     C.A.D. Father      Cerebrovascular Disease Father 72        cva, cause of death     Diabetes Sister      Hypertension Sister      Hypertension Sister      Hypertension Sister          Current Outpatient Medications   Medication Sig Dispense Refill     Acetaminophen (TYLENOL PO) Take by mouth At Bedtime       aspirin 81 MG tablet Take 1 tablet (81 mg) by mouth daily  OTC     lisinopril (ZESTRIL) 5 MG tablet Take 1 tablet (5 mg) by mouth every morning 90 tablet 1     nitroGLYcerin (NITROSTAT) 0.4 MG sublingual tablet For chest pain place 1 tablet under the tongue every 5 minutes for 3 doses. If symptoms persist 5 minutes after 3rd dose call 911. 25 tablet 3     Allergies   Allergen Reactions     Alendronate Sodium      Hot tolerance  Fosamax       Nitrofurantoin      Chill  Fever  Chest pain  Macrobid       Nitrofurantoin  "Macrocrystal      Chills   Fever  Chest pain  Macrobid       Propoxyphene Napsylate      Darvocet-N 100       Sulfamethoxazole Rash     Bactrim DS     Trimethoprim Rash     Bactrim DS       Mammogram Screening: Mammogram Screening - Mammography discussed and declined    ROS:  Constitutional, HEENT, cardiovascular, pulmonary, GI, , musculoskeletal, neuro, skin, endocrine and psych systems are negative, except as otherwise noted.    OBJECTIVE:   /64   Pulse 67   Temp 96.4  F (35.8  C) (Tympanic)   Resp 19   Ht 1.549 m (5' 1\")   Wt 84.4 kg (186 lb)   SpO2 98%   BMI 35.14 kg/m   Estimated body mass index is 35.14 kg/m  as calculated from the following:    Height as of this encounter: 1.549 m (5' 1\").    Weight as of this encounter: 84.4 kg (186 lb).  EXAM:   GENERAL APPEARANCE: healthy, alert and no distress  EYES: Eyes grossly normal to inspection, PERRL and conjunctivae and sclerae normal  HENT: ear canals and TM's normal, nose and mouth without ulcers or lesions, oropharynx clear and oral mucous membranes moist  NECK: no adenopathy, no asymmetry, masses, or scars and thyroid normal to palpation  RESP: lungs clear to auscultation - no rales, rhonchi or wheezes  BREAST: normal without masses, tenderness or nipple discharge and no palpable axillary masses or adenopathy  CV: regular rate and rhythm, normal S1 S2, no S3 or S4, no murmur, click or rub, no peripheral edema and peripheral pulses strong  ABDOMEN: soft, nontender, no hepatosplenomegaly, no masses and bowel sounds normal  MS: no musculoskeletal defects are noted and gait is age appropriate without ataxia  SKIN: no suspicious lesions or rashes  NEURO: Normal strength and tone, sensory exam grossly normal, mentation intact and speech normal  PSYCH: mentation appears normal and affect normal/bright        ASSESSMENT / PLAN:   1. Routine general medical examination at a health care facility  Doing well, continues to live independently     2. Primary " "osteoarthritis involving multiple joints  Check lab today  - CBC with platelets  - Comprehensive metabolic panel    3. Paroxysmal supraventricular tachycardia (H)  Stable     4. Chest pain, unspecified type  Discussed options for evaluation including stress testing and CT scanning or angiography. She declines further testing. She is \"ready to go home if she is called\"  Discussed using nitroglycerin to have on hand and she is agreeable with this.   - EKG 12-lead complete w/read - (Clinic Performed)  - nitroGLYcerin (NITROSTAT) 0.4 MG sublingual tablet; For chest pain place 1 tablet under the tongue every 5 minutes for 3 doses. If symptoms persist 5 minutes after 3rd dose call 911.  Dispense: 25 tablet; Refill: 3    5. Lipid screening  Will check lab today  - Lipid Profile    6. Body mass index (BMI) 35.0-35.9, adult            COUNSELING:  Reviewed preventive health counseling, as reflected in patient instructions       Immunizations  Discussed Covid vaccine. She is willing to proceed with this. Will work to help her get scheduled           Estimated body mass index is 35.14 kg/m  as calculated from the following:    Height as of this encounter: 1.549 m (5' 1\").    Weight as of this encounter: 84.4 kg (186 lb).        She reports that she has quit smoking. Her smoking use included cigarettes. She has never used smokeless tobacco.    Appropriate preventive services were discussed with this patient, including applicable screening as appropriate for cardiovascular disease, diabetes, osteopenia/osteoporosis, and glaucoma.  As appropriate for age/gender, discussed screening for colorectal cancer, prostate cancer, breast cancer, and cervical cancer. Checklist reviewing preventive services available has been given to the patient.    Reviewed patients plan of care and provided an AVS. The Basic Care Plan (routine screening as documented in Health Maintenance) for Norma meets the Care Plan requirement. This Care Plan has " been established and reviewed with the Patient.    Counseling Resources:  ATP IV Guidelines  Pooled Cohorts Equation Calculator  Breast Cancer Risk Calculator  BRCA-Related Cancer Risk Assessment: FHS-7 Tool  FRAX Risk Assessment  ICSI Preventive Guidelines  Dietary Guidelines for Americans, 2010  USDA's MyPlate  ASA Prophylaxis  Lung CA Screening    Carol Foreman MD  [facility]

## 2021-04-19 ENCOUNTER — OFFICE VISIT (OUTPATIENT)
Dept: FAMILY MEDICINE | Facility: OTHER | Age: 86
End: 2021-04-19
Attending: FAMILY MEDICINE
Payer: COMMERCIAL

## 2021-04-19 VITALS
SYSTOLIC BLOOD PRESSURE: 108 MMHG | HEART RATE: 67 BPM | HEIGHT: 61 IN | WEIGHT: 186 LBS | RESPIRATION RATE: 19 BRPM | DIASTOLIC BLOOD PRESSURE: 64 MMHG | OXYGEN SATURATION: 98 % | TEMPERATURE: 96.4 F | BODY MASS INDEX: 35.12 KG/M2

## 2021-04-19 DIAGNOSIS — Z00.00 ROUTINE GENERAL MEDICAL EXAMINATION AT A HEALTH CARE FACILITY: Primary | ICD-10-CM

## 2021-04-19 DIAGNOSIS — I47.10 PAROXYSMAL SUPRAVENTRICULAR TACHYCARDIA (H): ICD-10-CM

## 2021-04-19 DIAGNOSIS — Z13.220 LIPID SCREENING: ICD-10-CM

## 2021-04-19 DIAGNOSIS — R07.9 CHEST PAIN, UNSPECIFIED TYPE: ICD-10-CM

## 2021-04-19 DIAGNOSIS — M15.0 PRIMARY OSTEOARTHRITIS INVOLVING MULTIPLE JOINTS: ICD-10-CM

## 2021-04-19 PROBLEM — E66.01 MORBID OBESITY (H): Status: ACTIVE | Noted: 2021-04-19

## 2021-04-19 LAB
ALBUMIN SERPL-MCNC: 3.2 G/DL (ref 3.4–5)
ALP SERPL-CCNC: 90 U/L (ref 40–150)
ALT SERPL W P-5'-P-CCNC: 15 U/L (ref 0–50)
ANION GAP SERPL CALCULATED.3IONS-SCNC: 6 MMOL/L (ref 3–14)
AST SERPL W P-5'-P-CCNC: 12 U/L (ref 0–45)
BILIRUB SERPL-MCNC: 0.3 MG/DL (ref 0.2–1.3)
BUN SERPL-MCNC: 13 MG/DL (ref 7–30)
CALCIUM SERPL-MCNC: 9 MG/DL (ref 8.5–10.1)
CHLORIDE SERPL-SCNC: 99 MMOL/L (ref 94–109)
CHOLEST SERPL-MCNC: 134 MG/DL
CO2 SERPL-SCNC: 26 MMOL/L (ref 20–32)
CREAT SERPL-MCNC: 0.72 MG/DL (ref 0.52–1.04)
ERYTHROCYTE [DISTWIDTH] IN BLOOD BY AUTOMATED COUNT: 12.1 % (ref 10–15)
GFR SERPL CREATININE-BSD FRML MDRD: 73 ML/MIN/{1.73_M2}
GLUCOSE SERPL-MCNC: 94 MG/DL (ref 70–99)
HCT VFR BLD AUTO: 35.8 % (ref 35–47)
HDLC SERPL-MCNC: 42 MG/DL
HGB BLD-MCNC: 11.8 G/DL (ref 11.7–15.7)
LDLC SERPL CALC-MCNC: 75 MG/DL
MCH RBC QN AUTO: 30.3 PG (ref 26.5–33)
MCHC RBC AUTO-ENTMCNC: 33 G/DL (ref 31.5–36.5)
MCV RBC AUTO: 92 FL (ref 78–100)
NONHDLC SERPL-MCNC: 92 MG/DL
PLATELET # BLD AUTO: 238 10E9/L (ref 150–450)
POTASSIUM SERPL-SCNC: 4.2 MMOL/L (ref 3.4–5.3)
PROT SERPL-MCNC: 7.6 G/DL (ref 6.8–8.8)
RBC # BLD AUTO: 3.89 10E12/L (ref 3.8–5.2)
SODIUM SERPL-SCNC: 131 MMOL/L (ref 133–144)
TRIGL SERPL-MCNC: 84 MG/DL
WBC # BLD AUTO: 8.1 10E9/L (ref 4–11)

## 2021-04-19 PROCEDURE — 80061 LIPID PANEL: CPT | Mod: ZL | Performed by: FAMILY MEDICINE

## 2021-04-19 PROCEDURE — 85027 COMPLETE CBC AUTOMATED: CPT | Mod: ZL | Performed by: FAMILY MEDICINE

## 2021-04-19 PROCEDURE — 80053 COMPREHEN METABOLIC PANEL: CPT | Mod: ZL | Performed by: FAMILY MEDICINE

## 2021-04-19 PROCEDURE — 93005 ELECTROCARDIOGRAM TRACING: CPT

## 2021-04-19 PROCEDURE — 93010 ELECTROCARDIOGRAM REPORT: CPT | Performed by: INTERNAL MEDICINE

## 2021-04-19 PROCEDURE — 99397 PER PM REEVAL EST PAT 65+ YR: CPT | Performed by: FAMILY MEDICINE

## 2021-04-19 PROCEDURE — 36415 COLL VENOUS BLD VENIPUNCTURE: CPT | Mod: ZL | Performed by: FAMILY MEDICINE

## 2021-04-19 RX ORDER — NITROGLYCERIN 0.4 MG/1
TABLET SUBLINGUAL
Qty: 25 TABLET | Refills: 3 | Status: SHIPPED | OUTPATIENT
Start: 2021-04-19

## 2021-04-19 ASSESSMENT — PAIN SCALES - GENERAL: PAINLEVEL: NO PAIN (0)

## 2021-04-19 ASSESSMENT — MIFFLIN-ST. JEOR: SCORE: 1196.07

## 2021-06-10 ENCOUNTER — OFFICE VISIT (OUTPATIENT)
Dept: CARDIOLOGY | Facility: OTHER | Age: 86
End: 2021-06-10
Payer: COMMERCIAL

## 2021-06-10 VITALS
SYSTOLIC BLOOD PRESSURE: 164 MMHG | HEIGHT: 62 IN | OXYGEN SATURATION: 98 % | RESPIRATION RATE: 16 BRPM | HEART RATE: 57 BPM | WEIGHT: 188 LBS | TEMPERATURE: 97.6 F | BODY MASS INDEX: 34.6 KG/M2 | DIASTOLIC BLOOD PRESSURE: 77 MMHG

## 2021-06-10 DIAGNOSIS — R41.89 COGNITIVE CHANGES: ICD-10-CM

## 2021-06-10 DIAGNOSIS — I47.10 PAROXYSMAL SUPRAVENTRICULAR TACHYCARDIA (H): Primary | ICD-10-CM

## 2021-06-10 DIAGNOSIS — I10 ESSENTIAL HYPERTENSION: ICD-10-CM

## 2021-06-10 DIAGNOSIS — R94.39 ABNORMAL STRESS TEST: ICD-10-CM

## 2021-06-10 DIAGNOSIS — I47.29 NSVT (NONSUSTAINED VENTRICULAR TACHYCARDIA) (H): ICD-10-CM

## 2021-06-10 PROCEDURE — 99214 OFFICE O/P EST MOD 30 MIN: CPT | Performed by: NURSE PRACTITIONER

## 2021-06-10 PROCEDURE — G0463 HOSPITAL OUTPT CLINIC VISIT: HCPCS

## 2021-06-10 ASSESSMENT — PAIN SCALES - GENERAL: PAINLEVEL: NO PAIN (0)

## 2021-06-10 ASSESSMENT — MIFFLIN-ST. JEOR: SCORE: 1221.01

## 2021-06-10 NOTE — NURSING NOTE
"Chief Complaint   Patient presents with     RECHECK     6-month follow up       Initial BP (!) 150/74   Pulse 57   Temp 97.6  F (36.4  C) (Tympanic)   Resp 16   Ht 1.575 m (5' 2\")   SpO2 98%   BMI 34.02 kg/m   Estimated body mass index is 34.02 kg/m  as calculated from the following:    Height as of this encounter: 1.575 m (5' 2\").    Weight as of 4/19/21: 84.4 kg (186 lb).  Medication Reconciliation: complete  Radha Trimble LPN  "

## 2021-06-10 NOTE — PATIENT INSTRUCTIONS
Thank you for allowing Samara Simmons and our team to participate in your care. Please call our office at [phone] with scheduling questions or if you need to cancel or change your appointment. With any other questions or concerns you may call [facility] cardiology nurse at [phone].       If you experience chest pain, chest pressure, chest tightness, shortness of breath, fainting, lightheadedness, nausea, vomiting, or other concerning symptoms, please report to the Emergency Department or call 911. These symptoms may be emergent, and best treated in the Emergency Department.

## 2021-06-10 NOTE — PROGRESS NOTES
[facility]   CARDIOLOGY PROGRESS NOTE    Norma Banerjee   [address]  [place] [address]      Carol Foreman     Chief Complaint   Patient presents with     RECHECK     6-month follow up        HPI:   Ms. Banerjee is a 89 year old female who presents for cardiology follow-up to visit on 12/1/2020 with history of syncope and PSVT. Patient has a history of OA, incontinence and history of rotator cuff tear. She has a remote history of tobacco use, quit smoking 61 years ago.     Patiet underwent CT angio of the chest in 2017 without evidence of dissection. There was a small volume of calcified and noncalcified plaque seen throughout the thoracic aorta. She underwent NM Lexiscan perfusion scan in 2017 with small infarct involving kee-infarct ischemia of the anteroseptal wall close to the cardiac apex. She was seen by Dr. Rodriguez at this time with recommendation for coronary CTA versus cath to fully identify any possible flow limiting diease. She was not interested in any further testing at that time. It was also discussed medical management. If symptoms where to occur, consider starting BB, SL nitroglycerin and possibly Imdur.     With history of syncope she was referred for [facility] cardiac monitor. Underlying rhythm was sinus with average HR 64 bpm and minimum HR 41 bpm nocturnal and maximum  bpm which corresponded with episode of SVT. There was no significant symptomatic bradycardia, pauses, AF, second degree mobitz II or third degree AV block. She had one single run on nonsustained VT lasting 5 beats with max rate of 167 bpm. She had 62 runs of PSVT lasting up to 11.0 sec with max HR of 203 bpm. Rare, <1% VE's and occasional SVE, 2.3%. There where episodes of ventricular bigeminy lasting up to 3.9 sec and ventricular trigeminy lasting up to 19.3 sec.    Results from cardiac monitor previously reviewed by electrophysiology. HR in 40's nocturnally is not indication for pacing. She has no  "symptomatic bradycardia while awake and no pauses or high grade AV block, no bundle branch blocks. No evidence of chronotropic incompetence as she can achieve HR >100 bpm with activity and walking. Average HR 64 bpm.    Today patient reports feeling very good. No chest pain or pressure. No pain in the arm, jaw, neck or back. She  denies any recurrence of syncope and no lightheadedness since her last visit. No increased dyspnea. No palpitations. No increased edema. She does report becoming more forgetful, progressive memory loss, no behavioral disturbance. No fatigue. Just describes feeling \"lazy\", enjoys reading.    PAST MEDICAL HISTORY:   Past Medical History:   Diagnosis Date     Carpal tunnel syndrome 02/14/2011     Dermoid cyst of ovary 03/05/2008    removed     Gout 09/26/2011     Osteoarthritis 10/01/2012     Recurrent UTI 09/26/2011     Urinary incontinence 11/16/2006     Weakness of both legs 09/26/2011          FAMILY HISTORY:   Family History   Problem Relation Age of Onset     Alzheimer Disease Mother 92        cause of death     Cancer Mother         uterine     C.A.D. Father      Cerebrovascular Disease Father 72        cva, cause of death     Diabetes Sister      Hypertension Sister      Hypertension Sister      Hypertension Sister           PAST SURGICAL HISTORY:   Past Surgical History:   Procedure Laterality Date     ARTHROSCOPY KNEE      Osteoarthritis, left     C THALLIUM STRESS TEST  2007    negative persantine thallium GXT, Chest pain     CATARACT IOL, RT/LT      bilateral cataract surgery     CHOLECYSTECTOMY      Cholecystitis     COLONOSCOPY  4/2008    free of disease     HC ECP WITH CATARACT SURGERY       JOINT REPLACEMENT      Osteoarthritis, bilateral     RELEASE CARPAL TUNNEL  2011    RT     SURGICAL PATHOLOGY EXAM      ovarian mass, laparotomy          SOCIAL HISTORY:   Social History     Socioeconomic History     Marital status: Single     Spouse name: Not on file     Number of children: " Not on file     Years of education: Not on file     Highest education level: Not on file   Occupational History     Not on file   Social Needs     Financial resource strain: Not on file     Food insecurity     Worry: Not on file     Inability: Not on file     Transportation needs     Medical: Not on file     Non-medical: Not on file   Tobacco Use     Smoking status: Former Smoker     Types: Cigarettes     Smokeless tobacco: Never Used     Tobacco comment: tried to quit yes, passive exposure no   Substance and Sexual Activity     Alcohol use: No     Drug use: No     Sexual activity: Not on file   Lifestyle     Physical activity     Days per week: Not on file     Minutes per session: Not on file     Stress: Not on file   Relationships     Social connections     Talks on phone: Not on file     Gets together: Not on file     Attends Hoahaoism service: Not on file     Active member of club or organization: Not on file     Attends meetings of clubs or organizations: Not on file     Relationship status: Not on file     Intimate partner violence     Fear of current or ex partner: Not on file     Emotionally abused: Not on file     Physically abused: Not on file     Forced sexual activity: Not on file   Other Topics Concern      Service Not Asked     Blood Transfusions Not Asked     Caffeine Concern Yes     Comment: coffee 2 cups      Occupational Exposure Not Asked     Hobby Hazards Not Asked     Sleep Concern Not Asked     Stress Concern Not Asked     Weight Concern Not Asked     Special Diet Not Asked     Back Care Not Asked     Exercise Not Asked     Bike Helmet Not Asked     Seat Belt Not Asked     Self-Exams Not Asked     Parent/sibling w/ CABG, MI or angioplasty before 65F 55M? No   Social History Narrative     Not on file          CURRENT MEDICATIONS:   Prior to Admission medications    Medication Sig Start Date End Date Taking? Authorizing Provider   Acetaminophen (TYLENOL PO) Take by mouth At Bedtime     "Reported, Patient   aspirin 81 MG tablet Take 1 tablet (81 mg) by mouth daily 11/15/17   Darvin Cortez PA-C   indomethacin (INDOCIN) 25 MG capsule Take 1 capsule (25 mg) by mouth 3 times daily (with meals) 3/18/19   Carol Foreman MD          ALLERGIES:   Allergies   Allergen Reactions     Alendronate Sodium      Hot tolerance  Fosamax       Nitrofurantoin      Chill  Fever  Chest pain  Macrobid       Nitrofurantoin Macrocrystal      Chills   Fever  Chest pain  Macrobid       Propoxyphene Napsylate      Darvocet-N 100       Sulfamethoxazole Rash     Bactrim DS     Trimethoprim Rash     Bactrim DS            ROS:   CONSTITUTIONAL: No reported fever or chills. No changes in weight.  ENT: No visual disturbance, ear ache, epistaxis or sore throat.   CARDIOVASCULAR: No chest pain, chest pressure or chest discomfort. No palpitations. No increased edema.   RESPIRATORY: No increased dyspnea, cough, wheezing or hemoptysis.  No reports of orthopnea or PND.  GI: No reported abdominal pain.   : No reported hematuria or dysuria.   NEUROLOGICAL: No lightheadedness or dizziness.  No recurrence of syncope.   HEMATOLOGIC: No history of anemia. No bleeding or excessive bruising. No history of blood clots.   ENDOCRINOLOGIC: No temperature intolerance. No hair or skin changes.  SKIN: No abnormal rashes or sores, no unusual itching.  PSYCHIATRIC: No history of depression or anxiety. No changes in mood, feeling down or anxious. No changes in sleep.    PHYSICAL EXAM:   BP (!) 164/77 (BP Location: Left arm, Patient Position: Sitting, Cuff Size: Adult Regular)   Pulse 57   Temp 97.6  F (36.4  C) (Tympanic)   Resp 16   Ht 1.575 m (5' 2\")   Wt 85.3 kg (188 lb)   SpO2 98%   BMI 34.39 kg/m    GENERAL: The patient is a well-developed, well-nourished, in no apparent distress.  HEENT: Head is normocephalic and atraumatic. Eyes are symmetrical with normal visual tracking. No icterus, no xanthelasmas. Nares appeared normal without nasal " drainage. Mucous membranes are moist, no cyanosis.  NECK: Supple. JVP not visible.   CHEST/ LUNGS: Lungs clear to auscultation, no rales, rhonchi or wheezes, no use of accessory muscles, no retractions, respirations unlabored and normal respiratory rate.   CARDIO: Regular irregular rate and rhythm with S1 and S2, no S3 or S4 and no murmur, click or rub.   ABD: Abdomen is nondistended.  EXTREMITIES:  Trace LE edema present.   MUSCULOSKELETAL: No visible joint swelling.   NEUROLOGIC: Alert and oriented X3. Normal speech, gait and affect. No focal neurologic deficits.   SKIN: No jaundice. No rashes or visible skin lesions present. No ecchymosis.     LAB RESULTS:   No visits with results within 3 Month(s) from this visit.   Latest known visit with results is:   Office Visit on 01/20/2020   Component Date Value Ref Range Status     WBC 01/20/2020 6.9  4.0 - 11.0 10e9/L Final     RBC Count 01/20/2020 4.38  3.8 - 5.2 10e12/L Final     Hemoglobin 01/20/2020 13.4  11.7 - 15.7 g/dL Final     Hematocrit 01/20/2020 40.7  35.0 - 47.0 % Final     MCV 01/20/2020 93  78 - 100 fl Final     MCH 01/20/2020 30.6  26.5 - 33.0 pg Final     MCHC 01/20/2020 32.9  31.5 - 36.5 g/dL Final     RDW 01/20/2020 12.6  10.0 - 15.0 % Final     Platelet Count 01/20/2020 197  150 - 450 10e9/L Final     Sodium 01/20/2020 136  133 - 144 mmol/L Final     Potassium 01/20/2020 4.0  3.4 - 5.3 mmol/L Final     Chloride 01/20/2020 103  94 - 109 mmol/L Final     Carbon Dioxide 01/20/2020 28  20 - 32 mmol/L Final     Anion Gap 01/20/2020 5  3 - 14 mmol/L Final     Glucose 01/20/2020 91  70 - 99 mg/dL Final     Urea Nitrogen 01/20/2020 16  7 - 30 mg/dL Final     Creatinine 01/20/2020 0.80  0.52 - 1.04 mg/dL Final     GFR Estimate 01/20/2020 65  >60 mL/min/[1.73_m2] Final     GFR Estimate If Black 01/20/2020 75  >60 mL/min/[1.73_m2] Final     Calcium 01/20/2020 9.3  8.5 - 10.1 mg/dL Final     Bilirubin Total 01/20/2020 0.3  0.2 - 1.3 mg/dL Final     Albumin  "01/20/2020 3.8  3.4 - 5.0 g/dL Final     Protein Total 01/20/2020 7.8  6.8 - 8.8 g/dL Final     Alkaline Phosphatase 01/20/2020 83  40 - 150 U/L Final     ALT 01/20/2020 21  0 - 50 U/L Final     AST 01/20/2020 14  0 - 45 U/L Final          ASSESSMENT:   Norma Banerjee presents for cardiology follow-up to visit on 12/1/2020 with history of syncope and PSVT. Patient has a history of OA, incontinence and history of rotator cuff tear. She has a remote history of tobacco use, quit smoking 61 years ago.   Results from cardiac monitor previously reviewed by electrophysiology. HR in 40's nocturnally is not indication for pacing. She has no symptomatic bradycardia while awake and no pauses or high grade AV block, no bundle branch blocks. No evidence of chronotropic incompetence as she can achieve HR >100 bpm with activity and walking. Average HR 64 bpm.  Today patient reports feeling very good. No chest pain or pressure. No pain in the arm, jaw, neck or back. She  denies any recurrence of syncope and no lightheadedness since her last visit. No increased dyspnea. No palpitations. No increased edema. She does report becoming more forgetful, progressive memory loss, no behavioral disturbance. No fatigue. Just describes feeling \"lazy\", enjoys reading.    1. Paroxysmal supraventricular tachycardia (H)  2. NSVT (nonsustained ventricular tachycardia) (H)  3. Essential hypertension  4. Abnormal stress test (2017)  5. Cognitive changes    PLAN:   1. Patient with asymptomatic bradycardia.  HR in 40's nocturnally, not indication for pacing. She has no symptomatic bradycardia while awake and no pauses or high grade AV block, no bundle branch blocks. No evidence of chronotropic incompetence as she can achieve HR >100 bpm with activity. Average HR 64 bpm.  2. Previously declined ischemia evaluation. She does have history of equivocal stress test in 2017 for which further evaluation with coronary CTA vs heart cath was recommended. " Denies any anginal symptoms.   3. Previous TTE (6/2020) with normal global and regional LV function, LVEF 55-60%.positive for grade 1/early diastolic dysfunction.  The RV was normal in size and function.  Both atria appeared normal.  No significant valvular abnormalities.  No pulmonary hypertension.    4. She had not experienced any recurrent syncope are lightheadedness.  Described feeling very well. No carotid stenosis.  5. Positive for uncontrolled HTN. Systolic pressures ranging 150-160's/80's. Prior two readings normal with isolated elevation today. Some improvement with starting Lisinopril at 5 mg daily. Continue to monitor BP, if remains elevated >140/90, would recommend increasing her Lisinopril to 10 mg daily.     Thank you for allowing me to participate in the care of your patient. Please do not hesitate to contact me if you have any questions.     Samara Simmons, TUSHAR CNP [facility]

## 2021-07-02 DIAGNOSIS — I10 ESSENTIAL HYPERTENSION: ICD-10-CM

## 2021-07-02 RX ORDER — LISINOPRIL 5 MG/1
5 TABLET ORAL EVERY MORNING
Qty: 90 TABLET | Refills: 1 | Status: SHIPPED | OUTPATIENT
Start: 2021-07-02

## 2021-07-02 NOTE — TELEPHONE ENCOUNTER
Zestril 5mg      Last Written Prescription Date:  12/1/20  Last Fill Quantity: 90,   # refills: 1  Last Office Visit: 6/10/21  Future Office visit:       Routing refill request to provider for review/approval because:

## 2021-10-03 ENCOUNTER — HEALTH MAINTENANCE LETTER (OUTPATIENT)
Age: 86
End: 2021-10-03

## 2021-11-16 ENCOUNTER — TELEPHONE (OUTPATIENT)
Dept: FAMILY MEDICINE | Facility: OTHER | Age: 86
End: 2021-11-16
Payer: COMMERCIAL

## 2021-11-16 NOTE — TELEPHONE ENCOUNTER
Please schedule patient for date/time: can see this Thursday at 9:30, arrive at 9:15 for ankle pain    Have patient go to ER/Urgent Care Center. Urgent Care hours are 9:30 am to 8 pm, open 7 days a week. No.    Provider will call patient.No.    Other:

## 2021-11-16 NOTE — TELEPHONE ENCOUNTER
10:40 AM    Reason for Call: OVERBOOK    Patient is having the following symptoms: ANKLE PAIN     The patient is requesting an appointment for ASAP with Dr. Carol Foreman.    Was an appointment offered for this call? No  If yes : Appointment type              Date    Preferred method for responding to this message: Telephone Call  What is your phone number ?[phone]    If we cannot reach you directly, may we leave a detailed response at the number you provided? Yes    Can this message wait until your PCP/provider returns, if unavailable today? Not applicable    Ani Bueno

## 2021-11-18 ENCOUNTER — OFFICE VISIT (OUTPATIENT)
Dept: FAMILY MEDICINE | Facility: OTHER | Age: 86
End: 2021-11-18
Attending: FAMILY MEDICINE
Payer: MEDICARE

## 2021-11-18 VITALS
SYSTOLIC BLOOD PRESSURE: 134 MMHG | BODY MASS INDEX: 35.12 KG/M2 | HEIGHT: 61 IN | RESPIRATION RATE: 19 BRPM | TEMPERATURE: 97.6 F | DIASTOLIC BLOOD PRESSURE: 68 MMHG | OXYGEN SATURATION: 97 % | WEIGHT: 186 LBS | HEART RATE: 73 BPM

## 2021-11-18 DIAGNOSIS — D17.30 LIPOMA OF SKIN AND SUBCUTANEOUS TISSUE: ICD-10-CM

## 2021-11-18 DIAGNOSIS — M19.071 ARTHRITIS OF RIGHT ANKLE: Primary | ICD-10-CM

## 2021-11-18 DIAGNOSIS — R20.2 PARESTHESIA OF BOTH FEET: ICD-10-CM

## 2021-11-18 DIAGNOSIS — Z23 NEED FOR PROPHYLACTIC VACCINATION AND INOCULATION AGAINST INFLUENZA: ICD-10-CM

## 2021-11-18 PROCEDURE — G0463 HOSPITAL OUTPT CLINIC VISIT: HCPCS | Mod: 25

## 2021-11-18 PROCEDURE — G0008 ADMIN INFLUENZA VIRUS VAC: HCPCS

## 2021-11-18 PROCEDURE — G0008 ADMIN INFLUENZA VIRUS VAC: HCPCS | Performed by: FAMILY MEDICINE

## 2021-11-18 PROCEDURE — 99213 OFFICE O/P EST LOW 20 MIN: CPT | Performed by: FAMILY MEDICINE

## 2021-11-18 PROCEDURE — 90662 IIV NO PRSV INCREASED AG IM: CPT

## 2021-11-18 PROCEDURE — 90662 IIV NO PRSV INCREASED AG IM: CPT | Performed by: FAMILY MEDICINE

## 2021-11-18 ASSESSMENT — PAIN SCALES - GENERAL: PAINLEVEL: MILD PAIN (3)

## 2021-11-18 ASSESSMENT — MIFFLIN-ST. JEOR: SCORE: 1196.07

## 2021-11-18 NOTE — PROGRESS NOTES
"  Assessment & Plan     Arthritis of right ankle  Pain controlled    Lipoma of skin and subcutaneous tissue  She was concerned that this was edema and is reassured it is not  Will follow     Paresthesia of both feet  Discussed that this isn't vascular but may be neurologic  She isn't interested in EMG testing or other testing at this time         22 minutes spent on the date of the encounter doing chart review, patient visit and documentation        BMI:   Estimated body mass index is 35.14 kg/m  as calculated from the following:    Height as of this encounter: 1.549 m (5' 1\").    Weight as of this encounter: 84.4 kg (186 lb).           No follow-ups on file.    Carol Foreman MD  [facility] is a 89 year old who presents for the following health issues     HPI     Concern - right ankle pain  Onset: few months. More bothersome the last week or so. Thinks its arthritis   Description: right ankle pain  Intensity: mild, 3/10  Progression of Symptoms:  same  Accompanying Signs & Symptoms: none   Previous history of similar problem: yes   Precipitating factors:        Worsened by: walking on it  Alleviating factors:        Improved by: tylenol   Therapies tried and outcome: tylenol prn  She has had some swelling over the lateral ankle     She also notes that her feet get numb when she walks, resolves with sitting. She would walk her apartment halls twice every hour but has had to cut back with this. Her feet don't turn blue and are not cold.         Review of Systems   Constitutional, HEENT, cardiovascular, pulmonary, gi and gu systems are negative, except as otherwise noted.      Objective    /68   Pulse 73   Temp 97.6  F (36.4  C) (Tympanic)   Resp 19   Ht 1.549 m (5' 1\")   Wt 84.4 kg (186 lb)   SpO2 97%   BMI 35.14 kg/m    Body mass index is 35.14 kg/m .  Physical Exam   GENERAL APPEARANCE: healthy, alert and no distress  MS: extremities normal- no gross " deformities noted and soft, mobile, 3 cm growth under the right lateral malleolus, non tender, no edema noted. 4/4 DP pulses bilaterally, 1 sec capillary refill. Sensation intact   SKIN: no suspicious lesions or rashes  NEURO: Normal strength and tone, mentation intact and speech normal  PSYCH: mentation appears normal and affect normal/bright

## 2021-11-18 NOTE — NURSING NOTE
"Chief Complaint   Patient presents with     Musculoskeletal Problem     Flu Shot     declined       Initial /68   Pulse 73   Temp 97.6  F (36.4  C) (Tympanic)   Resp 19   Ht 1.549 m (5' 1\")   Wt 84.4 kg (186 lb)   SpO2 97%   BMI 35.14 kg/m   Estimated body mass index is 35.14 kg/m  as calculated from the following:    Height as of this encounter: 1.549 m (5' 1\").    Weight as of this encounter: 84.4 kg (186 lb).  Medication Reconciliation: complete  Erendira Sol LPN    "

## 2022-06-14 ENCOUNTER — OFFICE VISIT (OUTPATIENT)
Dept: CARDIOLOGY | Facility: OTHER | Age: 87
End: 2022-06-14
Attending: NURSE PRACTITIONER
Payer: COMMERCIAL

## 2022-06-14 ENCOUNTER — PATIENT OUTREACH (OUTPATIENT)
Dept: CARE COORDINATION | Facility: OTHER | Age: 87
End: 2022-06-14
Payer: COMMERCIAL

## 2022-06-14 VITALS
TEMPERATURE: 97.9 F | HEIGHT: 62 IN | SYSTOLIC BLOOD PRESSURE: 115 MMHG | BODY MASS INDEX: 35.59 KG/M2 | WEIGHT: 193.4 LBS | DIASTOLIC BLOOD PRESSURE: 71 MMHG | OXYGEN SATURATION: 94 % | HEART RATE: 65 BPM

## 2022-06-14 DIAGNOSIS — R94.39 ABNORMAL STRESS TEST: ICD-10-CM

## 2022-06-14 DIAGNOSIS — I47.10 PAROXYSMAL SUPRAVENTRICULAR TACHYCARDIA (H): Primary | ICD-10-CM

## 2022-06-14 DIAGNOSIS — I47.10 PAROXYSMAL SUPRAVENTRICULAR TACHYCARDIA (H): ICD-10-CM

## 2022-06-14 DIAGNOSIS — I10 ESSENTIAL HYPERTENSION: ICD-10-CM

## 2022-06-14 DIAGNOSIS — R00.1 SINUS BRADYCARDIA: ICD-10-CM

## 2022-06-14 LAB
ALBUMIN SERPL-MCNC: 3.4 G/DL (ref 3.4–5)
ALP SERPL-CCNC: 94 U/L (ref 40–150)
ALT SERPL W P-5'-P-CCNC: 16 U/L (ref 0–50)
ANION GAP SERPL CALCULATED.3IONS-SCNC: 4 MMOL/L (ref 3–14)
AST SERPL W P-5'-P-CCNC: 15 U/L (ref 0–45)
BILIRUB SERPL-MCNC: 0.3 MG/DL (ref 0.2–1.3)
BUN SERPL-MCNC: 17 MG/DL (ref 7–30)
CALCIUM SERPL-MCNC: 9 MG/DL (ref 8.5–10.1)
CHLORIDE BLD-SCNC: 102 MMOL/L (ref 94–109)
CO2 SERPL-SCNC: 27 MMOL/L (ref 20–32)
CREAT SERPL-MCNC: 0.93 MG/DL (ref 0.52–1.04)
ERYTHROCYTE [DISTWIDTH] IN BLOOD BY AUTOMATED COUNT: 12.6 % (ref 10–15)
GFR SERPL CREATININE-BSD FRML MDRD: 57 ML/MIN/1.73M2
GLUCOSE BLD-MCNC: 110 MG/DL (ref 70–99)
HCT VFR BLD AUTO: 39.3 % (ref 35–47)
HGB BLD-MCNC: 13.2 G/DL (ref 11.7–15.7)
MCH RBC QN AUTO: 30.6 PG (ref 26.5–33)
MCHC RBC AUTO-ENTMCNC: 33.6 G/DL (ref 31.5–36.5)
MCV RBC AUTO: 91 FL (ref 78–100)
PLATELET # BLD AUTO: 153 10E3/UL (ref 150–450)
POTASSIUM BLD-SCNC: 4.1 MMOL/L (ref 3.4–5.3)
PROT SERPL-MCNC: 7.7 G/DL (ref 6.8–8.8)
RBC # BLD AUTO: 4.31 10E6/UL (ref 3.8–5.2)
SODIUM SERPL-SCNC: 133 MMOL/L (ref 133–144)
WBC # BLD AUTO: 5.9 10E3/UL (ref 4–11)

## 2022-06-14 PROCEDURE — 80053 COMPREHEN METABOLIC PANEL: CPT | Mod: ZL | Performed by: NURSE PRACTITIONER

## 2022-06-14 PROCEDURE — 85027 COMPLETE CBC AUTOMATED: CPT | Mod: ZL | Performed by: NURSE PRACTITIONER

## 2022-06-14 PROCEDURE — 99214 OFFICE O/P EST MOD 30 MIN: CPT | Performed by: NURSE PRACTITIONER

## 2022-06-14 PROCEDURE — 36415 COLL VENOUS BLD VENIPUNCTURE: CPT | Mod: ZL | Performed by: NURSE PRACTITIONER

## 2022-06-14 PROCEDURE — G0463 HOSPITAL OUTPT CLINIC VISIT: HCPCS

## 2022-06-14 ASSESSMENT — PAIN SCALES - GENERAL: PAINLEVEL: MODERATE PAIN (5)

## 2022-06-14 NOTE — PATIENT INSTRUCTIONS
Thank you for allowing Samara Simmons and our team to participate in your care. Please call our office at [phone] with scheduling questions or if you need to cancel or change your appointment. With any other questions or concerns you may call [facility] cardiology nurse at [phone].       If you experience chest pain, chest pressure, chest tightness, shortness of breath, fainting, lightheadedness, nausea, vomiting, or other concerning symptoms, please report to the Emergency Department or call 911. These symptoms may be emergent, and best treated in the Emergency Department.        Labs today.     Follow up in 6 months

## 2022-06-14 NOTE — PROGRESS NOTES
[facility] HEART CARE   CARDIOLOGY PROGRESS NOTE    Norma Banerjee   [address]  [place] [address]      Carol Foreman     Chief Complaint   Patient presents with     Follow Up     Annual follow up         HPI:   Ms. Banerjee is a 89 year old female who presents for cardiology follow-up to visit on 6/10/2021 with history of syncope and PSVT. Patient has a history of OA, incontinence and history of rotator cuff tear. She has a remote history of tobacco use, quit smoking 61 years ago.     Patiet underwent CT angio of the chest in 2017 without evidence of dissection. There was a small volume of calcified and noncalcified plaque seen throughout the thoracic aorta. She underwent NM Lexiscan perfusion scan in 2017 with small infarct involving kee-infarct ischemia of the anteroseptal wall close to the cardiac apex. She was seen by Dr. Rodriguez at this time with recommendation for coronary CTA versus cath to fully identify any possible flow limiting diease. She was not interested in any further testing at that time. It was also discussed medical management. If symptoms where to occur, consider starting BB, SL nitroglycerin and possibly Imdur.     With history of syncope she was referred for [facility] cardiac monitor. Underlying rhythm was sinus with average HR 64 bpm and minimum HR 41 bpm nocturnal and maximum  bpm which corresponded with episode of SVT. There was no significant symptomatic bradycardia, pauses, AF, second degree mobitz II or third degree AV block. She had one single run on nonsustained VT lasting 5 beats with max rate of 167 bpm. She had 62 runs of PSVT lasting up to 11.0 sec with max HR of 203 bpm. Rare, <1% VE's and occasional SVE, 2.3%. There where episodes of ventricular bigeminy lasting up to 3.9 sec and ventricular trigeminy lasting up to 19.3 sec.    Results from cardiac monitor previously reviewed by electrophysiology. HR in 40's nocturnally is not indication for pacing. She has no  "symptomatic bradycardia while awake and no pauses or high grade AV block, no bundle branch blocks. No evidence of chronotropic incompetence as she can achieve HR >100 bpm with activity and walking. Average HR 64 bpm.    INTERVAL HISTORY:  Today patient reports feeling very good. No changes since last visit.  No chest pain or pressure. No pain in the arm, jaw, neck or back. Recent episode of lightheadedness not described as orthostatic without recurrence, no syncope. No increased dyspnea at rest or with exertion. No palpitations. No increased edema. She does report becoming more forgetful, progressive memory loss, no behavioral disturbance. Denies any changes in memory since last visit however, has not improved. No fatigue. Just describes feeling \"lazy\", enjoys reading.    PAST MEDICAL HISTORY:   Past Medical History:   Diagnosis Date     Carpal tunnel syndrome 02/14/2011     Dermoid cyst of ovary 03/05/2008    removed     Gout 09/26/2011     Osteoarthritis 10/01/2012     Recurrent UTI 09/26/2011     Urinary incontinence 11/16/2006     Weakness of both legs 09/26/2011          FAMILY HISTORY:   Family History   Problem Relation Age of Onset     Alzheimer Disease Mother 92        cause of death     Cancer Mother         uterine     C.A.D. Father      Cerebrovascular Disease Father 72        cva, cause of death     Diabetes Sister      Hypertension Sister      Hypertension Sister      Hypertension Sister           PAST SURGICAL HISTORY:   Past Surgical History:   Procedure Laterality Date     ARTHROSCOPY KNEE      Osteoarthritis, left     CATARACT IOL, RT/LT      bilateral cataract surgery     CHOLECYSTECTOMY      Cholecystitis     COLONOSCOPY  4/2008    free of disease     HC ECP WITH CATARACT SURGERY       JOINT REPLACEMENT      Osteoarthritis, bilateral     RELEASE CARPAL TUNNEL  2011    RT     SURGICAL PATHOLOGY EXAM      ovarian mass, laparotomy     ZZC THALLIUM STRESS TEST  2007    negative persantine thallium " GXT, Chest pain          SOCIAL HISTORY:   Social History     Socioeconomic History     Marital status: Single     Spouse name: Not on file     Number of children: Not on file     Years of education: Not on file     Highest education level: Not on file   Occupational History     Not on file   Social Needs     Financial resource strain: Not on file     Food insecurity     Worry: Not on file     Inability: Not on file     Transportation needs     Medical: Not on file     Non-medical: Not on file   Tobacco Use     Smoking status: Former Smoker     Types: Cigarettes     Smokeless tobacco: Never Used     Tobacco comment: tried to quit yes, passive exposure no   Substance and Sexual Activity     Alcohol use: No     Drug use: No     Sexual activity: Not on file   Lifestyle     Physical activity     Days per week: Not on file     Minutes per session: Not on file     Stress: Not on file   Relationships     Social connections     Talks on phone: Not on file     Gets together: Not on file     Attends Bahai service: Not on file     Active member of club or organization: Not on file     Attends meetings of clubs or organizations: Not on file     Relationship status: Not on file     Intimate partner violence     Fear of current or ex partner: Not on file     Emotionally abused: Not on file     Physically abused: Not on file     Forced sexual activity: Not on file   Other Topics Concern      Service Not Asked     Blood Transfusions Not Asked     Caffeine Concern Yes     Comment: coffee 2 cups      Occupational Exposure Not Asked     Hobby Hazards Not Asked     Sleep Concern Not Asked     Stress Concern Not Asked     Weight Concern Not Asked     Special Diet Not Asked     Back Care Not Asked     Exercise Not Asked     Bike Helmet Not Asked     Seat Belt Not Asked     Self-Exams Not Asked     Parent/sibling w/ CABG, MI or angioplasty before 65F 55M? No   Social History Narrative     Not on file          CURRENT  "MEDICATIONS:   Current Outpatient Medications   Medication     Acetaminophen (TYLENOL PO)     aspirin 81 MG tablet     lisinopril (ZESTRIL) 5 MG tablet     nitroGLYcerin (NITROSTAT) 0.4 MG sublingual tablet     No current facility-administered medications for this visit.         ALLERGIES:   Allergies   Allergen Reactions     Alendronate Sodium      Hot tolerance  Fosamax       Nitrofurantoin      Chill  Fever  Chest pain  Macrobid       Nitrofurantoin Macrocrystal      Chills   Fever  Chest pain  Macrobid       Propoxyphene Napsylate      Darvocet-N 100       Sulfamethoxazole Rash     Bactrim DS     Trimethoprim Rash     Bactrim DS            ROS:   CONSTITUTIONAL: No reported fever or chills. No changes in weight.  ENT: No visual disturbance, ear ache, epistaxis or sore throat.   CARDIOVASCULAR: No chest pain, chest pressure or chest discomfort. No palpitations. No increased edema.   RESPIRATORY: No increased dyspnea, cough, wheezing or hemoptysis.  No reports of orthopnea or PND.  GI: No reported abdominal pain.   : No reported hematuria or dysuria.   NEUROLOGICAL: Single episode of lightheadedness without recurrence, see HPI. No dizziness or syncope.   HEMATOLOGIC: No history of anemia. No bleeding or excessive bruising. No history of blood clots.   ENDOCRINOLOGIC: No temperature intolerance. No hair or skin changes.  SKIN: No abnormal rashes or sores, no unusual itching.  PSYCHIATRIC: No history of depression or anxiety. No changes in mood, feeling down or anxious. No changes in sleep.    PHYSICAL EXAM:   /71 (BP Location: Right arm)   Pulse 65   Temp 97.9  F (36.6  C) (Tympanic)   Ht 1.575 m (5' 2\")   Wt 87.7 kg (193 lb 6.4 oz)   SpO2 94%   BMI 35.37 kg/m    GENERAL: The patient is a well-developed, well-nourished, in no apparent distress.  HEENT: Head is normocephalic and atraumatic. Eyes are symmetrical with normal visual tracking. No icterus, no xanthelasmas. Nares appeared normal without " nasal drainage. Mucous membranes are moist, no cyanosis.  NECK: Supple. JVP not visible. No carotid bruits.   CHEST/ LUNGS: Lungs clear to auscultation, no rales, rhonchi or wheezes, no use of accessory muscles, no retractions, respirations unlabored and normal respiratory rate.   CARDIO: Regular rate and rhythm with S1 and S2, no S3 or S4 and no murmur, click or rub.   ABD: Abdomen is nondistended.  EXTREMITIES:  No LE edema present.   MUSCULOSKELETAL: No visible joint swelling.   NEUROLOGIC: Alert and oriented X3. Normal speech, gait and affect. No focal neurologic deficits.   SKIN: No jaundice. No rashes or visible skin lesions present. No ecchymosis.     LAB RESULTS:   Office Visit on 04/19/2021   Component Date Value Ref Range Status     WBC 04/19/2021 8.1  4.0 - 11.0 10e9/L Final     RBC Count 04/19/2021 3.89  3.8 - 5.2 10e12/L Final     Hemoglobin 04/19/2021 11.8  11.7 - 15.7 g/dL Final     Hematocrit 04/19/2021 35.8  35.0 - 47.0 % Final     MCV 04/19/2021 92  78 - 100 fl Final     MCH 04/19/2021 30.3  26.5 - 33.0 pg Final     MCHC 04/19/2021 33.0  31.5 - 36.5 g/dL Final     RDW 04/19/2021 12.1  10.0 - 15.0 % Final     Platelet Count 04/19/2021 238  150 - 450 10e9/L Final     Sodium 04/19/2021 131 (A) 133 - 144 mmol/L Final     Potassium 04/19/2021 4.2  3.4 - 5.3 mmol/L Final     Chloride 04/19/2021 99  94 - 109 mmol/L Final     Carbon Dioxide 04/19/2021 26  20 - 32 mmol/L Final     Anion Gap 04/19/2021 6  3 - 14 mmol/L Final     Glucose 04/19/2021 94  70 - 99 mg/dL Final    Non Fasting     Urea Nitrogen 04/19/2021 13  7 - 30 mg/dL Final     Creatinine 04/19/2021 0.72  0.52 - 1.04 mg/dL Final     GFR Estimate 04/19/2021 73  >60 mL/min/[1.73_m2] Final    Comment: Non  GFR Calc  Starting 12/18/2018, serum creatinine based estimated GFR (eGFR) will be   calculated using the Chronic Kidney Disease Epidemiology Collaboration   (CKD-EPI) equation.       GFR Estimate If Black 04/19/2021 84  >60  "mL/min/[1.73_m2] Final    Comment:  GFR Calc  Starting 12/18/2018, serum creatinine based estimated GFR (eGFR) will be   calculated using the Chronic Kidney Disease Epidemiology Collaboration   (CKD-EPI) equation.       Calcium 04/19/2021 9.0  8.5 - 10.1 mg/dL Final     Bilirubin Total 04/19/2021 0.3  0.2 - 1.3 mg/dL Final     Albumin 04/19/2021 3.2 (A) 3.4 - 5.0 g/dL Final     Protein Total 04/19/2021 7.6  6.8 - 8.8 g/dL Final     Alkaline Phosphatase 04/19/2021 90  40 - 150 U/L Final     ALT 04/19/2021 15  0 - 50 U/L Final     AST 04/19/2021 12  0 - 45 U/L Final     Cholesterol 04/19/2021 134  <200 mg/dL Final     Triglycerides 04/19/2021 84  <150 mg/dL Final    Non Fasting     HDL Cholesterol 04/19/2021 42 (A) >49 mg/dL Final     LDL Cholesterol Calculated 04/19/2021 75  <100 mg/dL Final    Desirable:       <100 mg/dl     Non HDL Cholesterol 04/19/2021 92  <130 mg/dL Final       ASSESSMENT:   Norma Banerjee presents for cardiology follow-up to visit on 6/10/2021 with history of syncope and PSVT. Patient has a history of OA, incontinence and history of rotator cuff tear. She has a remote history of tobacco use, quit smoking 61 years ago.   Today patient reports feeling very good. No changes since last visit.  No chest pain or pressure. No pain in the arm, jaw, neck or back. Recent episode of lightheadedness not described as orthostatic without recurrence, no syncope. No increased dyspnea at rest or with exertion. No palpitations. No increased edema. She does report becoming more forgetful, progressive memory loss, no behavioral disturbance. Denies any changes in memory since last visit however, has not improved. No fatigue. Just describes feeling \"lazy\", enjoys reading.    1. Paroxysmal supraventricular tachycardia (H)  2. Sinus bradycardia  3. Essential hypertension  4. Abnormal stress test (2017)    PLAN:   1. Patient with history of asymptomatic bradycardia.  HR in 40's nocturnally, not " indication for pacing. She has no symptomatic bradycardia while awake and no pauses or high grade AV block, no bundle branch blocks. No evidence of chronotropic incompetence as she can achieve HR >100 bpm with activity. Average HR remains in the 60's bpm. ECG today NSR, rate 63 bpm. Normal tracing.   2. Previously declined ischemia evaluation. She does have history of equivocal stress test in 2017 for which further evaluation with coronary CTA vs heart cath was recommended. She continues to deny experiencing any anginal symptoms.   3. Previous TTE (6/2020) with normal global and regional LV function, LVEF 55-60%.positive for grade 1/early diastolic dysfunction.  The RV was normal in size and function.  Both atria appeared normal.  No significant valvular abnormalities.  No pulmonary hypertension.    4. BP has remained well controlled on Lisinopril at 5 mg daily.   5. Labs today, see orders. She will follow-up with cardiology in 6 months, certainly sooner if needed.   Orders Placed This Encounter   Procedures     Comprehensive metabolic panel     CBC with platelets      Thank you for allowing me to participate in the care of your patient. Please do not hesitate to contact me if you have any questions.     Samara Simmons, APRN CNP [facility]

## 2022-06-14 NOTE — NURSING NOTE
"Chief Complaint   Patient presents with     Follow Up     Annual follow up        Initial /71 (BP Location: Right arm)   Pulse 65   Temp 97.9  F (36.6  C) (Tympanic)   Ht 1.575 m (5' 2\")   Wt 87.7 kg (193 lb 6.4 oz)   SpO2 94%   BMI 35.37 kg/m   Estimated body mass index is 35.37 kg/m  as calculated from the following:    Height as of this encounter: 1.575 m (5' 2\").    Weight as of this encounter: 87.7 kg (193 lb 6.4 oz).  Medication Reconciliation: complete  Marina Lopez LPN    "

## 2022-07-09 ENCOUNTER — HEALTH MAINTENANCE LETTER (OUTPATIENT)
Age: 87
End: 2022-07-09

## 2022-09-04 ENCOUNTER — HEALTH MAINTENANCE LETTER (OUTPATIENT)
Age: 87
End: 2022-09-04

## 2022-12-20 NOTE — NURSING NOTE
"Chief Complaint   Patient presents with     Physical       Initial /80 (BP Location: Left arm, Patient Position: Sitting, Cuff Size: Adult Regular)   Pulse 66   Temp 96.6  F (35.9  C) (Tympanic)   Ht 1.543 m (5' 0.75\")   Wt 79.4 kg (175 lb)   SpO2 99%   BMI 33.34 kg/m   Estimated body mass index is 33.34 kg/m  as calculated from the following:    Height as of this encounter: 1.543 m (5' 0.75\").    Weight as of this encounter: 79.4 kg (175 lb).  Medication Reconciliation: complete    Renetta Pepe LPN  "
Attending Only

## 2023-03-17 NOTE — PROGRESS NOTES
"  Assessment & Plan     Encounter to establish care  Patient is a 89 year old female in relatively good health presenting to establish care.  Medical, surgical, family and social history reviewed and updated.      Ganglion cyst  At base of right thumb there is a palpable firm nodularity.  Has been present there for a few weeks.  Asymptomatic.  Recommend continued monitoring unless becomes larger and symptomatic.      Paroxysmal supraventricular tachycardia (H)  Endorses occassional palpitations.  Asymptomatic    Primary osteoarthritis involving multiple joints  No acute issues       BMI:   Estimated body mass index is 34.64 kg/m  as calculated from the following:    Height as of 22: 1.575 m (5' 2\").    Weight as of this encounter: 85.9 kg (189 lb 6.4 oz).         Return in about 3 months (around 2023) for Routine preventive.    Christi Hay MD  [facility] - REMBERTO Green is a 89 year old accompanied by her daughter, presenting for the following health issues:  Establish Care    HPI   Establish care     -Patient has a lump on Right Thumb that she would like the provider to examine- only painful to the touch- has arthritis in wrist- happened a few weeks ago    Ankles are bothering her. Not able to walk as much as she used to.      Lives alone at [address] [place] [address] apartments.  Some memory problems.    -spots on arms (old age spots)    in 1999- Never expected to be on her own for this long.      Review of Systems   Constitutional, HEENT, cardiovascular, pulmonary, gi and gu systems are negative, except as otherwise noted.      Objective    /68   Pulse 87   Wt 85.9 kg (189 lb 6.4 oz)   SpO2 96%   BMI 34.64 kg/m    Body mass index is 34.64 kg/m .  Physical Exam   GENERAL: healthy, alert and no distress  EYES: Eyes grossly normal to inspection, PERRL and conjunctivae and sclerae normal  HENT: ear canals and TM's normal, nose and mouth without ulcers or " lesions  NECK: no adenopathy, no asymmetry, masses, or scars and thyroid normal to palpation  RESP: lungs clear to auscultation - no rales, rhonchi or wheezes  CV: regular rate and rhythm, normal S1 S2, no S3 or S4, no murmur, click or rub, no peripheral edema and peripheral pulses strong  ABDOMEN: soft, nontender, no hepatosplenomegaly, no masses and bowel sounds normal  MS: no gross musculoskeletal defects noted, no edema  SKIN: no suspicious lesions or rashes  NEURO: Normal strength and tone, mentation intact and speech normal  PSYCH: mentation appears normal, affect normal/bright

## 2023-03-20 ENCOUNTER — OFFICE VISIT (OUTPATIENT)
Dept: FAMILY MEDICINE | Facility: OTHER | Age: 88
End: 2023-03-20
Attending: STUDENT IN AN ORGANIZED HEALTH CARE EDUCATION/TRAINING PROGRAM
Payer: COMMERCIAL

## 2023-03-20 VITALS
WEIGHT: 189.4 LBS | HEART RATE: 87 BPM | OXYGEN SATURATION: 96 % | SYSTOLIC BLOOD PRESSURE: 122 MMHG | BODY MASS INDEX: 34.64 KG/M2 | DIASTOLIC BLOOD PRESSURE: 68 MMHG

## 2023-03-20 DIAGNOSIS — I47.10 PAROXYSMAL SUPRAVENTRICULAR TACHYCARDIA (H): ICD-10-CM

## 2023-03-20 DIAGNOSIS — M15.0 PRIMARY OSTEOARTHRITIS INVOLVING MULTIPLE JOINTS: ICD-10-CM

## 2023-03-20 DIAGNOSIS — Z76.89 ENCOUNTER TO ESTABLISH CARE: Primary | ICD-10-CM

## 2023-03-20 DIAGNOSIS — M67.40 GANGLION CYST: ICD-10-CM

## 2023-03-20 PROCEDURE — G0463 HOSPITAL OUTPT CLINIC VISIT: HCPCS

## 2023-03-20 PROCEDURE — 99213 OFFICE O/P EST LOW 20 MIN: CPT | Performed by: STUDENT IN AN ORGANIZED HEALTH CARE EDUCATION/TRAINING PROGRAM

## 2023-03-20 ASSESSMENT — ANXIETY QUESTIONNAIRES
7. FEELING AFRAID AS IF SOMETHING AWFUL MIGHT HAPPEN: NOT AT ALL
GAD7 TOTAL SCORE: 0
7. FEELING AFRAID AS IF SOMETHING AWFUL MIGHT HAPPEN: NOT AT ALL
8. IF YOU CHECKED OFF ANY PROBLEMS, HOW DIFFICULT HAVE THESE MADE IT FOR YOU TO DO YOUR WORK, TAKE CARE OF THINGS AT HOME, OR GET ALONG WITH OTHER PEOPLE?: NOT DIFFICULT AT ALL
3. WORRYING TOO MUCH ABOUT DIFFERENT THINGS: NOT AT ALL
1. FEELING NERVOUS, ANXIOUS, OR ON EDGE: NOT AT ALL
2. NOT BEING ABLE TO STOP OR CONTROL WORRYING: NOT AT ALL
4. TROUBLE RELAXING: NOT AT ALL
GAD7 TOTAL SCORE: 0
IF YOU CHECKED OFF ANY PROBLEMS ON THIS QUESTIONNAIRE, HOW DIFFICULT HAVE THESE PROBLEMS MADE IT FOR YOU TO DO YOUR WORK, TAKE CARE OF THINGS AT HOME, OR GET ALONG WITH OTHER PEOPLE: NOT DIFFICULT AT ALL
6. BECOMING EASILY ANNOYED OR IRRITABLE: NOT AT ALL
GAD7 TOTAL SCORE: 0
5. BEING SO RESTLESS THAT IT IS HARD TO SIT STILL: NOT AT ALL

## 2023-03-20 ASSESSMENT — PATIENT HEALTH QUESTIONNAIRE - PHQ9
SUM OF ALL RESPONSES TO PHQ QUESTIONS 1-9: 1
SUM OF ALL RESPONSES TO PHQ QUESTIONS 1-9: 1
10. IF YOU CHECKED OFF ANY PROBLEMS, HOW DIFFICULT HAVE THESE PROBLEMS MADE IT FOR YOU TO DO YOUR WORK, TAKE CARE OF THINGS AT HOME, OR GET ALONG WITH OTHER PEOPLE: NOT DIFFICULT AT ALL

## 2023-03-20 ASSESSMENT — PAIN SCALES - GENERAL: PAINLEVEL: MILD PAIN (2)

## 2023-07-23 ENCOUNTER — HEALTH MAINTENANCE LETTER (OUTPATIENT)
Age: 88
End: 2023-07-23

## 2024-08-08 ENCOUNTER — LAB REQUISITION (OUTPATIENT)
Dept: LAB | Facility: HOSPITAL | Age: 89
End: 2024-08-08
Payer: MEDICARE

## 2024-08-08 DIAGNOSIS — F02.80 DEMENTIA IN OTHER DISEASES CLASSIFIED ELSEWHERE, UNSPECIFIED SEVERITY, WITHOUT BEHAVIORAL DISTURBANCE, PSYCHOTIC DISTURBANCE, MOOD DISTURBANCE, AND ANXIETY (H): ICD-10-CM

## 2024-08-08 LAB
ALBUMIN SERPL BCG-MCNC: 3.9 G/DL (ref 3.5–5.2)
ALP SERPL-CCNC: 104 U/L (ref 40–150)
ALT SERPL W P-5'-P-CCNC: 12 U/L (ref 0–50)
ANION GAP SERPL CALCULATED.3IONS-SCNC: 10 MMOL/L (ref 7–15)
AST SERPL W P-5'-P-CCNC: 18 U/L (ref 0–45)
BILIRUB SERPL-MCNC: 0.2 MG/DL
BUN SERPL-MCNC: 14.3 MG/DL (ref 8–23)
CALCIUM SERPL-MCNC: 9.4 MG/DL (ref 8.8–10.4)
CHLORIDE SERPL-SCNC: 98 MMOL/L (ref 98–107)
CREAT SERPL-MCNC: 0.88 MG/DL (ref 0.51–0.95)
EGFRCR SERPLBLD CKD-EPI 2021: 61 ML/MIN/1.73M2
ERYTHROCYTE [DISTWIDTH] IN BLOOD BY AUTOMATED COUNT: 13.8 % (ref 10–15)
GLUCOSE SERPL-MCNC: 81 MG/DL (ref 70–99)
HCO3 SERPL-SCNC: 27 MMOL/L (ref 22–29)
HCT VFR BLD AUTO: 40.1 % (ref 35–47)
HGB BLD-MCNC: 13.1 G/DL (ref 11.7–15.7)
MCH RBC QN AUTO: 30.2 PG (ref 26.5–33)
MCHC RBC AUTO-ENTMCNC: 32.7 G/DL (ref 31.5–36.5)
MCV RBC AUTO: 92 FL (ref 78–100)
PLATELET # BLD AUTO: 229 10E3/UL (ref 150–450)
POTASSIUM SERPL-SCNC: 4.4 MMOL/L (ref 3.4–5.3)
PROT SERPL-MCNC: 6.5 G/DL (ref 6.4–8.3)
RBC # BLD AUTO: 4.34 10E6/UL (ref 3.8–5.2)
SODIUM SERPL-SCNC: 135 MMOL/L (ref 135–145)
WBC # BLD AUTO: 7.5 10E3/UL (ref 4–11)

## 2024-08-08 PROCEDURE — 85027 COMPLETE CBC AUTOMATED: CPT | Performed by: PHYSICIAN ASSISTANT

## 2024-08-08 PROCEDURE — 80053 COMPREHEN METABOLIC PANEL: CPT | Performed by: PHYSICIAN ASSISTANT

## 2024-09-13 ENCOUNTER — HOSPITAL ENCOUNTER (EMERGENCY)
Facility: HOSPITAL | Age: 89
Discharge: NURSING, REHAB OR LONG-TERM CARE | End: 2024-09-13
Attending: INTERNAL MEDICINE | Admitting: INTERNAL MEDICINE
Payer: MEDICARE

## 2024-09-13 VITALS
TEMPERATURE: 98.6 F | SYSTOLIC BLOOD PRESSURE: 180 MMHG | RESPIRATION RATE: 20 BRPM | DIASTOLIC BLOOD PRESSURE: 97 MMHG | OXYGEN SATURATION: 98 % | HEART RATE: 80 BPM

## 2024-09-13 DIAGNOSIS — N39.0 URINARY TRACT INFECTION WITHOUT HEMATURIA, SITE UNSPECIFIED: ICD-10-CM

## 2024-09-13 DIAGNOSIS — R44.1 VISUAL HALLUCINATIONS: ICD-10-CM

## 2024-09-13 LAB
ALBUMIN SERPL BCG-MCNC: 3.6 G/DL (ref 3.5–5.2)
ALBUMIN UR-MCNC: NEGATIVE MG/DL
ALP SERPL-CCNC: 112 U/L (ref 40–150)
ALT SERPL W P-5'-P-CCNC: 12 U/L (ref 0–50)
ANION GAP SERPL CALCULATED.3IONS-SCNC: 10 MMOL/L (ref 7–15)
APPEARANCE UR: CLEAR
AST SERPL W P-5'-P-CCNC: 18 U/L (ref 0–45)
BASOPHILS # BLD AUTO: 0 10E3/UL (ref 0–0.2)
BASOPHILS NFR BLD AUTO: 1 %
BILIRUB SERPL-MCNC: 0.2 MG/DL
BILIRUB UR QL STRIP: NEGATIVE
BUN SERPL-MCNC: 13 MG/DL (ref 8–23)
CALCIUM SERPL-MCNC: 9.2 MG/DL (ref 8.8–10.4)
CHLORIDE SERPL-SCNC: 98 MMOL/L (ref 98–107)
COLOR UR AUTO: ABNORMAL
CREAT SERPL-MCNC: 0.92 MG/DL (ref 0.51–0.95)
EGFRCR SERPLBLD CKD-EPI 2021: 57 ML/MIN/1.73M2
EOSINOPHIL # BLD AUTO: 0.3 10E3/UL (ref 0–0.7)
EOSINOPHIL NFR BLD AUTO: 4 %
ERYTHROCYTE [DISTWIDTH] IN BLOOD BY AUTOMATED COUNT: 13.3 % (ref 10–15)
GLUCOSE SERPL-MCNC: 115 MG/DL (ref 70–99)
GLUCOSE UR STRIP-MCNC: NEGATIVE MG/DL
HCO3 SERPL-SCNC: 23 MMOL/L (ref 22–29)
HCT VFR BLD AUTO: 38.5 % (ref 35–47)
HGB BLD-MCNC: 12.7 G/DL (ref 11.7–15.7)
HGB UR QL STRIP: ABNORMAL
HOLD SPECIMEN: NORMAL
IMM GRANULOCYTES # BLD: 0 10E3/UL
IMM GRANULOCYTES NFR BLD: 0 %
KETONES UR STRIP-MCNC: NEGATIVE MG/DL
LEUKOCYTE ESTERASE UR QL STRIP: ABNORMAL
LYMPHOCYTES # BLD AUTO: 1.8 10E3/UL (ref 0.8–5.3)
LYMPHOCYTES NFR BLD AUTO: 23 %
MCH RBC QN AUTO: 29.8 PG (ref 26.5–33)
MCHC RBC AUTO-ENTMCNC: 33 G/DL (ref 31.5–36.5)
MCV RBC AUTO: 90 FL (ref 78–100)
MONOCYTES # BLD AUTO: 0.9 10E3/UL (ref 0–1.3)
MONOCYTES NFR BLD AUTO: 11 %
NEUTROPHILS # BLD AUTO: 4.9 10E3/UL (ref 1.6–8.3)
NEUTROPHILS NFR BLD AUTO: 61 %
NITRATE UR QL: NEGATIVE
NRBC # BLD AUTO: 0 10E3/UL
NRBC BLD AUTO-RTO: 0 /100
PH UR STRIP: 7 [PH] (ref 4.7–8)
PLATELET # BLD AUTO: 208 10E3/UL (ref 150–450)
POTASSIUM SERPL-SCNC: 4.1 MMOL/L (ref 3.4–5.3)
PROT SERPL-MCNC: 7 G/DL (ref 6.4–8.3)
RBC # BLD AUTO: 4.26 10E6/UL (ref 3.8–5.2)
RBC URINE: 1 /HPF
SODIUM SERPL-SCNC: 131 MMOL/L (ref 135–145)
SP GR UR STRIP: 1.01 (ref 1–1.03)
SQUAMOUS EPITHELIAL: 1 /HPF
UROBILINOGEN UR STRIP-MCNC: NORMAL MG/DL
WBC # BLD AUTO: 8 10E3/UL (ref 4–11)
WBC URINE: 28 /HPF

## 2024-09-13 PROCEDURE — 36415 COLL VENOUS BLD VENIPUNCTURE: CPT | Performed by: INTERNAL MEDICINE

## 2024-09-13 PROCEDURE — 99283 EMERGENCY DEPT VISIT LOW MDM: CPT

## 2024-09-13 PROCEDURE — 99283 EMERGENCY DEPT VISIT LOW MDM: CPT | Performed by: INTERNAL MEDICINE

## 2024-09-13 PROCEDURE — 87088 URINE BACTERIA CULTURE: CPT | Performed by: INTERNAL MEDICINE

## 2024-09-13 PROCEDURE — 81001 URINALYSIS AUTO W/SCOPE: CPT | Performed by: INTERNAL MEDICINE

## 2024-09-13 PROCEDURE — 85004 AUTOMATED DIFF WBC COUNT: CPT | Performed by: INTERNAL MEDICINE

## 2024-09-13 PROCEDURE — 82435 ASSAY OF BLOOD CHLORIDE: CPT | Performed by: INTERNAL MEDICINE

## 2024-09-13 PROCEDURE — 250N000013 HC RX MED GY IP 250 OP 250 PS 637: Performed by: INTERNAL MEDICINE

## 2024-09-13 PROCEDURE — 87181 SC STD AGAR DILUTION PER AGT: CPT | Performed by: INTERNAL MEDICINE

## 2024-09-13 RX ORDER — DIAZEPAM 5 MG
10 TABLET ORAL ONCE
Status: DISCONTINUED | OUTPATIENT
Start: 2024-09-13 | End: 2024-09-13

## 2024-09-13 RX ORDER — CEPHALEXIN 500 MG/1
500 CAPSULE ORAL ONCE
Status: COMPLETED | OUTPATIENT
Start: 2024-09-13 | End: 2024-09-13

## 2024-09-13 RX ADMIN — CEPHALEXIN 500 MG: 500 CAPSULE ORAL at 22:49

## 2024-09-13 ASSESSMENT — ACTIVITIES OF DAILY LIVING (ADL): ADLS_ACUITY_SCORE: 35

## 2024-09-14 ENCOUNTER — HEALTH MAINTENANCE LETTER (OUTPATIENT)
Age: 89
End: 2024-09-14

## 2024-09-14 NOTE — ED TRIAGE NOTES
Patient presents via [facility] w/ c/o visual hallucinations.  [facility] staff told EMS this is unusual for her, Hx of dementia and UTIs.   Baseline orientation.   Afebrile at this time.   Denies pain at this time.

## 2024-09-16 ASSESSMENT — ENCOUNTER SYMPTOMS
BEHAVIOR CHANGES: 1
ALTERED MENTAL STATUS: 1

## 2024-09-16 NOTE — ED PROVIDER NOTES
History     Chief Complaint   Patient presents with    Hallucinations    Cystitis     The history is provided by the EMS personnel and the nursing home.   Altered Mental Status  Presenting symptoms: behavior changes    Severity:  Mild  Most recent episode:  Today  Episode history:  Multiple  Timing:  Intermittent  Chronicity:  New        Allergies:  Allergies   Allergen Reactions    Alendronate Sodium      Hot tolerance  Fosamax      Nitrofurantoin      Chill  Fever  Chest pain  Macrobid      Nitrofurantoin Macrocrystal      Chills   Fever  Chest pain  Macrobid      Propoxyphene Napsylate      Darvocet-N 100      Sulfamethoxazole Rash     Bactrim DS    Trimethoprim Rash     Bactrim DS         Problem List:    Patient Active Problem List    Diagnosis Date Noted    Morbid obesity (H) 04/19/2021     Priority: Medium    Paroxysmal supraventricular tachycardia (H24) 02/13/2020     Priority: Medium    Syncope, unspecified syncope type 02/13/2020     Priority: Medium    Abnormal stress test 12/13/2017     Priority: Medium    Atypical chest pain 12/13/2017     Priority: Medium    ACP (advance care planning) 10/31/2016     Priority: Medium     Advance Care Planning 11/14/2016: Receipt of ACP document:  Received: POLST which was signed and dated by provider on 11/3/16.  Document previously scanned on 11/8/16.  Order reviewed and found to be valid.  Code Status reflects choices in most recent ACP document.  Confirmed/documented designated decision maker(s).  Added by Melba Luu  Advance Care Planning 10/31/2016: ACP Review of Chart / Resources Provided:  Reviewed chart for advance care plan.  Norma JIN Chriss has no plan or code status on file. Discussed available resources and provided with information. Confirmed code status reflects current choices pending further ACP discussions.  Confirmed/documented legally designated decision makers.  Added by Mariana Richards              Incomplete tear of right rotator cuff  10/31/2016     Priority: Medium    Osteoarthritis 10/01/2012     Priority: Medium     Problem list name updated by automated process. Provider to review      Urinary incontinence 11/16/2006     Priority: Medium     Problem list name updated by automated process. Provider to review          Past Medical History:    Past Medical History:   Diagnosis Date    Carpal tunnel syndrome 02/14/2011    Dermoid cyst of ovary 03/05/2008    Gout 09/26/2011    Osteoarthritis 10/01/2012    Recurrent UTI 09/26/2011    Urinary incontinence 11/16/2006    Weakness of both legs 09/26/2011       Past Surgical History:    Past Surgical History:   Procedure Laterality Date    ARTHROSCOPY KNEE      Osteoarthritis, left    CATARACT IOL, RT/LT      bilateral cataract surgery    CHOLECYSTECTOMY      Cholecystitis    COLONOSCOPY  4/2008    free of disease    HC ECP WITH CATARACT SURGERY      JOINT REPLACEMENT      Osteoarthritis, bilateral    RELEASE CARPAL TUNNEL  2011    RT    SURGICAL PATHOLOGY EXAM      ovarian mass, laparotomy    ZZC THALLIUM STRESS TEST  2007    negative persantine thallium GXT, Chest pain       Family History:    Family History   Problem Relation Age of Onset    Alzheimer Disease Mother 92        cause of death    Cancer Mother         uterine    C.A.D. Father     Cerebrovascular Disease Father 72        cva, cause of death    Diabetes Sister     Hypertension Sister     Hypertension Sister     Hypertension Sister        Social History:  Marital Status:  Single [1]  Social History     Tobacco Use    Smoking status: Former     Types: Cigarettes    Smokeless tobacco: Never    Tobacco comments:     tried to quit yes, passive exposure no   Substance Use Topics    Alcohol use: No    Drug use: No        Medications:    Acetaminophen (TYLENOL PO)  aspirin 81 MG tablet  lisinopril (ZESTRIL) 5 MG tablet  nitroGLYcerin (NITROSTAT) 0.4 MG sublingual tablet          Review of Systems   Unable to perform ROS: Dementia       Physical  Exam   BP: (!) 119/107  Pulse: 76  Temp: 98.6  F (37  C)  Resp: 20  SpO2: 97 %      Physical Exam  Vitals and nursing note reviewed.   Constitutional:       Appearance: She is well-developed.   HENT:      Head: Normocephalic and atraumatic.      Mouth/Throat:      Pharynx: No oropharyngeal exudate.   Eyes:      Conjunctiva/sclera: Conjunctivae normal.      Pupils: Pupils are equal, round, and reactive to light.   Neck:      Thyroid: No thyromegaly.      Vascular: No JVD.      Trachea: No tracheal deviation.   Cardiovascular:      Rate and Rhythm: Normal rate and regular rhythm.      Heart sounds: Normal heart sounds. No murmur heard.     No friction rub. No gallop.   Pulmonary:      Effort: Pulmonary effort is normal. No respiratory distress.      Breath sounds: Normal breath sounds. No stridor. No wheezing or rales.   Chest:      Chest wall: No tenderness.   Abdominal:      General: Bowel sounds are normal. There is no distension.      Palpations: Abdomen is soft. There is no mass.      Tenderness: There is no abdominal tenderness. There is no guarding or rebound.   Musculoskeletal:         General: No tenderness. Normal range of motion.      Cervical back: Normal range of motion and neck supple.   Lymphadenopathy:      Cervical: No cervical adenopathy.   Skin:     General: Skin is warm and dry.      Coloration: Skin is not pale.      Findings: No erythema or rash.   Neurological:      Mental Status: She is alert. Mental status is at baseline.   Psychiatric:         Behavior: Behavior normal.         ED Course        Procedures                No results found for this or any previous visit (from the past 24 hour(s)).    Medications   cephALEXin (KEFLEX) capsule 500 mg (500 mg Oral $Given 9/13/24 [id number])       Assessments & Plan (with Medical Decision Making)   Sent for behavioral change , visual halluciation  Hx of dementia    Labs reviewed  UA Positive    Pt smiling and at her Baseline  Like UTI related  symptoms  Keflex started  Follow-up with PCP  I have reviewed the nursing notes.    I have reviewed the findings, diagnosis, plan and need for follow up with the patient.          Discharge Medication List as of 9/13/2024 10:48 PM          Final diagnoses:   Urinary tract infection without hematuria, site unspecified   Visual hallucinations       9/13/2024   [facility]       Juan Bailey MD  09/16/24 0644

## 2024-09-21 LAB — BACTERIA UR CULT: ABNORMAL

## 2024-10-16 ENCOUNTER — MEDICAL CORRESPONDENCE (OUTPATIENT)
Dept: HEALTH INFORMATION MANAGEMENT | Facility: HOSPITAL | Age: 89
End: 2024-10-16

## 2024-10-16 ENCOUNTER — HOSPITAL ENCOUNTER (EMERGENCY)
Facility: HOSPITAL | Age: 89
Discharge: NURSING, REHAB OR LONG-TERM CARE | End: 2024-10-16
Attending: NURSE PRACTITIONER | Admitting: NURSE PRACTITIONER
Payer: MEDICARE

## 2024-10-16 VITALS
DIASTOLIC BLOOD PRESSURE: 98 MMHG | HEART RATE: 80 BPM | SYSTOLIC BLOOD PRESSURE: 174 MMHG | OXYGEN SATURATION: 97 % | TEMPERATURE: 98.6 F | RESPIRATION RATE: 16 BRPM

## 2024-10-16 DIAGNOSIS — E87.1 HYPONATREMIA: ICD-10-CM

## 2024-10-16 DIAGNOSIS — N39.0 COMPLICATED UTI (URINARY TRACT INFECTION): ICD-10-CM

## 2024-10-16 LAB
ALBUMIN UR-MCNC: NEGATIVE MG/DL
ANION GAP SERPL CALCULATED.3IONS-SCNC: 13 MMOL/L (ref 7–15)
APPEARANCE UR: CLEAR
BILIRUB UR QL STRIP: NEGATIVE
BUN SERPL-MCNC: 14.9 MG/DL (ref 8–23)
CALCIUM SERPL-MCNC: 9.1 MG/DL (ref 8.8–10.4)
CHLORIDE SERPL-SCNC: 93 MMOL/L (ref 98–107)
COLOR UR AUTO: ABNORMAL
CREAT SERPL-MCNC: 1.03 MG/DL (ref 0.51–0.95)
EGFRCR SERPLBLD CKD-EPI 2021: 50 ML/MIN/1.73M2
ERYTHROCYTE [DISTWIDTH] IN BLOOD BY AUTOMATED COUNT: 13.2 % (ref 10–15)
GLUCOSE SERPL-MCNC: 100 MG/DL (ref 70–99)
GLUCOSE UR STRIP-MCNC: NEGATIVE MG/DL
HCO3 SERPL-SCNC: 20 MMOL/L (ref 22–29)
HCT VFR BLD AUTO: 34.5 % (ref 35–47)
HGB BLD-MCNC: 11.7 G/DL (ref 11.7–15.7)
HGB UR QL STRIP: NEGATIVE
HOLD SPECIMEN: NORMAL
HOLD SPECIMEN: NORMAL
HYALINE CASTS: 2 /LPF
KETONES UR STRIP-MCNC: NEGATIVE MG/DL
LEUKOCYTE ESTERASE UR QL STRIP: ABNORMAL
MCH RBC QN AUTO: 30.2 PG (ref 26.5–33)
MCHC RBC AUTO-ENTMCNC: 33.9 G/DL (ref 31.5–36.5)
MCV RBC AUTO: 89 FL (ref 78–100)
NITRATE UR QL: NEGATIVE
PH UR STRIP: 6.5 [PH] (ref 4.7–8)
PLATELET # BLD AUTO: 200 10E3/UL (ref 150–450)
POTASSIUM SERPL-SCNC: 4.3 MMOL/L (ref 3.4–5.3)
RBC # BLD AUTO: 3.88 10E6/UL (ref 3.8–5.2)
RBC URINE: 2 /HPF
SODIUM SERPL-SCNC: 126 MMOL/L (ref 135–145)
SODIUM SERPL-SCNC: 128 MMOL/L (ref 135–145)
SP GR UR STRIP: 1.01 (ref 1–1.03)
SQUAMOUS EPITHELIAL: 1 /HPF
UROBILINOGEN UR STRIP-MCNC: NORMAL MG/DL
WBC # BLD AUTO: 9.4 10E3/UL (ref 4–11)
WBC CLUMPS #/AREA URNS HPF: PRESENT /HPF
WBC URINE: 110 /HPF

## 2024-10-16 PROCEDURE — 81001 URINALYSIS AUTO W/SCOPE: CPT | Performed by: NURSE PRACTITIONER

## 2024-10-16 PROCEDURE — 87186 SC STD MICRODIL/AGAR DIL: CPT | Performed by: NURSE PRACTITIONER

## 2024-10-16 PROCEDURE — 250N000011 HC RX IP 250 OP 636: Performed by: NURSE PRACTITIONER

## 2024-10-16 PROCEDURE — 96372 THER/PROPH/DIAG INJ SC/IM: CPT | Performed by: NURSE PRACTITIONER

## 2024-10-16 PROCEDURE — 99284 EMERGENCY DEPT VISIT MOD MDM: CPT | Mod: 25

## 2024-10-16 PROCEDURE — 80048 BASIC METABOLIC PNL TOTAL CA: CPT | Performed by: NURSE PRACTITIONER

## 2024-10-16 PROCEDURE — 84295 ASSAY OF SERUM SODIUM: CPT | Performed by: NURSE PRACTITIONER

## 2024-10-16 PROCEDURE — 96360 HYDRATION IV INFUSION INIT: CPT

## 2024-10-16 PROCEDURE — 85027 COMPLETE CBC AUTOMATED: CPT | Performed by: NURSE PRACTITIONER

## 2024-10-16 PROCEDURE — 99284 EMERGENCY DEPT VISIT MOD MDM: CPT | Performed by: NURSE PRACTITIONER

## 2024-10-16 PROCEDURE — 36415 COLL VENOUS BLD VENIPUNCTURE: CPT | Performed by: NURSE PRACTITIONER

## 2024-10-16 PROCEDURE — 258N000003 HC RX IP 258 OP 636: Performed by: NURSE PRACTITIONER

## 2024-10-16 RX ORDER — CEFTRIAXONE SODIUM 1 G
1 VIAL (EA) INJECTION ONCE
Status: COMPLETED | OUTPATIENT
Start: 2024-10-16 | End: 2024-10-16

## 2024-10-16 RX ORDER — CALCIUM CARBONATE 500 MG/1
1 TABLET, CHEWABLE ORAL 4 TIMES DAILY PRN
COMMUNITY

## 2024-10-16 RX ORDER — RISPERIDONE 0.25 MG/1
0.25 TABLET ORAL 2 TIMES DAILY
COMMUNITY

## 2024-10-16 RX ORDER — ALUMINA, MAGNESIA, AND SIMETHICONE 2400; 2400; 240 MG/30ML; MG/30ML; MG/30ML
30 SUSPENSION ORAL EVERY 4 HOURS PRN
COMMUNITY

## 2024-10-16 RX ORDER — BISACODYL 10 MG
10 SUPPOSITORY, RECTAL RECTAL DAILY PRN
COMMUNITY

## 2024-10-16 RX ORDER — LOPERAMIDE HYDROCHLORIDE 2 MG/1
4 CAPSULE ORAL 4 TIMES DAILY PRN
COMMUNITY

## 2024-10-16 RX ORDER — BUSPIRONE HYDROCHLORIDE 5 MG/1
2.5 TABLET ORAL PRN
COMMUNITY

## 2024-10-16 RX ORDER — CEFDINIR 300 MG/1
300 CAPSULE ORAL 2 TIMES DAILY
Qty: 20 CAPSULE | Refills: 0 | Status: SHIPPED | OUTPATIENT
Start: 2024-10-16 | End: 2024-10-26

## 2024-10-16 RX ORDER — ESCITALOPRAM OXALATE 5 MG/1
2.5 TABLET ORAL DAILY
COMMUNITY

## 2024-10-16 RX ADMIN — SODIUM CHLORIDE 500 ML: 9 INJECTION, SOLUTION INTRAVENOUS at 21:56

## 2024-10-16 RX ADMIN — CEFTRIAXONE SODIUM 1 G: 1 INJECTION, POWDER, FOR SOLUTION INTRAMUSCULAR; INTRAVENOUS at 21:43

## 2024-10-16 ASSESSMENT — ACTIVITIES OF DAILY LIVING (ADL)
ADLS_ACUITY_SCORE: 35
ADLS_ACUITY_SCORE: 35

## 2024-10-16 ASSESSMENT — COLUMBIA-SUICIDE SEVERITY RATING SCALE - C-SSRS
2. HAVE YOU ACTUALLY HAD ANY THOUGHTS OF KILLING YOURSELF IN THE PAST MONTH?: NO
1. IN THE PAST MONTH, HAVE YOU WISHED YOU WERE DEAD OR WISHED YOU COULD GO TO SLEEP AND NOT WAKE UP?: NO
6. HAVE YOU EVER DONE ANYTHING, STARTED TO DO ANYTHING, OR PREPARED TO DO ANYTHING TO END YOUR LIFE?: NO

## 2024-10-17 NOTE — ED PROVIDER NOTES
"  History     Chief Complaint   Patient presents with    Agitation     \"out of control\" per [facility].  Banging her head yesterday, today uncooperative and wandering.  Hx dementia     HPI  Norma Banerjee is a 89 year old individual with history of dementia is sent over from nursing home due to agitation.  Patient apparently was wandering and being uncooperative.  Patient was given BuSpar at 1700 with no improvement.  Patient sent in for this reason.  Patient denies any issues upon arrival.    Allergies:  Allergies   Allergen Reactions    Alendronate Sodium      Hot tolerance  Fosamax      Nitrofurantoin      Chill  Fever  Chest pain  Macrobid      Nitrofurantoin Macrocrystal      Chills   Fever  Chest pain  Macrobid      Propoxyphene Napsylate      Darvocet-N 100      Sulfamethoxazole Rash     Bactrim DS    Trimethoprim Rash     Bactrim DS         Problem List:    Patient Active Problem List    Diagnosis Date Noted    Morbid obesity (H) 04/19/2021     Priority: Medium    Paroxysmal supraventricular tachycardia (H) 02/13/2020     Priority: Medium    Syncope, unspecified syncope type 02/13/2020     Priority: Medium    Abnormal stress test 12/13/2017     Priority: Medium    Atypical chest pain 12/13/2017     Priority: Medium    ACP (advance care planning) 10/31/2016     Priority: Medium     Advance Care Planning 11/14/2016: Receipt of ACP document:  Received: POLST which was signed and dated by provider on 11/3/16.  Document previously scanned on 11/8/16.  Order reviewed and found to be valid.  Code Status reflects choices in most recent ACP document.  Confirmed/documented designated decision maker(s).  Added by Melba Luu  Advance Care Planning 10/31/2016: ACP Review of Chart / Resources Provided:  Reviewed chart for advance care plan.  Norma Banerjee has no plan or code status on file. Discussed available resources and provided with information. Confirmed code status reflects current choices " pending further ACP discussions.  Confirmed/documented legally designated decision makers.  Added by Mariana Richards              Incomplete tear of right rotator cuff 10/31/2016     Priority: Medium    Osteoarthritis 10/01/2012     Priority: Medium     Problem list name updated by automated process. Provider to review      Urinary incontinence 11/16/2006     Priority: Medium     Problem list name updated by automated process. Provider to review          Past Medical History:    Past Medical History:   Diagnosis Date    Carpal tunnel syndrome 02/14/2011    Dermoid cyst of ovary 03/05/2008    Gout 09/26/2011    Osteoarthritis 10/01/2012    Recurrent UTI 09/26/2011    Urinary incontinence 11/16/2006    Weakness of both legs 09/26/2011       Past Surgical History:    Past Surgical History:   Procedure Laterality Date    ARTHROSCOPY KNEE      Osteoarthritis, left    CATARACT IOL, RT/LT      bilateral cataract surgery    CHOLECYSTECTOMY      Cholecystitis    COLONOSCOPY  4/2008    free of disease    HC ECP WITH CATARACT SURGERY      JOINT REPLACEMENT      Osteoarthritis, bilateral    RELEASE CARPAL TUNNEL  2011    RT    SURGICAL PATHOLOGY EXAM      ovarian mass, laparotomy    ZZC THALLIUM STRESS TEST  2007    negative persantine thallium GXT, Chest pain       Family History:    Family History   Problem Relation Age of Onset    Alzheimer Disease Mother 92        cause of death    Cancer Mother         uterine    C.A.D. Father     Cerebrovascular Disease Father 72        cva, cause of death    Diabetes Sister     Hypertension Sister     Hypertension Sister     Hypertension Sister        Social History:  Marital Status:  Single [1]  Social History     Tobacco Use    Smoking status: Former     Types: Cigarettes    Smokeless tobacco: Never    Tobacco comments:     tried to quit yes, passive exposure no   Substance Use Topics    Alcohol use: No    Drug use: No        Medications:    alum & mag hydroxide-simethicone (MAALOX   ES) 400-400-40 MG/5ML SUSP suspension  bisacodyl (DULCOLAX) 10 MG suppository  busPIRone (BUSPAR) 5 MG tablet  calcium carbonate (TUMS) 500 MG chewable tablet  cefdinir (OMNICEF) 300 MG capsule  dextromethorphan (TUSSIN COUGH) 15 MG/5ML syrup  escitalopram (LEXAPRO) 5 MG tablet  loperamide (IMODIUM) 2 MG capsule  magnesium hydroxide (MILK OF MAGNESIA) 400 MG/5ML suspension  risperiDONE (RISPERDAL) 0.25 MG tablet  Acetaminophen (TYLENOL PO)  aspirin 81 MG tablet  nitroGLYcerin (NITROSTAT) 0.4 MG sublingual tablet          Review of Systems   Unable to perform ROS: Dementia       Physical Exam   BP: 166/97  Pulse: 78  Temp: 98.6  F (37  C)  Resp: 16  SpO2: 94 %      GENERAL APPEARANCE:  The patient is a 89 year old well-developed, well-nourished individual in no acute distress that appears as stated age.  HEENT:  Normocephalic.  No soft spots, indentations, tenderness noted upon palpation of the scalp or face.  No crepitus or deformities noted to face or scalp.  Pupils are equal, round, and reactive to light.  Oropharynx is clear.  No avulsed teeth, buccal or tongue lacerations present.  Voice is clear and without muffling.   No otorrhea or rhinorrhea present.  Negative cervantes sign.  Negative for hemotympanum bilaterally.  NECK:  Supple.  Trachea is midline.    LUNGS:  Breathing is easy.  Breath sounds are equal and clear bilaterally.  No wheezes, rhonchi, or rales.  MUSCULOSKELETAL:  Normal gait and station.  No cervical/thoracic/lumbar spinal tenderness, step-offs, deformities noted to palpation.  No paraspinal tenderness noted to palpation.  Pelvis stable upon palpation.  No crepitus, deformities, tenderness noted to palpation to the upper or lower extremities to palpation.  Range of motion intact to all joints.  Strength equal bilaterally.  MENTAL STATUS:   The patient was alert.  Disoriented to place, time, purpose.  Registration and recall not intact.  No difficulty with concentration.  Spontaneous eye opening.   Follows commands.  GCS 14.   CRANIAL NERVES:  PERRL. EOMI; no nystagmus.  Full visual fields.  Trapezius and sternocleidomastoid are full strength. Tongue was midline and protrudes midline. Uvula was midline and raises midline. Facial sensation was intact to pain and light touch at all distributions. No speech disturbance. Hearing intact to conversation and whisper.  No facial asymmetry.  SENSORY:  Sensation intact.  SKIN:  Warm, dry, and well perfused.  Good turgor.  No lesions, nodules, or rashes are noted.  No bruising noted.          ED Course     ED Course as of 10/16/24 2259   Wed Oct 16, 2024   2103 In to see patient and history/physical completed.    2108 Labs ordered.   2135 UA with Microscopic reflex to Culture(!)  UA positive for large leukocyte Estrace and 110 WBCs.  Ceftriaxone 1 g IM ordered.   2142 Basic metabolic panel(!)  Sodium 126 with BUN of 14.9 and creatinine 1.03.  GFR 50.  Looking at old lab work this has worsened since previous.  500 cc 0.9% NS bolus ordered.   2259 Sodium(!): 128  Sodium improved to 128.  Patient will be discharged back to nursing home on cefdinir 300 mg twice daily x 10 days for UTI.  Follow-up with PCP for reevaluation of hyponatremia.  Return precautions given.          Results for orders placed or performed during the hospital encounter of 10/16/24 (from the past 24 hour(s))   CBC with platelets   Result Value Ref Range    WBC Count 9.4 4.0 - 11.0 10e3/uL    RBC Count 3.88 3.80 - 5.20 10e6/uL    Hemoglobin 11.7 11.7 - 15.7 g/dL    Hematocrit 34.5 (L) 35.0 - 47.0 %    MCV 89 78 - 100 fL    MCH 30.2 26.5 - 33.0 pg    MCHC 33.9 31.5 - 36.5 g/dL    RDW 13.2 10.0 - 15.0 %    Platelet Count 200 150 - 450 10e3/uL   Basic metabolic panel   Result Value Ref Range    Sodium 126 (L) 135 - 145 mmol/L    Potassium 4.3 3.4 - 5.3 mmol/L    Chloride 93 (L) 98 - 107 mmol/L    Carbon Dioxide (CO2) 20 (L) 22 - 29 mmol/L    Anion Gap 13 7 - 15 mmol/L    Urea Nitrogen 14.9 8.0 - 23.0 mg/dL     Creatinine 1.03 (H) 0.51 - 0.95 mg/dL    GFR Estimate 50 (L) >60 mL/min/1.73m2    Calcium 9.1 8.8 - 10.4 mg/dL    Glucose 100 (H) 70 - 99 mg/dL   Extra Tube    Narrative    The following orders were created for panel order Extra Tube.  Procedure                               Abnormality         Status                     ---------                               -----------         ------                     Extra Blue Top Tube[[id number]]                              Final result               Extra Red Top Tube[[id number]]                               Final result                 Please view results for these tests on the individual orders.   Extra Blue Top Tube   Result Value Ref Range    Hold Specimen JIC    Extra Red Top Tube   Result Value Ref Range    Hold Specimen JIC    UA with Microscopic reflex to Culture    Specimen: Urine, Catheter   Result Value Ref Range    Color Urine Light Yellow Colorless, Straw, Light Yellow, Yellow    Appearance Urine Clear Clear    Glucose Urine Negative Negative mg/dL    Bilirubin Urine Negative Negative    Ketones Urine Negative Negative mg/dL    Specific Gravity Urine 1.012 1.003 - 1.035    Blood Urine Negative Negative    pH Urine 6.5 4.7 - 8.0    Protein Albumin Urine Negative Negative mg/dL    Urobilinogen Urine Normal Normal, 2.0 mg/dL    Nitrite Urine Negative Negative    Leukocyte Esterase Urine Large (A) Negative    WBC Clumps Urine Present (A) None Seen /HPF    RBC Urine 2 <=2 /HPF    WBC Urine 110 (H) <=5 /HPF    Squamous Epithelials Urine 1 <=1 /HPF    Hyaline Casts Urine 2 <=2 /LPF    Narrative    Urine Culture ordered based on laboratory criteria   Sodium   Result Value Ref Range    Sodium 128 (L) 135 - 145 mmol/L       Medications   cefTRIAXone (ROCEPHIN) in lidocaine 1% (PF) for IM administration 1 g (1 g Intramuscular $Given 10/16/24 2143)   sodium chloride 0.9% BOLUS 500 mL (0 mLs Intravenous Stopped 10/16/24 2229)       Assessments & Plan (with Medical  Decision Making)     I have reviewed the nursing notes.    I have reviewed the findings, diagnosis, plan and need for follow up with the patient.    Summary:  Patient presents to the ER today for agitation.  Potential diagnosis which have been considered and evaluated include electrolyte abnormality, UTI, dementia progression, as well as others. Many of these have been excluded using the various modalities and assessment as noted on the chart. At the present time, the diagnosis given seems to be the most likely UTI and hyponatremia.  Upon arrival, vitals signs are normal.  The patient is alert but disoriented to time, place, situation.  Patient otherwise in no distress.  Physical examination otherwise benign.  Basic lab work obtained showing WBC of 9.4 with hemoglobin 11.7.  Electrolytes show sodium of 126 with potassium of 4.3.  BUN 14.9 with a creatinine of 1.03 and a GFR of 50.  Sodium has dropped from baseline normal of 133 average.  Slight worsening in creatinine and GFR.  UA obtained showing large leukocyte esterase with 110 WBC's and only 1 squamous epithelial cell.  Urine culture pending.  Ceftriaxone 1 g IM given for UTI.  When sodium came back low at 126 with slight worsening of renal functions did give 500 cc bolus of 0.9% NS.  Recheck of sodium after this was 128.  Will discharge patient back home on cefdinir 300 mg twice daily x 10 days for complicated UTI.  Close follow-up with PCP in regards to hyponatremia.  Return to ER for new or worsening symptoms.  Patient discharged back to nursing home.      Critical Care Time: None     Impression and plan discussed with patient. Questions answered, concerns addressed, indications for urgent re-evaluation reviewed, and  given. Patient/Parent/Caregiver agree with treatment plan and have no further questions at this time.  AVS provided at discharge.    This note was created by the Dragon Voice Dictation System. Inadvertent typographical errors, due to  software recognition problems, may still exist.             New Prescriptions    CEFDINIR (OMNICEF) 300 MG CAPSULE    Take 1 capsule (300 mg) by mouth 2 times daily for 10 days.       Final diagnoses:   Complicated UTI (urinary tract infection)   Hyponatremia       10/16/2024   [facility]       Cleveland Hollingsworth APRN CNP  10/16/24 2309

## 2024-10-17 NOTE — DISCHARGE INSTRUCTIONS
Antibiotic use:   An antibiotic was ordered to help fight the bacterial infection that was acquired.  You need to take this medication exactly as prescribed.  DO NOT stop taking this medication until the prescribed end date, even if you are feeling better.  This way the affecting bacteria in your body are killed off.   You should eat probiotic yogurt (with live cultures) or Kefir (similar to yogurt) while taking antibiotic to promote regrowth of good bacteria in your digestive tract, which can be depleted by antibiotics.  Birth control and antibiotics (if applicable):   Birth control pills contain estrogens. Some antibiotics cause the enzymes in the liver to increase the break-down of estrogens and thereby can decrease the levels of estrogens in the body and the effectiveness of the birth control medications.  This can result in unwanted pregnancy.  To be safe it is wise to use a back-up-method of birth control while you take, and for 7 days after finishing the antibiotic.  Coumadin and antibiotics (if applicable):   Finally, if on coumadin, let your coumadin prescriber know. You likely need to have your INR checked by your Primary Care Provider in 2-3 days. Contact your clinic for an appointment.

## 2024-10-17 NOTE — ED TRIAGE NOTES
Pt arrived via EMS cart from [facility].  Phone report was received.  Hx dementia.  Banging her head on the wall yesterday, today wandering, uncooperative, staff unable to care for pt.  Received Buspar 2.5mg po at 1700 without improvement.  Arrives cooperative, awake, alert, no obvious injuries noted, no acute distress noted.      Triage Assessment (Adult)       Row Name 10/16/24 3021          Triage Assessment    Airway WDL WDL        Respiratory WDL    Respiratory WDL WDL        Skin Circulation/Temperature WDL    Skin Circulation/Temperature WDL WDL        Cardiac WDL    Cardiac WDL WDL        Peripheral/Neurovascular WDL    Peripheral Neurovascular WDL WDL        Cognitive/Neuro/Behavioral WDL    Cognitive/Neuro/Behavioral WDL level of consciousness;mood/behavior     Level of Consciousness alert     Mood/Behavior cooperative

## 2024-10-17 NOTE — ED NOTES
Assisted up to BS commode to void.  Assisted back to cart when finished without incident.  Awaiting transportation.

## 2024-10-21 LAB — BACTERIA UR CULT: ABNORMAL
